# Patient Record
Sex: MALE | Race: WHITE | Employment: FULL TIME | URBAN - METROPOLITAN AREA
[De-identification: names, ages, dates, MRNs, and addresses within clinical notes are randomized per-mention and may not be internally consistent; named-entity substitution may affect disease eponyms.]

---

## 2018-03-20 ENCOUNTER — HOSPITAL ENCOUNTER (OUTPATIENT)
Age: 43
Discharge: HOME | End: 2018-03-22
Payer: COMMERCIAL

## 2018-03-20 ENCOUNTER — OFFICE VISIT (OUTPATIENT)
Dept: FAMILY MEDICINE CLINIC | Age: 43
End: 2018-03-20
Payer: COMMERCIAL

## 2018-03-20 VITALS
TEMPERATURE: 98.3 F | DIASTOLIC BLOOD PRESSURE: 99 MMHG | HEIGHT: 70 IN | RESPIRATION RATE: 18 BRPM | HEART RATE: 96 BPM | SYSTOLIC BLOOD PRESSURE: 140 MMHG | OXYGEN SATURATION: 94 % | BODY MASS INDEX: 42.86 KG/M2 | WEIGHT: 299.4 LBS

## 2018-03-20 DIAGNOSIS — E79.0 ELEVATED URIC ACID IN BLOOD: ICD-10-CM

## 2018-03-20 DIAGNOSIS — I10 ESSENTIAL HYPERTENSION: ICD-10-CM

## 2018-03-20 DIAGNOSIS — N20.0 KIDNEY STONE: ICD-10-CM

## 2018-03-20 DIAGNOSIS — E78.5 HYPERLIPIDEMIA, UNSPECIFIED HYPERLIPIDEMIA TYPE: ICD-10-CM

## 2018-03-20 DIAGNOSIS — Z00.00 ANNUAL PHYSICAL EXAM: Primary | ICD-10-CM

## 2018-03-20 DIAGNOSIS — E66.01 MORBID OBESITY DUE TO EXCESS CALORIES (HCC): ICD-10-CM

## 2018-03-20 LAB
BASOPHILS ABSOLUTE: 0.07 E9/L (ref 0–0.2)
BASOPHILS RELATIVE PERCENT: 0.7 % (ref 0–2)
EOSINOPHILS ABSOLUTE: 0.27 E9/L (ref 0.05–0.5)
EOSINOPHILS RELATIVE PERCENT: 2.7 % (ref 0–6)
HCT VFR BLD CALC: 51.9 % (ref 37–54)
HEMOGLOBIN: 16 G/DL (ref 12.5–16.5)
IMMATURE GRANULOCYTES #: 0.05 E9/L
IMMATURE GRANULOCYTES %: 0.5 % (ref 0–5)
LYMPHOCYTES ABSOLUTE: 2.96 E9/L (ref 1.5–4)
LYMPHOCYTES RELATIVE PERCENT: 29.1 % (ref 20–42)
MCH RBC QN AUTO: 26.4 PG (ref 26–35)
MCHC RBC AUTO-ENTMCNC: 30.8 % (ref 32–34.5)
MCV RBC AUTO: 85.6 FL (ref 80–99.9)
MONOCYTES ABSOLUTE: 0.9 E9/L (ref 0.1–0.95)
MONOCYTES RELATIVE PERCENT: 8.8 % (ref 2–12)
NEUTROPHILS ABSOLUTE: 5.92 E9/L (ref 1.8–7.3)
NEUTROPHILS RELATIVE PERCENT: 58.2 % (ref 43–80)
PDW BLD-RTO: 15.7 FL (ref 11.5–15)
PLATELET # BLD: 381 E9/L (ref 130–450)
PMV BLD AUTO: 11.1 FL (ref 7–12)
RBC # BLD: 6.06 E12/L (ref 3.8–5.8)
WBC # BLD: 10.2 E9/L (ref 4.5–11.5)

## 2018-03-20 PROCEDURE — 99396 PREV VISIT EST AGE 40-64: CPT | Performed by: FAMILY MEDICINE

## 2018-03-20 PROCEDURE — 80053 COMPREHEN METABOLIC PANEL: CPT

## 2018-03-20 PROCEDURE — 84550 ASSAY OF BLOOD/URIC ACID: CPT

## 2018-03-20 PROCEDURE — 80061 LIPID PANEL: CPT

## 2018-03-20 PROCEDURE — 93000 ELECTROCARDIOGRAM COMPLETE: CPT | Performed by: FAMILY MEDICINE

## 2018-03-20 PROCEDURE — 85025 COMPLETE CBC W/AUTO DIFF WBC: CPT

## 2018-03-20 PROCEDURE — 36415 COLL VENOUS BLD VENIPUNCTURE: CPT | Performed by: FAMILY MEDICINE

## 2018-03-20 PROCEDURE — 83036 HEMOGLOBIN GLYCOSYLATED A1C: CPT

## 2018-03-20 RX ORDER — ALLOPURINOL 300 MG/1
TABLET ORAL
Qty: 90 TABLET | Refills: 3 | Status: SHIPPED | OUTPATIENT
Start: 2018-03-20 | End: 2019-03-13 | Stop reason: SDUPTHER

## 2018-03-20 RX ORDER — OXYCODONE AND ACETAMINOPHEN 10; 325 MG/1; MG/1
1 TABLET ORAL EVERY 6 HOURS PRN
Qty: 50 TABLET | Refills: 0 | Status: SHIPPED | OUTPATIENT
Start: 2018-03-20 | End: 2018-05-16 | Stop reason: SDUPTHER

## 2018-03-20 ASSESSMENT — PATIENT HEALTH QUESTIONNAIRE - PHQ9
1. LITTLE INTEREST OR PLEASURE IN DOING THINGS: 0
SUM OF ALL RESPONSES TO PHQ9 QUESTIONS 1 & 2: 0
2. FEELING DOWN, DEPRESSED OR HOPELESS: 0
SUM OF ALL RESPONSES TO PHQ QUESTIONS 1-9: 0

## 2018-03-20 NOTE — PROGRESS NOTES
Annual exam:  Patient is here for routine yearly physical/preventative visit. Patient has no complaints or concerns today. Patient is not generally healthy. Chronic medical conditions are generally controlled. Most recent labs reviewed with patient and  are remarkable. Health maintenance reviewed with patient and is not up to date. Overall doing well. DM2:   Patient is here to fu regarding DM2. Patient is not controlled. Taking all medications and tolerating well. Fasting sugars are running 200. Patient is taking ASA and Ace Inhibitor/ARB. Patient is  on appropriately-dosed statin. LDL is  at goal.  BP is not controlled. No hypoglycemic episodes. Patient does not see Podiatry regularly. Saw an Eye Dr Nate Conte. Patient is aware that it is necessary to see an Eye Dr yearly. Patient does not smoke. Most recent labs reviewed with patient. Patient does not have complaints or concerns today. Past Medical History:   Diagnosis Date    Hyperlipidemia     Hypertension     Kidney stones     Multiple times    Type II or unspecified type diabetes mellitus without mention of complication, not stated as uncontrolled       Past Surgical History:   Procedure Laterality Date    FRACTURE SURGERY  1992    Lt leg & ankle fx    TONSILLECTOMY  2000      Family History   Problem Relation Age of Onset    Diabetes Mother     High Blood Pressure Mother     Cancer Father      prostate    Heart Disease Father      MI @ 52yrs old/ also 1 stent     Social History     Social History    Marital status:      Spouse name: N/A    Number of children: N/A    Years of education: N/A     Occupational History    Not on file.      Social History Main Topics    Smoking status: Never Smoker    Smokeless tobacco: Never Used    Alcohol use No    Drug use: No    Sexual activity: Not on file     Other Topics Concern    Not on file     Social History Narrative    No narrative on file      Allergies   Allergen reflexes. Skin: unremarkable    Assessment/Plan:  Abbe Norton was seen today for annual exam.    Diagnoses and all orders for this visit:    Annual physical exam    Uncontrolled type 2 diabetes mellitus without complication, with long-term current use of insulin ([facility]  -     Comprehensive Metabolic Panel; Future  -     CBC Auto Differential; Future  -     Hemoglobin A1C; Future  -     Lipid Panel; Future    Essential hypertension  -     EKG 12 Lead    Hyperlipidemia, unspecified hyperlipidemia type    Morbid obesity due to excess calories (HCC)    Elevated uric acid in blood  -     Uric Acid; Future    Kidney stone  -     oxyCODONE-acetaminophen (PERCOCET)  MG per tablet; Take 1 tablet by mouth every 6 hours as needed for Pain for up to 7 days. Other orders  -     allopurinol (ZYLOPRIM) 300 MG tablet; TAKE 1 TABLET BY MOUTH ONCE DAILY  -     sitaGLIPtan-metformin (JANUMET)  MG per tablet; TAKE 1 TAB(S) 2 TIMES A DAY ORALLY      As above. Call or go to ED immediately if symptoms worsen or persist.  No Follow-up on file. or sooner if necessary. Educational materials and/or home exercises printed for patient's review and were included in patient instructions on his/her After Visit Summary and given to patient at the end of visit. Counseled regarding above diagnosis, including possible risks and complications,  especially if left uncontrolled. Counseled regarding the possible side effects, risks, benefits and alternatives to treatment; patient and/or guardian verbalizes understanding, agrees, feels comfortable with and wishes to proceed with above treatment plan. Advised patient to call with any new medication issues, and read all Rx info from pharmacy to assure aware of all possible risks and side effects of medication before taking. Reviewed age and gender appropriate health screening exams and vaccinations.   Advised patient regarding importance of keeping up with recommended health maintenance and to schedule as soon as possible if overdue, as this is important in assessing for undiagnosed pathology, especially cancer, as well as protecting against potentially harmful/life threatening disease. Patient and/or guardian verbalizes understanding and agrees with above counseling, assessment and plan. All questions answered. Patient and/or guardian verbalizes understanding and agrees with above counseling, assessment and plan. All questions answered.

## 2018-03-21 LAB
ALBUMIN SERPL-MCNC: 4.4 G/DL (ref 3.5–5.2)
ALP BLD-CCNC: 65 U/L (ref 40–129)
ALT SERPL-CCNC: 29 U/L (ref 0–40)
ANION GAP SERPL CALCULATED.3IONS-SCNC: 17 MMOL/L (ref 7–16)
AST SERPL-CCNC: 24 U/L (ref 0–39)
BILIRUB SERPL-MCNC: 0.4 MG/DL (ref 0–1.2)
BUN BLDV-MCNC: 9 MG/DL (ref 6–20)
CALCIUM SERPL-MCNC: 9.4 MG/DL (ref 8.6–10.2)
CHLORIDE BLD-SCNC: 99 MMOL/L (ref 98–107)
CHOLESTEROL, TOTAL: 151 MG/DL (ref 0–199)
CO2: 22 MMOL/L (ref 22–29)
CREAT SERPL-MCNC: 0.5 MG/DL (ref 0.7–1.2)
GFR AFRICAN AMERICAN: >60
GFR NON-AFRICAN AMERICAN: >60 ML/MIN/1.73
GLUCOSE BLD-MCNC: 162 MG/DL (ref 74–109)
HBA1C MFR BLD: 9.1 % (ref 4.8–5.9)
HDLC SERPL-MCNC: 39 MG/DL
LDL CHOLESTEROL CALCULATED: 76 MG/DL (ref 0–99)
POTASSIUM SERPL-SCNC: 4.5 MMOL/L (ref 3.5–5)
SODIUM BLD-SCNC: 138 MMOL/L (ref 132–146)
TOTAL PROTEIN: 7.7 G/DL (ref 6.4–8.3)
TRIGL SERPL-MCNC: 179 MG/DL (ref 0–149)
URIC ACID, SERUM: 5.6 MG/DL (ref 3.4–7)
VLDLC SERPL CALC-MCNC: 36 MG/DL

## 2018-04-23 RX ORDER — LISINOPRIL 20 MG/1
TABLET ORAL
Qty: 90 TABLET | Refills: 3 | Status: SHIPPED | OUTPATIENT
Start: 2018-04-23 | End: 2019-04-09 | Stop reason: SDUPTHER

## 2018-05-16 ENCOUNTER — TELEPHONE (OUTPATIENT)
Dept: FAMILY MEDICINE CLINIC | Age: 43
End: 2018-05-16

## 2018-05-16 DIAGNOSIS — N20.0 KIDNEY STONE: ICD-10-CM

## 2018-05-16 RX ORDER — BACLOFEN 10 MG/1
TABLET ORAL
Qty: 90 TABLET | Refills: 1 | Status: SHIPPED | OUTPATIENT
Start: 2018-05-16 | End: 2018-07-24 | Stop reason: SDUPTHER

## 2018-05-16 RX ORDER — OXYCODONE AND ACETAMINOPHEN 10; 325 MG/1; MG/1
1 TABLET ORAL EVERY 6 HOURS PRN
Qty: 50 TABLET | Refills: 0 | Status: SHIPPED | OUTPATIENT
Start: 2018-05-16 | End: 2018-10-10 | Stop reason: SDUPTHER

## 2018-07-09 ENCOUNTER — HOSPITAL ENCOUNTER (OUTPATIENT)
Age: 43
Discharge: HOME | End: 2018-07-11
Payer: COMMERCIAL

## 2018-07-09 ENCOUNTER — OFFICE VISIT (OUTPATIENT)
Dept: FAMILY MEDICINE CLINIC | Age: 43
End: 2018-07-09
Payer: COMMERCIAL

## 2018-07-09 VITALS
WEIGHT: 302.4 LBS | OXYGEN SATURATION: 95 % | SYSTOLIC BLOOD PRESSURE: 122 MMHG | TEMPERATURE: 98.5 F | HEART RATE: 80 BPM | HEIGHT: 70 IN | DIASTOLIC BLOOD PRESSURE: 83 MMHG | BODY MASS INDEX: 43.29 KG/M2 | RESPIRATION RATE: 18 BRPM

## 2018-07-09 DIAGNOSIS — E78.5 HYPERLIPIDEMIA, UNSPECIFIED HYPERLIPIDEMIA TYPE: ICD-10-CM

## 2018-07-09 DIAGNOSIS — I10 ESSENTIAL HYPERTENSION: ICD-10-CM

## 2018-07-09 DIAGNOSIS — E66.01 MORBID OBESITY DUE TO EXCESS CALORIES (HCC): ICD-10-CM

## 2018-07-09 LAB
ALBUMIN SERPL-MCNC: 4.3 G/DL (ref 3.5–5.2)
ALP BLD-CCNC: 65 U/L (ref 40–129)
ALT SERPL-CCNC: 35 U/L (ref 0–40)
ANION GAP SERPL CALCULATED.3IONS-SCNC: 13 MMOL/L (ref 7–16)
AST SERPL-CCNC: 22 U/L (ref 0–39)
BASOPHILS ABSOLUTE: 0.06 E9/L (ref 0–0.2)
BASOPHILS RELATIVE PERCENT: 0.6 % (ref 0–2)
BILIRUB SERPL-MCNC: 0.4 MG/DL (ref 0–1.2)
BUN BLDV-MCNC: 10 MG/DL (ref 6–20)
CALCIUM SERPL-MCNC: 9.6 MG/DL (ref 8.6–10.2)
CHLORIDE BLD-SCNC: 99 MMOL/L (ref 98–107)
CHOLESTEROL, TOTAL: 158 MG/DL (ref 0–199)
CO2: 26 MMOL/L (ref 22–29)
CREAT SERPL-MCNC: 0.7 MG/DL (ref 0.7–1.2)
EOSINOPHILS ABSOLUTE: 0.27 E9/L (ref 0.05–0.5)
EOSINOPHILS RELATIVE PERCENT: 2.8 % (ref 0–6)
GFR AFRICAN AMERICAN: >60
GFR NON-AFRICAN AMERICAN: >60 ML/MIN/1.73
GLUCOSE BLD-MCNC: 75 MG/DL (ref 74–109)
HBA1C MFR BLD: 8.7 % (ref 4–5.6)
HCT VFR BLD CALC: 51.7 % (ref 37–54)
HDLC SERPL-MCNC: 40 MG/DL
HEMOGLOBIN: 15.3 G/DL (ref 12.5–16.5)
IMMATURE GRANULOCYTES #: 0.02 E9/L
IMMATURE GRANULOCYTES %: 0.2 % (ref 0–5)
LDL CHOLESTEROL CALCULATED: 71 MG/DL (ref 0–99)
LYMPHOCYTES ABSOLUTE: 2.99 E9/L (ref 1.5–4)
LYMPHOCYTES RELATIVE PERCENT: 31 % (ref 20–42)
MCH RBC QN AUTO: 26.1 PG (ref 26–35)
MCHC RBC AUTO-ENTMCNC: 29.6 % (ref 32–34.5)
MCV RBC AUTO: 88.1 FL (ref 80–99.9)
MONOCYTES ABSOLUTE: 0.89 E9/L (ref 0.1–0.95)
MONOCYTES RELATIVE PERCENT: 9.2 % (ref 2–12)
NEUTROPHILS ABSOLUTE: 5.4 E9/L (ref 1.8–7.3)
NEUTROPHILS RELATIVE PERCENT: 56.2 % (ref 43–80)
PDW BLD-RTO: 15.1 FL (ref 11.5–15)
PLATELET # BLD: 396 E9/L (ref 130–450)
PMV BLD AUTO: 10.8 FL (ref 7–12)
POTASSIUM SERPL-SCNC: 4.6 MMOL/L (ref 3.5–5)
RBC # BLD: 5.87 E12/L (ref 3.8–5.8)
SODIUM BLD-SCNC: 138 MMOL/L (ref 132–146)
TOTAL PROTEIN: 7.7 G/DL (ref 6.4–8.3)
TRIGL SERPL-MCNC: 237 MG/DL (ref 0–149)
VLDLC SERPL CALC-MCNC: 47 MG/DL
WBC # BLD: 9.6 E9/L (ref 4.5–11.5)

## 2018-07-09 PROCEDURE — G8427 DOCREV CUR MEDS BY ELIG CLIN: HCPCS | Performed by: FAMILY MEDICINE

## 2018-07-09 PROCEDURE — 80061 LIPID PANEL: CPT

## 2018-07-09 PROCEDURE — 1036F TOBACCO NON-USER: CPT | Performed by: FAMILY MEDICINE

## 2018-07-09 PROCEDURE — 83036 HEMOGLOBIN GLYCOSYLATED A1C: CPT

## 2018-07-09 PROCEDURE — 80053 COMPREHEN METABOLIC PANEL: CPT

## 2018-07-09 PROCEDURE — 2022F DILAT RTA XM EVC RTNOPTHY: CPT | Performed by: FAMILY MEDICINE

## 2018-07-09 PROCEDURE — 3046F HEMOGLOBIN A1C LEVEL >9.0%: CPT | Performed by: FAMILY MEDICINE

## 2018-07-09 PROCEDURE — 85025 COMPLETE CBC W/AUTO DIFF WBC: CPT

## 2018-07-09 PROCEDURE — 36415 COLL VENOUS BLD VENIPUNCTURE: CPT | Performed by: FAMILY MEDICINE

## 2018-07-09 PROCEDURE — G8417 CALC BMI ABV UP PARAM F/U: HCPCS | Performed by: FAMILY MEDICINE

## 2018-07-09 PROCEDURE — 99214 OFFICE O/P EST MOD 30 MIN: CPT | Performed by: FAMILY MEDICINE

## 2018-07-09 NOTE — PROGRESS NOTES
DM2:   Patient is here to fu regarding DM2. Patient is not controlled. Taking all medications and tolerating well. Fasting sugars are running 200. Patient is taking ASA and Ace Inhibitor/ARB. Patient is  on appropriately-dosed statin. LDL is  at goal.  BP is  controlled. No hypoglycemic episodes. Patient does not see Podiatry regularly. Saw an Eye Dr 2014. Patient is aware that it is necessary to see an Eye Dr yearly. Patient does not smoke. Most recent labs reviewed with patient. Patient does not have complaints or concerns today. Lab Results   Component Value Date    LABA1C 9.1 (H) 03/20/2018       Lab Results   Component Value Date    LDLCALC 76 03/20/2018        Patient's past medical, surgical, social and/or family history reviewed, updated in chart, and are non-contributory (unless otherwise stated). Medications and allergies also reviewed and updated in chart.       Review of Systems:  Constitutional:  No fever, no fatigue, no chills, no headaches, no weight change  Dermatology:  No rash, no mole, no dry or sensitive skin  ENT:  No cough, no sore throat, no sinus pain, no runny nose, no ear pain  Cardiology:  No chest pain, no palpitations, no leg edema, no shortness of breath, no PND  Gastroenterology:  No dysphagia, no abdominal pain, no nausea, no vomiting, no constipation, no diarrhea, no heartburn  Musculoskeletal:  No joint pain, no leg cramps, no back pain, no muscle aches  Respiratory:  No shortness of breath, no orthopnea, no wheezing, no GARCIA, no hemoptysis  Urology:  No blood in the urine, no urinary frequency, no urinary incontinence, no urinary urgency, no nocturia, no dysuria    Vitals:    07/09/18 0829   BP: 122/83   Pulse: 80   Resp: 18   Temp: 98.5 °F (36.9 °C)   TempSrc: Oral   SpO2: 95%   Weight: (!) 302 lb 6.4 oz (137.2 kg)   Height: 5' 10\" (1.778 m)       General:  Patient alert and oriented x 3, NAD, pleasant  HEENT:  Atraumatic, normocephalic, PERRLA, EOMI, clear conjunctiva, TMs clear, nose-clear, throat - no erythema  Neck:  Supple, no goiter, no carotid bruits, no LAD  Lungs:  CTA B  Heart:  RRR, no murmurs, gallops or rubs  Abdomen:  Soft/nt/nd, + bowel sounds  Extremities:  No clubbing, cyanosis or edema  Skin: unremarkable    Assessment/Plan:      Jesenia Varghese was seen today for diabetes. Diagnoses and all orders for this visit:    Uncontrolled type 2 diabetes mellitus without complication, with long-term current use of insulin ([address]  -     Comprehensive Metabolic Panel; Future  -     Hemoglobin A1C; Future  -     CBC Auto Differential; Future  -     Lipid Panel; Future    Essential hypertension    Hyperlipidemia, unspecified hyperlipidemia type    Morbid obesity due to excess calories ([address]    Other orders  -     insulin detemir (LEVEMIR FLEXTOUCH) 100 UNIT/ML injection pen; Inject 80 Units into the skin 2 times daily      As above. Call or go to ED immediately if symptoms worsen or persist.  Return in about 3 months (around 10/9/2018). , or sooner if necessary. Educational materials and/or home exercises printed for patient's review and were included in patient instructions on his/her After Visit Summary and given to patient at the end of visit. Counseled regarding above diagnosis, including possible risks and complications,  especially if left uncontrolled. Counseled regarding the possible side effects, risks, benefits and alternatives to treatment; patient and/or guardian verbalizes understanding, agrees, feels comfortable with and wishes to proceed with above treatment plan. Advised patient to call with any new medication issues, and read all Rx info from pharmacy to assure aware of all possible risks and side effects of medication before taking. Reviewed age and gender appropriate health screening exams and vaccinations.   Advised patient regarding importance of keeping up with recommended health maintenance and to schedule as soon as possible if

## 2018-07-24 RX ORDER — BACLOFEN 10 MG/1
TABLET ORAL
Qty: 90 TABLET | Refills: 1 | Status: SHIPPED | OUTPATIENT
Start: 2018-07-24 | End: 2018-10-03 | Stop reason: SDUPTHER

## 2018-10-03 RX ORDER — BACLOFEN 10 MG/1
TABLET ORAL
Qty: 90 TABLET | Refills: 1 | Status: SHIPPED
Start: 2018-10-03 | End: 2020-02-19

## 2018-10-10 ENCOUNTER — OFFICE VISIT (OUTPATIENT)
Dept: FAMILY MEDICINE CLINIC | Age: 43
End: 2018-10-10
Payer: COMMERCIAL

## 2018-10-10 ENCOUNTER — HOSPITAL ENCOUNTER (OUTPATIENT)
Age: 43
Discharge: HOME | End: 2018-10-12
Payer: COMMERCIAL

## 2018-10-10 VITALS
BODY MASS INDEX: 43.46 KG/M2 | HEIGHT: 70 IN | WEIGHT: 303.6 LBS | HEART RATE: 88 BPM | TEMPERATURE: 98.4 F | SYSTOLIC BLOOD PRESSURE: 120 MMHG | DIASTOLIC BLOOD PRESSURE: 81 MMHG | OXYGEN SATURATION: 95 % | RESPIRATION RATE: 18 BRPM

## 2018-10-10 DIAGNOSIS — E66.01 MORBID OBESITY DUE TO EXCESS CALORIES (HCC): ICD-10-CM

## 2018-10-10 DIAGNOSIS — Z80.42 FAMILY HISTORY OF PROSTATE CANCER IN FATHER: ICD-10-CM

## 2018-10-10 DIAGNOSIS — E11.65 UNCONTROLLED TYPE 2 DIABETES MELLITUS WITH HYPERGLYCEMIA (HCC): ICD-10-CM

## 2018-10-10 DIAGNOSIS — E78.5 HYPERLIPIDEMIA, UNSPECIFIED HYPERLIPIDEMIA TYPE: ICD-10-CM

## 2018-10-10 DIAGNOSIS — E11.65 UNCONTROLLED TYPE 2 DIABETES MELLITUS WITH HYPERGLYCEMIA (HCC): Primary | ICD-10-CM

## 2018-10-10 DIAGNOSIS — N20.0 KIDNEY STONE: ICD-10-CM

## 2018-10-10 DIAGNOSIS — I10 ESSENTIAL HYPERTENSION: ICD-10-CM

## 2018-10-10 LAB
ALBUMIN SERPL-MCNC: 4.4 G/DL (ref 3.5–5.2)
ALP BLD-CCNC: 64 U/L (ref 40–129)
ALT SERPL-CCNC: 33 U/L (ref 0–40)
ANION GAP SERPL CALCULATED.3IONS-SCNC: 21 MMOL/L (ref 7–16)
AST SERPL-CCNC: 28 U/L (ref 0–39)
BILIRUB SERPL-MCNC: 0.4 MG/DL (ref 0–1.2)
BUN BLDV-MCNC: 13 MG/DL (ref 6–20)
CALCIUM SERPL-MCNC: 9.6 MG/DL (ref 8.6–10.2)
CHLORIDE BLD-SCNC: 101 MMOL/L (ref 98–107)
CHOLESTEROL, TOTAL: 140 MG/DL (ref 0–199)
CO2: 18 MMOL/L (ref 22–29)
CREAT SERPL-MCNC: 0.6 MG/DL (ref 0.7–1.2)
CREATININE URINE POCT: 100
GFR AFRICAN AMERICAN: >60
GFR NON-AFRICAN AMERICAN: >60 ML/MIN/1.73
GLUCOSE BLD-MCNC: 86 MG/DL (ref 74–109)
HBA1C MFR BLD: 9 % (ref 4–5.6)
HDLC SERPL-MCNC: 34 MG/DL
LDL CHOLESTEROL CALCULATED: 62 MG/DL (ref 0–99)
MICROALBUMIN/CREAT 24H UR: 10 MG/G{CREAT}
MICROALBUMIN/CREAT UR-RTO: <30
POTASSIUM SERPL-SCNC: 4.6 MMOL/L (ref 3.5–5)
PROSTATE SPECIFIC ANTIGEN: 1.72 NG/ML (ref 0–4)
SODIUM BLD-SCNC: 140 MMOL/L (ref 132–146)
TOTAL PROTEIN: 7.6 G/DL (ref 6.4–8.3)
TRIGL SERPL-MCNC: 219 MG/DL (ref 0–149)
VLDLC SERPL CALC-MCNC: 44 MG/DL

## 2018-10-10 PROCEDURE — 2022F DILAT RTA XM EVC RTNOPTHY: CPT | Performed by: FAMILY MEDICINE

## 2018-10-10 PROCEDURE — 80053 COMPREHEN METABOLIC PANEL: CPT

## 2018-10-10 PROCEDURE — 1036F TOBACCO NON-USER: CPT | Performed by: FAMILY MEDICINE

## 2018-10-10 PROCEDURE — 3045F PR MOST RECENT HEMOGLOBIN A1C LEVEL 7.0-9.0%: CPT | Performed by: FAMILY MEDICINE

## 2018-10-10 PROCEDURE — 82044 UR ALBUMIN SEMIQUANTITATIVE: CPT | Performed by: FAMILY MEDICINE

## 2018-10-10 PROCEDURE — G8417 CALC BMI ABV UP PARAM F/U: HCPCS | Performed by: FAMILY MEDICINE

## 2018-10-10 PROCEDURE — G0103 PSA SCREENING: HCPCS

## 2018-10-10 PROCEDURE — G8484 FLU IMMUNIZE NO ADMIN: HCPCS | Performed by: FAMILY MEDICINE

## 2018-10-10 PROCEDURE — 99214 OFFICE O/P EST MOD 30 MIN: CPT | Performed by: FAMILY MEDICINE

## 2018-10-10 PROCEDURE — 83036 HEMOGLOBIN GLYCOSYLATED A1C: CPT

## 2018-10-10 PROCEDURE — 80061 LIPID PANEL: CPT

## 2018-10-10 PROCEDURE — G8427 DOCREV CUR MEDS BY ELIG CLIN: HCPCS | Performed by: FAMILY MEDICINE

## 2018-10-10 RX ORDER — OXYCODONE AND ACETAMINOPHEN 10; 325 MG/1; MG/1
1 TABLET ORAL EVERY 6 HOURS PRN
Qty: 42 TABLET | Refills: 0 | Status: SHIPPED | OUTPATIENT
Start: 2018-10-10 | End: 2018-10-17

## 2018-11-02 DIAGNOSIS — N20.0 KIDNEY STONE: ICD-10-CM

## 2018-11-02 RX ORDER — OXYCODONE AND ACETAMINOPHEN 10; 325 MG/1; MG/1
1 TABLET ORAL EVERY 6 HOURS PRN
Qty: 50 TABLET | Refills: 0 | Status: SHIPPED | OUTPATIENT
Start: 2018-11-02 | End: 2019-01-23 | Stop reason: SDUPTHER

## 2019-01-23 ENCOUNTER — HOSPITAL ENCOUNTER (OUTPATIENT)
Age: 44
Discharge: HOME | End: 2019-01-25
Payer: COMMERCIAL

## 2019-01-23 ENCOUNTER — OFFICE VISIT (OUTPATIENT)
Dept: FAMILY MEDICINE CLINIC | Age: 44
End: 2019-01-23
Payer: COMMERCIAL

## 2019-01-23 VITALS
BODY MASS INDEX: 42.09 KG/M2 | HEART RATE: 94 BPM | HEIGHT: 70 IN | TEMPERATURE: 98.5 F | RESPIRATION RATE: 16 BRPM | DIASTOLIC BLOOD PRESSURE: 83 MMHG | SYSTOLIC BLOOD PRESSURE: 116 MMHG | OXYGEN SATURATION: 95 % | WEIGHT: 294 LBS

## 2019-01-23 DIAGNOSIS — E11.65 UNCONTROLLED TYPE 2 DIABETES MELLITUS WITH HYPERGLYCEMIA (HCC): ICD-10-CM

## 2019-01-23 DIAGNOSIS — I10 ESSENTIAL HYPERTENSION: ICD-10-CM

## 2019-01-23 DIAGNOSIS — E11.65 UNCONTROLLED TYPE 2 DIABETES MELLITUS WITH HYPERGLYCEMIA (HCC): Primary | ICD-10-CM

## 2019-01-23 DIAGNOSIS — E78.5 HYPERLIPIDEMIA, UNSPECIFIED HYPERLIPIDEMIA TYPE: ICD-10-CM

## 2019-01-23 DIAGNOSIS — E66.01 MORBID OBESITY DUE TO EXCESS CALORIES (HCC): ICD-10-CM

## 2019-01-23 DIAGNOSIS — N20.0 KIDNEY STONE: ICD-10-CM

## 2019-01-23 LAB
ALBUMIN SERPL-MCNC: 4.7 G/DL (ref 3.5–5.2)
ALP BLD-CCNC: 69 U/L (ref 40–129)
ALT SERPL-CCNC: 24 U/L (ref 0–40)
ANION GAP SERPL CALCULATED.3IONS-SCNC: 19 MMOL/L (ref 7–16)
AST SERPL-CCNC: 23 U/L (ref 0–39)
BASOPHILS ABSOLUTE: 0.06 E9/L (ref 0–0.2)
BASOPHILS RELATIVE PERCENT: 0.5 % (ref 0–2)
BILIRUB SERPL-MCNC: 0.4 MG/DL (ref 0–1.2)
BUN BLDV-MCNC: 13 MG/DL (ref 6–20)
CALCIUM SERPL-MCNC: 9.5 MG/DL (ref 8.6–10.2)
CHLORIDE BLD-SCNC: 100 MMOL/L (ref 98–107)
CHOLESTEROL, TOTAL: 139 MG/DL (ref 0–199)
CO2: 22 MMOL/L (ref 22–29)
CREAT SERPL-MCNC: 0.6 MG/DL (ref 0.7–1.2)
EOSINOPHILS ABSOLUTE: 0.39 E9/L (ref 0.05–0.5)
EOSINOPHILS RELATIVE PERCENT: 3.6 % (ref 0–6)
GFR AFRICAN AMERICAN: >60
GFR NON-AFRICAN AMERICAN: >60 ML/MIN/1.73
GLUCOSE BLD-MCNC: 95 MG/DL (ref 74–99)
HBA1C MFR BLD: 8.6 % (ref 4–5.6)
HCT VFR BLD CALC: 53.4 % (ref 37–54)
HDLC SERPL-MCNC: 34 MG/DL
HEMOGLOBIN: 15.7 G/DL (ref 12.5–16.5)
IMMATURE GRANULOCYTES #: 0.03 E9/L
IMMATURE GRANULOCYTES %: 0.3 % (ref 0–5)
LDL CHOLESTEROL CALCULATED: 56 MG/DL (ref 0–99)
LYMPHOCYTES ABSOLUTE: 3.33 E9/L (ref 1.5–4)
LYMPHOCYTES RELATIVE PERCENT: 30.4 % (ref 20–42)
MCH RBC QN AUTO: 25.8 PG (ref 26–35)
MCHC RBC AUTO-ENTMCNC: 29.4 % (ref 32–34.5)
MCV RBC AUTO: 87.8 FL (ref 80–99.9)
MONOCYTES ABSOLUTE: 0.97 E9/L (ref 0.1–0.95)
MONOCYTES RELATIVE PERCENT: 8.9 % (ref 2–12)
NEUTROPHILS ABSOLUTE: 6.16 E9/L (ref 1.8–7.3)
NEUTROPHILS RELATIVE PERCENT: 56.3 % (ref 43–80)
PDW BLD-RTO: 16.9 FL (ref 11.5–15)
PLATELET # BLD: 411 E9/L (ref 130–450)
PMV BLD AUTO: 10.1 FL (ref 7–12)
POTASSIUM SERPL-SCNC: 4.7 MMOL/L (ref 3.5–5)
RBC # BLD: 6.08 E12/L (ref 3.8–5.8)
SODIUM BLD-SCNC: 141 MMOL/L (ref 132–146)
TOTAL PROTEIN: 8 G/DL (ref 6.4–8.3)
TRIGL SERPL-MCNC: 244 MG/DL (ref 0–149)
VLDLC SERPL CALC-MCNC: 49 MG/DL
WBC # BLD: 10.9 E9/L (ref 4.5–11.5)

## 2019-01-23 PROCEDURE — 80061 LIPID PANEL: CPT

## 2019-01-23 PROCEDURE — 3046F HEMOGLOBIN A1C LEVEL >9.0%: CPT | Performed by: FAMILY MEDICINE

## 2019-01-23 PROCEDURE — G8427 DOCREV CUR MEDS BY ELIG CLIN: HCPCS | Performed by: FAMILY MEDICINE

## 2019-01-23 PROCEDURE — 83036 HEMOGLOBIN GLYCOSYLATED A1C: CPT

## 2019-01-23 PROCEDURE — G8417 CALC BMI ABV UP PARAM F/U: HCPCS | Performed by: FAMILY MEDICINE

## 2019-01-23 PROCEDURE — 2022F DILAT RTA XM EVC RTNOPTHY: CPT | Performed by: FAMILY MEDICINE

## 2019-01-23 PROCEDURE — 85025 COMPLETE CBC W/AUTO DIFF WBC: CPT

## 2019-01-23 PROCEDURE — 99214 OFFICE O/P EST MOD 30 MIN: CPT | Performed by: FAMILY MEDICINE

## 2019-01-23 PROCEDURE — G8484 FLU IMMUNIZE NO ADMIN: HCPCS | Performed by: FAMILY MEDICINE

## 2019-01-23 PROCEDURE — 80053 COMPREHEN METABOLIC PANEL: CPT

## 2019-01-23 PROCEDURE — 1036F TOBACCO NON-USER: CPT | Performed by: FAMILY MEDICINE

## 2019-01-23 RX ORDER — OXYCODONE AND ACETAMINOPHEN 10; 325 MG/1; MG/1
1 TABLET ORAL EVERY 6 HOURS PRN
Qty: 42 TABLET | Refills: 0 | Status: SHIPPED | OUTPATIENT
Start: 2019-01-23 | End: 2019-04-24 | Stop reason: SDUPTHER

## 2019-03-13 RX ORDER — ALLOPURINOL 300 MG/1
TABLET ORAL
Qty: 90 TABLET | Refills: 3 | Status: SHIPPED
Start: 2019-03-13 | End: 2020-03-10

## 2019-03-13 RX ORDER — ROSUVASTATIN CALCIUM 10 MG/1
TABLET, COATED ORAL
Qty: 90 TABLET | Refills: 0 | Status: SHIPPED | OUTPATIENT
Start: 2019-03-13 | End: 2019-06-11 | Stop reason: SDUPTHER

## 2019-04-10 RX ORDER — LISINOPRIL 20 MG/1
TABLET ORAL
Qty: 90 TABLET | Refills: 3 | Status: SHIPPED
Start: 2019-04-10 | End: 2020-04-15

## 2019-04-15 ENCOUNTER — CARE COORDINATION (OUTPATIENT)
Dept: FAMILY MEDICINE CLINIC | Age: 44
End: 2019-04-15

## 2019-04-15 NOTE — CARE COORDINATION
Called and left message for Geneva Horn to call me, contact information given [phone]. Dona called me right back and is willing to meet briefly with me after his appointment on 4/24/19 He is aware that I am a diabetic coordinator here at the office.

## 2019-04-24 ENCOUNTER — OFFICE VISIT (OUTPATIENT)
Dept: FAMILY MEDICINE CLINIC | Age: 44
End: 2019-04-24
Payer: COMMERCIAL

## 2019-04-24 ENCOUNTER — HOSPITAL ENCOUNTER (OUTPATIENT)
Age: 44
Discharge: HOME | End: 2019-04-26
Payer: COMMERCIAL

## 2019-04-24 ENCOUNTER — CARE COORDINATION (OUTPATIENT)
Dept: FAMILY MEDICINE CLINIC | Age: 44
End: 2019-04-24

## 2019-04-24 VITALS
SYSTOLIC BLOOD PRESSURE: 120 MMHG | HEIGHT: 70 IN | BODY MASS INDEX: 42.66 KG/M2 | HEART RATE: 90 BPM | RESPIRATION RATE: 18 BRPM | OXYGEN SATURATION: 96 % | DIASTOLIC BLOOD PRESSURE: 81 MMHG | TEMPERATURE: 98.9 F | WEIGHT: 298 LBS

## 2019-04-24 DIAGNOSIS — I10 ESSENTIAL HYPERTENSION: ICD-10-CM

## 2019-04-24 DIAGNOSIS — E11.65 UNCONTROLLED TYPE 2 DIABETES MELLITUS WITH HYPERGLYCEMIA (HCC): Primary | ICD-10-CM

## 2019-04-24 DIAGNOSIS — N20.0 KIDNEY STONE: ICD-10-CM

## 2019-04-24 DIAGNOSIS — E78.5 HYPERLIPIDEMIA, UNSPECIFIED HYPERLIPIDEMIA TYPE: ICD-10-CM

## 2019-04-24 DIAGNOSIS — E11.65 UNCONTROLLED TYPE 2 DIABETES MELLITUS WITH HYPERGLYCEMIA (HCC): ICD-10-CM

## 2019-04-24 DIAGNOSIS — E66.01 MORBID OBESITY DUE TO EXCESS CALORIES (HCC): ICD-10-CM

## 2019-04-24 DIAGNOSIS — F41.8 DEPRESSION WITH ANXIETY: ICD-10-CM

## 2019-04-24 LAB
ALBUMIN SERPL-MCNC: 4.2 G/DL (ref 3.5–5.2)
ALP BLD-CCNC: 66 U/L (ref 40–129)
ALT SERPL-CCNC: 22 U/L (ref 0–40)
ANION GAP SERPL CALCULATED.3IONS-SCNC: 15 MMOL/L (ref 7–16)
AST SERPL-CCNC: 19 U/L (ref 0–39)
BASOPHILS ABSOLUTE: 0.05 E9/L (ref 0–0.2)
BASOPHILS RELATIVE PERCENT: 0.5 % (ref 0–2)
BILIRUB SERPL-MCNC: 0.4 MG/DL (ref 0–1.2)
BUN BLDV-MCNC: 9 MG/DL (ref 6–20)
CALCIUM SERPL-MCNC: 9 MG/DL (ref 8.6–10.2)
CHLORIDE BLD-SCNC: 101 MMOL/L (ref 98–107)
CHOLESTEROL, TOTAL: 149 MG/DL (ref 0–199)
CO2: 21 MMOL/L (ref 22–29)
CREAT SERPL-MCNC: 0.6 MG/DL (ref 0.7–1.2)
EOSINOPHILS ABSOLUTE: 0.28 E9/L (ref 0.05–0.5)
EOSINOPHILS RELATIVE PERCENT: 3 % (ref 0–6)
GFR AFRICAN AMERICAN: >60
GFR NON-AFRICAN AMERICAN: >60 ML/MIN/1.73
GLUCOSE BLD-MCNC: 186 MG/DL (ref 74–99)
HBA1C MFR BLD: 8.5 % (ref 4–5.6)
HCT VFR BLD CALC: 49.9 % (ref 37–54)
HDLC SERPL-MCNC: 34 MG/DL
HEMOGLOBIN: 14.9 G/DL (ref 12.5–16.5)
IMMATURE GRANULOCYTES #: 0.03 E9/L
IMMATURE GRANULOCYTES %: 0.3 % (ref 0–5)
LDL CHOLESTEROL CALCULATED: 80 MG/DL (ref 0–99)
LYMPHOCYTES ABSOLUTE: 2.73 E9/L (ref 1.5–4)
LYMPHOCYTES RELATIVE PERCENT: 29.5 % (ref 20–42)
MCH RBC QN AUTO: 26.1 PG (ref 26–35)
MCHC RBC AUTO-ENTMCNC: 29.9 % (ref 32–34.5)
MCV RBC AUTO: 87.4 FL (ref 80–99.9)
MONOCYTES ABSOLUTE: 1.15 E9/L (ref 0.1–0.95)
MONOCYTES RELATIVE PERCENT: 12.4 % (ref 2–12)
NEUTROPHILS ABSOLUTE: 5.01 E9/L (ref 1.8–7.3)
NEUTROPHILS RELATIVE PERCENT: 54.3 % (ref 43–80)
PDW BLD-RTO: 15.9 FL (ref 11.5–15)
PLATELET # BLD: 351 E9/L (ref 130–450)
PMV BLD AUTO: 10.3 FL (ref 7–12)
POTASSIUM SERPL-SCNC: 4.8 MMOL/L (ref 3.5–5)
RBC # BLD: 5.71 E12/L (ref 3.8–5.8)
SODIUM BLD-SCNC: 137 MMOL/L (ref 132–146)
TOTAL PROTEIN: 7.4 G/DL (ref 6.4–8.3)
TRIGL SERPL-MCNC: 177 MG/DL (ref 0–149)
VLDLC SERPL CALC-MCNC: 35 MG/DL
WBC # BLD: 9.3 E9/L (ref 4.5–11.5)

## 2019-04-24 PROCEDURE — G8427 DOCREV CUR MEDS BY ELIG CLIN: HCPCS | Performed by: FAMILY MEDICINE

## 2019-04-24 PROCEDURE — 99214 OFFICE O/P EST MOD 30 MIN: CPT | Performed by: FAMILY MEDICINE

## 2019-04-24 PROCEDURE — G8417 CALC BMI ABV UP PARAM F/U: HCPCS | Performed by: FAMILY MEDICINE

## 2019-04-24 PROCEDURE — 80053 COMPREHEN METABOLIC PANEL: CPT

## 2019-04-24 PROCEDURE — 83036 HEMOGLOBIN GLYCOSYLATED A1C: CPT

## 2019-04-24 PROCEDURE — 1036F TOBACCO NON-USER: CPT | Performed by: FAMILY MEDICINE

## 2019-04-24 PROCEDURE — 85025 COMPLETE CBC W/AUTO DIFF WBC: CPT

## 2019-04-24 PROCEDURE — 80061 LIPID PANEL: CPT

## 2019-04-24 PROCEDURE — 2022F DILAT RTA XM EVC RTNOPTHY: CPT | Performed by: FAMILY MEDICINE

## 2019-04-24 PROCEDURE — 3045F PR MOST RECENT HEMOGLOBIN A1C LEVEL 7.0-9.0%: CPT | Performed by: FAMILY MEDICINE

## 2019-04-24 PROCEDURE — 36415 COLL VENOUS BLD VENIPUNCTURE: CPT | Performed by: FAMILY MEDICINE

## 2019-04-24 RX ORDER — ESCITALOPRAM OXALATE 10 MG/1
10 TABLET ORAL DAILY
Qty: 30 TABLET | Refills: 3 | Status: SHIPPED | OUTPATIENT
Start: 2019-04-24 | End: 2019-08-25 | Stop reason: SDUPTHER

## 2019-04-24 RX ORDER — OXYCODONE AND ACETAMINOPHEN 10; 325 MG/1; MG/1
1 TABLET ORAL EVERY 6 HOURS PRN
Qty: 42 TABLET | Refills: 0 | Status: SHIPPED | OUTPATIENT
Start: 2019-04-24 | End: 2019-07-31 | Stop reason: SDUPTHER

## 2019-04-24 NOTE — PROGRESS NOTES
DM2:   Patient is here to fu regarding DM2. Patient is not controlled. Taking all medications and tolerating well. Fasting sugars are running 150-250. Patient is taking ASA and Ace Inhibitor/ARB. Patient is  on appropriately-dosed statin. LDL is  at goal.  BP is  controlled. No hypoglycemic episodes. Patient does not see Podiatry regularly. Saw an Eye Dr 2018. Patient is aware that it is necessary to see an Eye Dr yearly. Patient does not smoke. Most recent labs reviewed with patient. Patient does not have complaints or concerns today. He is overwhelmed with his father's death and his mother's failing health. Patient is fasting. Lab Results   Component Value Date    LABA1C 8.6 (H) 01/23/2019       Lab Results   Component Value Date    LDLCALC 56 01/23/2019        Patient's past medical, surgical, social and/or family history reviewed, updated in chart, and are non-contributory (unless otherwise stated). Medications and allergies also reviewed and updated in chart.       Review of Systems:  Constitutional:  No fever, no fatigue, no chills, no headaches, no weight change  Dermatology:  No rash, no mole, no dry or sensitive skin  ENT:  No cough, no sore throat, no sinus pain, no runny nose, no ear pain  Cardiology:  No chest pain, no palpitations, no leg edema, no shortness of breath, no PND  Gastroenterology:  No dysphagia, no abdominal pain, no nausea, no vomiting, no constipation, no diarrhea, no heartburn  Musculoskeletal:  No joint pain, no leg cramps, no back pain, no muscle aches  Respiratory:  No shortness of breath, no orthopnea, no wheezing, no GARCIA, no hemoptysis  Urology:  No blood in the urine, no urinary frequency, no urinary incontinence, no urinary urgency, no nocturia, no dysuria    Vitals:    04/24/19 0709   BP: 120/81   Pulse: 90   Resp: 18   Temp: 98.9 °F (37.2 °C)   TempSrc: Oral   SpO2: 96%   Weight: 298 lb (135.2 kg)   Height: 5' 10\" (1.778 m)       General:  Patient alert and oriented x 3, NAD, pleasant  HEENT:  Atraumatic, normocephalic, PERRLA, EOMI, clear conjunctiva, TMs clear, nose-clear, throat - no erythema  Neck:  Supple, no goiter, no carotid bruits, no LAD  Lungs:  CTA B  Heart:  RRR, no murmurs, gallops or rubs  Abdomen:  Soft/nt/nd, + bowel sounds  Extremities:  No clubbing, cyanosis or edema  Skin: unremarkable    Assessment/Plan:      Apurva Dickens was seen today for diabetes. Diagnoses and all orders for this visit:    Uncontrolled type 2 diabetes mellitus with hyperglycemia ([facility]  -     Comprehensive Metabolic Panel; Future  -     CBC Auto Differential; Future  -     Hemoglobin A1C; Future  -     Lipid Panel; Future    Kidney stone  -     oxyCODONE-acetaminophen (PERCOCET)  MG per tablet; Take 1 tablet by mouth every 6 hours as needed for Pain for up to 7 days. Essential hypertension    Hyperlipidemia, unspecified hyperlipidemia type    Morbid obesity due to excess calories ([facility]    Depression with anxiety    Other orders  -     escitalopram (LEXAPRO) 10 MG tablet; Take 1 tablet by mouth daily      As above. Call or go to ED immediately if symptoms worsen or persist.  No follow-ups on file. , or sooner if necessary. Educational materials and/or home exercises printed for patient's review and were included in patient instructions on his/her After Visit Summary and given to patient at the end of visit. Counseled regarding above diagnosis, including possible risks and complications,  especially if left uncontrolled. Counseled regarding the possible side effects, risks, benefits and alternatives to treatment; patient and/or guardian verbalizes understanding, agrees, feels comfortable with and wishes to proceed with above treatment plan. Advised patient to call with any new medication issues, and read all Rx info from pharmacy to assure aware of all possible risks and side effects of medication before taking.     Reviewed age and gender appropriate health screening exams and vaccinations. Advised patient regarding importance of keeping up with recommended health maintenance and to schedule as soon as possible if overdue, as this is important in assessing for undiagnosed pathology, especially cancer, as well as protecting against potentially harmful/life threatening disease. Patient and/or guardian verbalizes understanding and agrees with above counseling, assessment and plan. All questions answered.

## 2019-05-08 ENCOUNTER — TELEPHONE (OUTPATIENT)
Dept: FAMILY MEDICINE CLINIC | Age: 44
End: 2019-05-08

## 2019-05-08 RX ORDER — BACLOFEN 10 MG/1
TABLET ORAL
Qty: 90 TABLET | Refills: 1 | Status: SHIPPED
Start: 2019-05-08 | End: 2020-02-19

## 2019-05-08 NOTE — TELEPHONE ENCOUNTER
Patient left message stating that he pulled a muscle in his back on Monday night and he needs something for the pain. He said you had given him Baclofen and percocet in the past and asked if you can call in something for him. He stayed home from work the last couple of days. He said he hurt his back bending over at his mother's house. If you send anything in for him he asked that it be sent to [facility] in [place] since he is not at work.

## 2019-06-11 RX ORDER — ROSUVASTATIN CALCIUM 10 MG/1
TABLET, COATED ORAL
Qty: 90 TABLET | Refills: 0 | Status: SHIPPED | OUTPATIENT
Start: 2019-06-11 | End: 2019-08-27 | Stop reason: SDUPTHER

## 2019-07-22 RX ORDER — INSULIN DETEMIR 100 [IU]/ML
INJECTION, SOLUTION SUBCUTANEOUS
Qty: 3 PEN | Refills: 3 | Status: SHIPPED | OUTPATIENT
Start: 2019-07-22 | End: 2019-11-06

## 2019-07-31 ENCOUNTER — HOSPITAL ENCOUNTER (OUTPATIENT)
Age: 44
Discharge: HOME | End: 2019-08-02
Payer: COMMERCIAL

## 2019-07-31 ENCOUNTER — OFFICE VISIT (OUTPATIENT)
Dept: FAMILY MEDICINE CLINIC | Age: 44
End: 2019-07-31
Payer: COMMERCIAL

## 2019-07-31 VITALS
DIASTOLIC BLOOD PRESSURE: 75 MMHG | WEIGHT: 299.6 LBS | HEIGHT: 69 IN | RESPIRATION RATE: 16 BRPM | OXYGEN SATURATION: 95 % | SYSTOLIC BLOOD PRESSURE: 116 MMHG | BODY MASS INDEX: 44.38 KG/M2 | TEMPERATURE: 98.8 F | HEART RATE: 91 BPM

## 2019-07-31 DIAGNOSIS — Z80.42 FAMILY HISTORY OF PROSTATE CANCER IN FATHER: ICD-10-CM

## 2019-07-31 DIAGNOSIS — E11.65 UNCONTROLLED TYPE 2 DIABETES MELLITUS WITH HYPERGLYCEMIA (HCC): Primary | ICD-10-CM

## 2019-07-31 DIAGNOSIS — E11.65 UNCONTROLLED TYPE 2 DIABETES MELLITUS WITH HYPERGLYCEMIA (HCC): ICD-10-CM

## 2019-07-31 DIAGNOSIS — I10 ESSENTIAL HYPERTENSION: ICD-10-CM

## 2019-07-31 DIAGNOSIS — N20.0 KIDNEY STONE: ICD-10-CM

## 2019-07-31 DIAGNOSIS — E66.01 MORBID OBESITY DUE TO EXCESS CALORIES (HCC): ICD-10-CM

## 2019-07-31 LAB
ALBUMIN SERPL-MCNC: 4.4 G/DL (ref 3.5–5.2)
ALP BLD-CCNC: 70 U/L (ref 40–129)
ALT SERPL-CCNC: 30 U/L (ref 0–40)
ANION GAP SERPL CALCULATED.3IONS-SCNC: 18 MMOL/L (ref 7–16)
AST SERPL-CCNC: 28 U/L (ref 0–39)
BASOPHILS ABSOLUTE: 0.05 E9/L (ref 0–0.2)
BASOPHILS RELATIVE PERCENT: 0.5 % (ref 0–2)
BILIRUB SERPL-MCNC: 0.4 MG/DL (ref 0–1.2)
BUN BLDV-MCNC: 12 MG/DL (ref 6–20)
CALCIUM SERPL-MCNC: 9.5 MG/DL (ref 8.6–10.2)
CHLORIDE BLD-SCNC: 102 MMOL/L (ref 98–107)
CHOLESTEROL, TOTAL: 166 MG/DL (ref 0–199)
CO2: 24 MMOL/L (ref 22–29)
CREAT SERPL-MCNC: 0.8 MG/DL (ref 0.7–1.2)
EOSINOPHILS ABSOLUTE: 0.27 E9/L (ref 0.05–0.5)
EOSINOPHILS RELATIVE PERCENT: 2.8 % (ref 0–6)
GFR AFRICAN AMERICAN: >60
GFR NON-AFRICAN AMERICAN: >60 ML/MIN/1.73
GLUCOSE BLD-MCNC: 29 MG/DL (ref 74–99)
HBA1C MFR BLD: 8.4 % (ref 4–5.6)
HCT VFR BLD CALC: 52.9 % (ref 37–54)
HDLC SERPL-MCNC: 37 MG/DL
HEMOGLOBIN: 16 G/DL (ref 12.5–16.5)
IMMATURE GRANULOCYTES #: 0.04 E9/L
IMMATURE GRANULOCYTES %: 0.4 % (ref 0–5)
LDL CHOLESTEROL CALCULATED: 76 MG/DL (ref 0–99)
LYMPHOCYTES ABSOLUTE: 3.55 E9/L (ref 1.5–4)
LYMPHOCYTES RELATIVE PERCENT: 36.7 % (ref 20–42)
MCH RBC QN AUTO: 26.6 PG (ref 26–35)
MCHC RBC AUTO-ENTMCNC: 30.2 % (ref 32–34.5)
MCV RBC AUTO: 88 FL (ref 80–99.9)
MONOCYTES ABSOLUTE: 0.91 E9/L (ref 0.1–0.95)
MONOCYTES RELATIVE PERCENT: 9.4 % (ref 2–12)
NEUTROPHILS ABSOLUTE: 4.86 E9/L (ref 1.8–7.3)
NEUTROPHILS RELATIVE PERCENT: 50.2 % (ref 43–80)
PDW BLD-RTO: 16.2 FL (ref 11.5–15)
PLATELET # BLD: 343 E9/L (ref 130–450)
PMV BLD AUTO: 10.2 FL (ref 7–12)
POTASSIUM SERPL-SCNC: 4.4 MMOL/L (ref 3.5–5)
PROSTATE SPECIFIC ANTIGEN: 1.25 NG/ML (ref 0–4)
RBC # BLD: 6.01 E12/L (ref 3.8–5.8)
SODIUM BLD-SCNC: 144 MMOL/L (ref 132–146)
TOTAL PROTEIN: 7.8 G/DL (ref 6.4–8.3)
TRIGL SERPL-MCNC: 266 MG/DL (ref 0–149)
VLDLC SERPL CALC-MCNC: 53 MG/DL
WBC # BLD: 9.7 E9/L (ref 4.5–11.5)

## 2019-07-31 PROCEDURE — 1036F TOBACCO NON-USER: CPT | Performed by: FAMILY MEDICINE

## 2019-07-31 PROCEDURE — 80061 LIPID PANEL: CPT

## 2019-07-31 PROCEDURE — 3045F PR MOST RECENT HEMOGLOBIN A1C LEVEL 7.0-9.0%: CPT | Performed by: FAMILY MEDICINE

## 2019-07-31 PROCEDURE — 2022F DILAT RTA XM EVC RTNOPTHY: CPT | Performed by: FAMILY MEDICINE

## 2019-07-31 PROCEDURE — G0103 PSA SCREENING: HCPCS

## 2019-07-31 PROCEDURE — G8427 DOCREV CUR MEDS BY ELIG CLIN: HCPCS | Performed by: FAMILY MEDICINE

## 2019-07-31 PROCEDURE — 80053 COMPREHEN METABOLIC PANEL: CPT

## 2019-07-31 PROCEDURE — 99214 OFFICE O/P EST MOD 30 MIN: CPT | Performed by: FAMILY MEDICINE

## 2019-07-31 PROCEDURE — 83036 HEMOGLOBIN GLYCOSYLATED A1C: CPT

## 2019-07-31 PROCEDURE — G8417 CALC BMI ABV UP PARAM F/U: HCPCS | Performed by: FAMILY MEDICINE

## 2019-07-31 PROCEDURE — 85025 COMPLETE CBC W/AUTO DIFF WBC: CPT

## 2019-07-31 RX ORDER — OXYCODONE AND ACETAMINOPHEN 10; 325 MG/1; MG/1
1 TABLET ORAL EVERY 6 HOURS PRN
Qty: 42 TABLET | Refills: 0 | Status: SHIPPED | OUTPATIENT
Start: 2019-07-31 | End: 2019-08-01 | Stop reason: SDUPTHER

## 2019-07-31 RX ORDER — MUPIROCIN CALCIUM 20 MG/G
CREAM TOPICAL
Qty: 30 G | Refills: 0 | Status: SHIPPED | OUTPATIENT
Start: 2019-07-31 | End: 2019-08-30

## 2019-07-31 NOTE — PROGRESS NOTES
DM2:   Patient is here to fu regarding DM2. Patient is not controlled. Taking all medications and tolerating well. Fasting sugars are running not checking. Patient is taking ASA and Ace Inhibitor/ARB. Patient is  on appropriately-dosed statin. LDL is  at goal.  BP is  controlled. No hypoglycemic episodes. Patient does not see Podiatry regularly. Saw an Eye Dr 2018. Patient is aware that it is necessary to see an Eye Dr yearly. Patient does not smoke. Most recent labs reviewed with patient. Patient does not have complaints or concerns today. He is still grieving and taking antidepressant. He thinks it is helping as he is not crying as much. Lab Results   Component Value Date    LABA1C 8.5 (H) 04/24/2019       Lab Results   Component Value Date    LDLCALC 80 04/24/2019        Patient's past medical, surgical, social and/or family history reviewed, updated in chart, and are non-contributory (unless otherwise stated). Medications and allergies also reviewed and updated in chart.       Review of Systems:  Constitutional:  No fever, no fatigue, no chills, no headaches, no weight change  Dermatology:  No rash, no mole, no dry or sensitive skin  ENT:  No cough, no sore throat, no sinus pain, no runny nose, no ear pain  Cardiology:  No chest pain, no palpitations, no leg edema, no shortness of breath, no PND  Gastroenterology:  No dysphagia, no abdominal pain, no nausea, no vomiting, no constipation, no diarrhea, no heartburn  Musculoskeletal:  No joint pain, no leg cramps, no back pain, no muscle aches  Respiratory:  No shortness of breath, no orthopnea, no wheezing, no GARCIA, no hemoptysis  Urology:  No blood in the urine, no urinary frequency, no urinary incontinence, no urinary urgency, no nocturia, no dysuria    Vitals:    07/31/19 0723   BP: 116/75   Pulse: 91   Resp: 16   Temp: 98.8 °F (37.1 °C)   TempSrc: Oral   SpO2: 95%   Weight: 299 lb 9.6 oz (135.9 kg)   Height: 5' 9\" (1.753 m)       General: medication issues, and read all Rx info from pharmacy to assure aware of all possible risks and side effects of medication before taking. Reviewed age and gender appropriate health screening exams and vaccinations. Advised patient regarding importance of keeping up with recommended health maintenance and to schedule as soon as possible if overdue, as this is important in assessing for undiagnosed pathology, especially cancer, as well as protecting against potentially harmful/life threatening disease. Patient and/or guardian verbalizes understanding and agrees with above counseling, assessment and plan. All questions answered. Cherise Hatchet, MD  7/31/2019    I have personally reviewed and updated the chief complaint, HPI, Past Medical, Family and Social History, as well as the above Review of Systems.

## 2019-08-01 DIAGNOSIS — N20.0 KIDNEY STONE: ICD-10-CM

## 2019-08-01 RX ORDER — OXYCODONE AND ACETAMINOPHEN 10; 325 MG/1; MG/1
1 TABLET ORAL EVERY 6 HOURS PRN
Qty: 42 TABLET | Refills: 0 | Status: SHIPPED | OUTPATIENT
Start: 2019-08-01 | End: 2019-12-17 | Stop reason: SDUPTHER

## 2019-08-26 RX ORDER — ESCITALOPRAM OXALATE 10 MG/1
TABLET ORAL
Qty: 90 TABLET | Refills: 1 | Status: SHIPPED
Start: 2019-08-26 | End: 2020-02-17

## 2019-08-27 RX ORDER — ROSUVASTATIN CALCIUM 10 MG/1
TABLET, COATED ORAL
Qty: 90 TABLET | Refills: 0 | Status: SHIPPED | OUTPATIENT
Start: 2019-08-27 | End: 2019-11-21 | Stop reason: SDUPTHER

## 2019-11-06 ENCOUNTER — OFFICE VISIT (OUTPATIENT)
Dept: FAMILY MEDICINE CLINIC | Age: 44
End: 2019-11-06
Payer: COMMERCIAL

## 2019-11-06 ENCOUNTER — HOSPITAL ENCOUNTER (OUTPATIENT)
Age: 44
Discharge: HOME | End: 2019-11-08
Payer: COMMERCIAL

## 2019-11-06 VITALS
DIASTOLIC BLOOD PRESSURE: 89 MMHG | RESPIRATION RATE: 16 BRPM | HEART RATE: 69 BPM | OXYGEN SATURATION: 72 % | HEIGHT: 70 IN | TEMPERATURE: 98.9 F | WEIGHT: 287.8 LBS | SYSTOLIC BLOOD PRESSURE: 137 MMHG | BODY MASS INDEX: 41.2 KG/M2

## 2019-11-06 DIAGNOSIS — I10 ESSENTIAL HYPERTENSION: ICD-10-CM

## 2019-11-06 DIAGNOSIS — E66.01 MORBID OBESITY DUE TO EXCESS CALORIES (HCC): ICD-10-CM

## 2019-11-06 DIAGNOSIS — F41.8 DEPRESSION WITH ANXIETY: ICD-10-CM

## 2019-11-06 DIAGNOSIS — E11.65 UNCONTROLLED TYPE 2 DIABETES MELLITUS WITH HYPERGLYCEMIA (HCC): Primary | ICD-10-CM

## 2019-11-06 DIAGNOSIS — E11.65 UNCONTROLLED TYPE 2 DIABETES MELLITUS WITH HYPERGLYCEMIA (HCC): ICD-10-CM

## 2019-11-06 DIAGNOSIS — E78.5 HYPERLIPIDEMIA, UNSPECIFIED HYPERLIPIDEMIA TYPE: ICD-10-CM

## 2019-11-06 LAB
BASOPHILS ABSOLUTE: 0.04 E9/L (ref 0–0.2)
BASOPHILS RELATIVE PERCENT: 0.5 % (ref 0–2)
EOSINOPHILS ABSOLUTE: 0.25 E9/L (ref 0.05–0.5)
EOSINOPHILS RELATIVE PERCENT: 3.2 % (ref 0–6)
HCT VFR BLD CALC: 51.9 % (ref 37–54)
HEMOGLOBIN: 15.4 G/DL (ref 12.5–16.5)
IMMATURE GRANULOCYTES #: 0.02 E9/L
IMMATURE GRANULOCYTES %: 0.3 % (ref 0–5)
LYMPHOCYTES ABSOLUTE: 2.22 E9/L (ref 1.5–4)
LYMPHOCYTES RELATIVE PERCENT: 28.4 % (ref 20–42)
MCH RBC QN AUTO: 27.2 PG (ref 26–35)
MCHC RBC AUTO-ENTMCNC: 29.7 % (ref 32–34.5)
MCV RBC AUTO: 91.7 FL (ref 80–99.9)
MONOCYTES ABSOLUTE: 0.68 E9/L (ref 0.1–0.95)
MONOCYTES RELATIVE PERCENT: 8.7 % (ref 2–12)
NEUTROPHILS ABSOLUTE: 4.61 E9/L (ref 1.8–7.3)
NEUTROPHILS RELATIVE PERCENT: 58.9 % (ref 43–80)
PDW BLD-RTO: 15.5 FL (ref 11.5–15)
PLATELET # BLD: 338 E9/L (ref 130–450)
PMV BLD AUTO: 10.8 FL (ref 7–12)
RBC # BLD: 5.66 E12/L (ref 3.8–5.8)
WBC # BLD: 7.8 E9/L (ref 4.5–11.5)

## 2019-11-06 PROCEDURE — 80061 LIPID PANEL: CPT

## 2019-11-06 PROCEDURE — 83036 HEMOGLOBIN GLYCOSYLATED A1C: CPT

## 2019-11-06 PROCEDURE — G8427 DOCREV CUR MEDS BY ELIG CLIN: HCPCS | Performed by: FAMILY MEDICINE

## 2019-11-06 PROCEDURE — 2022F DILAT RTA XM EVC RTNOPTHY: CPT | Performed by: FAMILY MEDICINE

## 2019-11-06 PROCEDURE — 1036F TOBACCO NON-USER: CPT | Performed by: FAMILY MEDICINE

## 2019-11-06 PROCEDURE — G8484 FLU IMMUNIZE NO ADMIN: HCPCS | Performed by: FAMILY MEDICINE

## 2019-11-06 PROCEDURE — 85025 COMPLETE CBC W/AUTO DIFF WBC: CPT

## 2019-11-06 PROCEDURE — 99214 OFFICE O/P EST MOD 30 MIN: CPT | Performed by: FAMILY MEDICINE

## 2019-11-06 PROCEDURE — 80053 COMPREHEN METABOLIC PANEL: CPT

## 2019-11-06 PROCEDURE — G8417 CALC BMI ABV UP PARAM F/U: HCPCS | Performed by: FAMILY MEDICINE

## 2019-11-07 LAB
ALBUMIN SERPL-MCNC: 4.5 G/DL (ref 3.5–5.2)
ALP BLD-CCNC: 62 U/L (ref 40–129)
ALT SERPL-CCNC: 30 U/L (ref 0–40)
ANION GAP SERPL CALCULATED.3IONS-SCNC: 16 MMOL/L (ref 7–16)
AST SERPL-CCNC: 29 U/L (ref 0–39)
BILIRUB SERPL-MCNC: 0.4 MG/DL (ref 0–1.2)
BUN BLDV-MCNC: 8 MG/DL (ref 6–20)
CALCIUM SERPL-MCNC: 9.5 MG/DL (ref 8.6–10.2)
CHLORIDE BLD-SCNC: 99 MMOL/L (ref 98–107)
CHOLESTEROL, TOTAL: 168 MG/DL (ref 0–199)
CO2: 23 MMOL/L (ref 22–29)
CREAT SERPL-MCNC: 0.7 MG/DL (ref 0.7–1.2)
GFR AFRICAN AMERICAN: >60
GFR NON-AFRICAN AMERICAN: >60 ML/MIN/1.73
GLUCOSE BLD-MCNC: 222 MG/DL (ref 74–99)
HBA1C MFR BLD: 11.1 % (ref 4–5.6)
HDLC SERPL-MCNC: 41 MG/DL
LDL CHOLESTEROL CALCULATED: 82 MG/DL (ref 0–99)
POTASSIUM SERPL-SCNC: 4.7 MMOL/L (ref 3.5–5)
SODIUM BLD-SCNC: 138 MMOL/L (ref 132–146)
TOTAL PROTEIN: 7.3 G/DL (ref 6.4–8.3)
TRIGL SERPL-MCNC: 226 MG/DL (ref 0–149)
VLDLC SERPL CALC-MCNC: 45 MG/DL

## 2019-11-21 RX ORDER — ROSUVASTATIN CALCIUM 10 MG/1
TABLET, COATED ORAL
Qty: 90 TABLET | Refills: 0 | Status: SHIPPED
Start: 2019-11-21 | End: 2020-02-17

## 2019-12-17 ENCOUNTER — OFFICE VISIT (OUTPATIENT)
Dept: FAMILY MEDICINE CLINIC | Age: 44
End: 2019-12-17
Payer: COMMERCIAL

## 2019-12-17 ENCOUNTER — HOSPITAL ENCOUNTER (OUTPATIENT)
Age: 44
Discharge: HOME | End: 2019-12-19
Payer: COMMERCIAL

## 2019-12-17 VITALS
HEIGHT: 70 IN | OXYGEN SATURATION: 95 % | TEMPERATURE: 99.3 F | BODY MASS INDEX: 39.14 KG/M2 | SYSTOLIC BLOOD PRESSURE: 137 MMHG | WEIGHT: 273.4 LBS | DIASTOLIC BLOOD PRESSURE: 88 MMHG | HEART RATE: 116 BPM | RESPIRATION RATE: 16 BRPM

## 2019-12-17 DIAGNOSIS — N20.0 KIDNEY STONE: ICD-10-CM

## 2019-12-17 DIAGNOSIS — L02.212 BACK ABSCESS: ICD-10-CM

## 2019-12-17 DIAGNOSIS — L02.212 BACK ABSCESS: Primary | ICD-10-CM

## 2019-12-17 PROCEDURE — 99213 OFFICE O/P EST LOW 20 MIN: CPT | Performed by: FAMILY MEDICINE

## 2019-12-17 PROCEDURE — G8427 DOCREV CUR MEDS BY ELIG CLIN: HCPCS | Performed by: FAMILY MEDICINE

## 2019-12-17 PROCEDURE — 87186 SC STD MICRODIL/AGAR DIL: CPT

## 2019-12-17 PROCEDURE — 10060 I&D ABSCESS SIMPLE/SINGLE: CPT | Performed by: FAMILY MEDICINE

## 2019-12-17 PROCEDURE — 87070 CULTURE OTHR SPECIMN AEROBIC: CPT

## 2019-12-17 PROCEDURE — G8417 CALC BMI ABV UP PARAM F/U: HCPCS | Performed by: FAMILY MEDICINE

## 2019-12-17 PROCEDURE — 87205 SMEAR GRAM STAIN: CPT

## 2019-12-17 PROCEDURE — 1036F TOBACCO NON-USER: CPT | Performed by: FAMILY MEDICINE

## 2019-12-17 PROCEDURE — G8484 FLU IMMUNIZE NO ADMIN: HCPCS | Performed by: FAMILY MEDICINE

## 2019-12-17 RX ORDER — DOXYCYCLINE HYCLATE 100 MG/1
100 CAPSULE ORAL 2 TIMES DAILY
Qty: 20 CAPSULE | Refills: 0 | Status: SHIPPED
Start: 2019-12-17 | End: 2020-06-03 | Stop reason: SDUPTHER

## 2019-12-17 RX ORDER — OXYCODONE AND ACETAMINOPHEN 10; 325 MG/1; MG/1
1 TABLET ORAL EVERY 6 HOURS PRN
Qty: 42 TABLET | Refills: 0 | Status: SHIPPED | OUTPATIENT
Start: 2019-12-17 | End: 2020-06-03 | Stop reason: SDUPTHER

## 2019-12-17 RX ORDER — CEPHALEXIN 500 MG/1
500 CAPSULE ORAL 3 TIMES DAILY
Qty: 30 CAPSULE | Refills: 0 | Status: SHIPPED
Start: 2019-12-17 | End: 2020-02-19

## 2019-12-20 ENCOUNTER — TELEPHONE (OUTPATIENT)
Dept: FAMILY MEDICINE CLINIC | Age: 44
End: 2019-12-20

## 2019-12-21 LAB
GRAM STAIN RESULT: ABNORMAL
ORGANISM: ABNORMAL
WOUND/ABSCESS: ABNORMAL

## 2020-02-17 RX ORDER — ROSUVASTATIN CALCIUM 10 MG/1
TABLET, COATED ORAL
Qty: 90 TABLET | Refills: 0 | Status: SHIPPED
Start: 2020-02-17 | End: 2020-02-19

## 2020-02-17 RX ORDER — ESCITALOPRAM OXALATE 10 MG/1
TABLET ORAL
Qty: 90 TABLET | Refills: 1 | Status: SHIPPED
Start: 2020-02-17 | End: 2020-02-19 | Stop reason: SDUPTHER

## 2020-02-19 ENCOUNTER — OFFICE VISIT (OUTPATIENT)
Dept: FAMILY MEDICINE CLINIC | Age: 45
End: 2020-02-19
Payer: COMMERCIAL

## 2020-02-19 ENCOUNTER — HOSPITAL ENCOUNTER (OUTPATIENT)
Age: 45
Discharge: HOME | End: 2020-02-21
Payer: COMMERCIAL

## 2020-02-19 ENCOUNTER — CARE COORDINATION (OUTPATIENT)
Dept: FAMILY MEDICINE CLINIC | Age: 45
End: 2020-02-19

## 2020-02-19 VITALS
WEIGHT: 275.8 LBS | SYSTOLIC BLOOD PRESSURE: 117 MMHG | BODY MASS INDEX: 40.85 KG/M2 | OXYGEN SATURATION: 96 % | HEIGHT: 69 IN | TEMPERATURE: 98.4 F | DIASTOLIC BLOOD PRESSURE: 81 MMHG | HEART RATE: 77 BPM | RESPIRATION RATE: 16 BRPM

## 2020-02-19 LAB
CREATININE URINE POCT: NORMAL
MICROALBUMIN/CREAT 24H UR: NORMAL MG/G{CREAT}
MICROALBUMIN/CREAT UR-RTO: NORMAL

## 2020-02-19 PROCEDURE — 82044 UR ALBUMIN SEMIQUANTITATIVE: CPT | Performed by: FAMILY MEDICINE

## 2020-02-19 PROCEDURE — 83036 HEMOGLOBIN GLYCOSYLATED A1C: CPT

## 2020-02-19 PROCEDURE — G8484 FLU IMMUNIZE NO ADMIN: HCPCS | Performed by: FAMILY MEDICINE

## 2020-02-19 PROCEDURE — 80061 LIPID PANEL: CPT

## 2020-02-19 PROCEDURE — 80053 COMPREHEN METABOLIC PANEL: CPT

## 2020-02-19 PROCEDURE — 99396 PREV VISIT EST AGE 40-64: CPT | Performed by: FAMILY MEDICINE

## 2020-02-19 PROCEDURE — 85025 COMPLETE CBC W/AUTO DIFF WBC: CPT

## 2020-02-19 RX ORDER — ESCITALOPRAM OXALATE 10 MG/1
TABLET ORAL
Qty: 90 TABLET | Refills: 1 | Status: SHIPPED
Start: 2020-02-19 | End: 2020-11-24 | Stop reason: SDUPTHER

## 2020-02-19 RX ORDER — CIPROFLOXACIN AND DEXAMETHASONE 3; 1 MG/ML; MG/ML
4 SUSPENSION/ DROPS AURICULAR (OTIC) 2 TIMES DAILY
Qty: 1 BOTTLE | Refills: 0 | Status: SHIPPED | OUTPATIENT
Start: 2020-02-19 | End: 2020-02-26

## 2020-02-19 SDOH — ECONOMIC STABILITY: FOOD INSECURITY: WITHIN THE PAST 12 MONTHS, YOU WORRIED THAT YOUR FOOD WOULD RUN OUT BEFORE YOU GOT MONEY TO BUY MORE.: NEVER TRUE

## 2020-02-19 SDOH — ECONOMIC STABILITY: INCOME INSECURITY: HOW HARD IS IT FOR YOU TO PAY FOR THE VERY BASICS LIKE FOOD, HOUSING, MEDICAL CARE, AND HEATING?: NOT HARD AT ALL

## 2020-02-19 SDOH — ECONOMIC STABILITY: FOOD INSECURITY: WITHIN THE PAST 12 MONTHS, THE FOOD YOU BOUGHT JUST DIDN'T LAST AND YOU DIDN'T HAVE MONEY TO GET MORE.: NEVER TRUE

## 2020-02-19 ASSESSMENT — PATIENT HEALTH QUESTIONNAIRE - PHQ9
1. LITTLE INTEREST OR PLEASURE IN DOING THINGS: 0
SUM OF ALL RESPONSES TO PHQ QUESTIONS 1-9: 0
2. FEELING DOWN, DEPRESSED OR HOPELESS: 0
SUM OF ALL RESPONSES TO PHQ9 QUESTIONS 1 & 2: 0
SUM OF ALL RESPONSES TO PHQ QUESTIONS 1-9: 0

## 2020-02-19 NOTE — PROGRESS NOTES
Annual exam:  Patient is here for routine yearly physical/preventative visit. Patient has no complaints or concerns today. Patient is  generally healthy. Chronic medical conditions are generally controlled. Most recent labs reviewed with patient and  are remarkable. Health maintenance reviewed with patient and is not up to date. Overall doing well. He admits to not taking insulin regularly.      Past Medical History:   Diagnosis Date    Hyperlipidemia     Hypertension     Kidney stones     Multiple times    Type II or unspecified type diabetes mellitus without mention of complication, not stated as uncontrolled       Past Surgical History:   Procedure Laterality Date    FRACTURE SURGERY  1992    Lt leg & ankle fx    TONSILLECTOMY  2000      Family History   Problem Relation Age of Onset    Diabetes Mother     High Blood Pressure Mother     Cancer Father         prostate    Heart Disease Father         MI @ 52yrs old/ also 1 stent     Social History     Socioeconomic History    Marital status:      Spouse name: Not on file    Number of children: Not on file    Years of education: Not on file    Highest education level: Not on file   Occupational History    Not on file   Social Needs    Financial resource strain: Not hard at all   [employer] insecurity:     Worry: Never true     Inability: Never true   Grokker needs:     Medical: Not on file     Non-medical: Not on file   Tobacco Use    Smoking status: Never Smoker    Smokeless tobacco: Never Used   Substance and Sexual Activity    Alcohol use: No    Drug use: No    Sexual activity: Not on file   Lifestyle    Physical activity:     Days per week: Not on file     Minutes per session: Not on file    Stress: Not on file   Relationships    Social connections:     Talks on phone: Not on file     Gets together: Not on file     Attends [employer] service: Not on file     Active member of club or organization: Not on file

## 2020-02-20 ENCOUNTER — TELEPHONE (OUTPATIENT)
Dept: FAMILY MEDICINE CLINIC | Age: 45
End: 2020-02-20

## 2020-02-20 LAB
ALBUMIN SERPL-MCNC: 4.2 G/DL (ref 3.5–5.2)
ALP BLD-CCNC: 63 U/L (ref 40–129)
ALT SERPL-CCNC: 21 U/L (ref 0–40)
ANION GAP SERPL CALCULATED.3IONS-SCNC: 19 MMOL/L (ref 7–16)
AST SERPL-CCNC: 23 U/L (ref 0–39)
BASOPHILS ABSOLUTE: 0.05 E9/L (ref 0–0.2)
BASOPHILS RELATIVE PERCENT: 0.7 % (ref 0–2)
BILIRUB SERPL-MCNC: 0.3 MG/DL (ref 0–1.2)
BUN BLDV-MCNC: 13 MG/DL (ref 6–20)
CALCIUM SERPL-MCNC: 9.3 MG/DL (ref 8.6–10.2)
CHLORIDE BLD-SCNC: 99 MMOL/L (ref 98–107)
CHOLESTEROL, TOTAL: 205 MG/DL (ref 0–199)
CO2: 20 MMOL/L (ref 22–29)
CREAT SERPL-MCNC: 0.6 MG/DL (ref 0.7–1.2)
EOSINOPHILS ABSOLUTE: 0.24 E9/L (ref 0.05–0.5)
EOSINOPHILS RELATIVE PERCENT: 3.1 % (ref 0–6)
GFR AFRICAN AMERICAN: >60
GFR NON-AFRICAN AMERICAN: >60 ML/MIN/1.73
GLUCOSE BLD-MCNC: 161 MG/DL (ref 74–99)
HBA1C MFR BLD: 11.8 % (ref 4–5.6)
HCT VFR BLD CALC: 53.8 % (ref 37–54)
HDLC SERPL-MCNC: 38 MG/DL
HEMOGLOBIN: 15.8 G/DL (ref 12.5–16.5)
IMMATURE GRANULOCYTES #: 0.02 E9/L
IMMATURE GRANULOCYTES %: 0.3 % (ref 0–5)
LDL CHOLESTEROL CALCULATED: 90 MG/DL (ref 0–99)
LYMPHOCYTES ABSOLUTE: 2.17 E9/L (ref 1.5–4)
LYMPHOCYTES RELATIVE PERCENT: 28.5 % (ref 20–42)
MCH RBC QN AUTO: 27.5 PG (ref 26–35)
MCHC RBC AUTO-ENTMCNC: 29.4 % (ref 32–34.5)
MCV RBC AUTO: 93.7 FL (ref 80–99.9)
MONOCYTES ABSOLUTE: 0.86 E9/L (ref 0.1–0.95)
MONOCYTES RELATIVE PERCENT: 11.3 % (ref 2–12)
NEUTROPHILS ABSOLUTE: 4.28 E9/L (ref 1.8–7.3)
NEUTROPHILS RELATIVE PERCENT: 56.1 % (ref 43–80)
PDW BLD-RTO: 14.6 FL (ref 11.5–15)
PLATELET # BLD: 302 E9/L (ref 130–450)
PMV BLD AUTO: 11.5 FL (ref 7–12)
POTASSIUM SERPL-SCNC: 4.6 MMOL/L (ref 3.5–5)
RBC # BLD: 5.74 E12/L (ref 3.8–5.8)
SODIUM BLD-SCNC: 138 MMOL/L (ref 132–146)
TOTAL PROTEIN: 7.2 G/DL (ref 6.4–8.3)
TRIGL SERPL-MCNC: 386 MG/DL (ref 0–149)
VLDLC SERPL CALC-MCNC: 77 MG/DL
WBC # BLD: 7.6 E9/L (ref 4.5–11.5)

## 2020-02-20 RX ORDER — CLOTRIMAZOLE AND BETAMETHASONE DIPROPIONATE 10; .64 MG/G; MG/G
CREAM TOPICAL
Qty: 45 G | Refills: 0 | Status: SHIPPED
Start: 2020-02-20 | End: 2020-04-15

## 2020-03-10 RX ORDER — ALLOPURINOL 300 MG/1
TABLET ORAL
Qty: 90 TABLET | Refills: 3 | Status: SHIPPED
Start: 2020-03-10 | End: 2022-06-13

## 2020-03-10 RX ORDER — SITAGLIPTIN AND METFORMIN HYDROCHLORIDE 1000; 50 MG/1; MG/1
TABLET, FILM COATED ORAL
Qty: 180 TABLET | Refills: 3 | Status: SHIPPED
Start: 2020-03-10 | End: 2021-06-29

## 2020-04-15 RX ORDER — LISINOPRIL 20 MG/1
TABLET ORAL
Qty: 90 TABLET | Refills: 3 | Status: SHIPPED
Start: 2020-04-15 | End: 2021-05-17

## 2020-04-15 RX ORDER — CLOTRIMAZOLE AND BETAMETHASONE DIPROPIONATE 10; .64 MG/G; MG/G
CREAM TOPICAL
Qty: 45 G | Refills: 0 | Status: SHIPPED
Start: 2020-04-15 | End: 2020-06-04

## 2020-06-03 ENCOUNTER — OFFICE VISIT (OUTPATIENT)
Dept: FAMILY MEDICINE CLINIC | Age: 45
End: 2020-06-03
Payer: COMMERCIAL

## 2020-06-03 ENCOUNTER — HOSPITAL ENCOUNTER (OUTPATIENT)
Age: 45
Discharge: HOME | End: 2020-06-05
Payer: COMMERCIAL

## 2020-06-03 VITALS
SYSTOLIC BLOOD PRESSURE: 130 MMHG | HEART RATE: 80 BPM | TEMPERATURE: 98.7 F | HEIGHT: 69 IN | DIASTOLIC BLOOD PRESSURE: 84 MMHG | BODY MASS INDEX: 40.88 KG/M2 | WEIGHT: 276 LBS | RESPIRATION RATE: 16 BRPM | OXYGEN SATURATION: 97 %

## 2020-06-03 LAB
ALBUMIN SERPL-MCNC: 4.5 G/DL (ref 3.5–5.2)
ALP BLD-CCNC: 65 U/L (ref 40–129)
ALT SERPL-CCNC: 18 U/L (ref 0–40)
ANION GAP SERPL CALCULATED.3IONS-SCNC: 16 MMOL/L (ref 7–16)
AST SERPL-CCNC: 17 U/L (ref 0–39)
BASOPHILS ABSOLUTE: 0.05 E9/L (ref 0–0.2)
BASOPHILS RELATIVE PERCENT: 0.7 % (ref 0–2)
BILIRUB SERPL-MCNC: 0.4 MG/DL (ref 0–1.2)
BUN BLDV-MCNC: 12 MG/DL (ref 6–20)
CALCIUM SERPL-MCNC: 9.5 MG/DL (ref 8.6–10.2)
CHLORIDE BLD-SCNC: 99 MMOL/L (ref 98–107)
CHOLESTEROL, TOTAL: 213 MG/DL (ref 0–199)
CO2: 24 MMOL/L (ref 22–29)
CREAT SERPL-MCNC: 0.6 MG/DL (ref 0.7–1.2)
EOSINOPHILS ABSOLUTE: 0.2 E9/L (ref 0.05–0.5)
EOSINOPHILS RELATIVE PERCENT: 2.9 % (ref 0–6)
GFR AFRICAN AMERICAN: >60
GFR NON-AFRICAN AMERICAN: >60 ML/MIN/1.73
GLUCOSE BLD-MCNC: 263 MG/DL (ref 74–99)
HCT VFR BLD CALC: 53.2 % (ref 37–54)
HDLC SERPL-MCNC: 42 MG/DL
HEMOGLOBIN: 16.3 G/DL (ref 12.5–16.5)
IMMATURE GRANULOCYTES #: 0.02 E9/L
IMMATURE GRANULOCYTES %: 0.3 % (ref 0–5)
LDL CHOLESTEROL CALCULATED: 108 MG/DL (ref 0–99)
LYMPHOCYTES ABSOLUTE: 2.44 E9/L (ref 1.5–4)
LYMPHOCYTES RELATIVE PERCENT: 34.9 % (ref 20–42)
MCH RBC QN AUTO: 27.9 PG (ref 26–35)
MCHC RBC AUTO-ENTMCNC: 30.6 % (ref 32–34.5)
MCV RBC AUTO: 90.9 FL (ref 80–99.9)
MONOCYTES ABSOLUTE: 0.7 E9/L (ref 0.1–0.95)
MONOCYTES RELATIVE PERCENT: 10 % (ref 2–12)
NEUTROPHILS ABSOLUTE: 3.58 E9/L (ref 1.8–7.3)
NEUTROPHILS RELATIVE PERCENT: 51.2 % (ref 43–80)
PDW BLD-RTO: 14.3 FL (ref 11.5–15)
PLATELET # BLD: 318 E9/L (ref 130–450)
PMV BLD AUTO: 10.8 FL (ref 7–12)
POTASSIUM SERPL-SCNC: 5 MMOL/L (ref 3.5–5)
RBC # BLD: 5.85 E12/L (ref 3.8–5.8)
SODIUM BLD-SCNC: 139 MMOL/L (ref 132–146)
TOTAL PROTEIN: 7.6 G/DL (ref 6.4–8.3)
TRIGL SERPL-MCNC: 314 MG/DL (ref 0–149)
VLDLC SERPL CALC-MCNC: 63 MG/DL
WBC # BLD: 7 E9/L (ref 4.5–11.5)

## 2020-06-03 PROCEDURE — 85025 COMPLETE CBC W/AUTO DIFF WBC: CPT

## 2020-06-03 PROCEDURE — 80061 LIPID PANEL: CPT

## 2020-06-03 PROCEDURE — 80053 COMPREHEN METABOLIC PANEL: CPT

## 2020-06-03 PROCEDURE — 99396 PREV VISIT EST AGE 40-64: CPT | Performed by: FAMILY MEDICINE

## 2020-06-03 PROCEDURE — 83036 HEMOGLOBIN GLYCOSYLATED A1C: CPT

## 2020-06-03 RX ORDER — DOXYCYCLINE HYCLATE 100 MG/1
100 CAPSULE ORAL 2 TIMES DAILY
Qty: 20 CAPSULE | Refills: 0 | Status: SHIPPED | OUTPATIENT
Start: 2020-06-03 | End: 2020-06-13

## 2020-06-03 RX ORDER — OXYCODONE AND ACETAMINOPHEN 10; 325 MG/1; MG/1
1 TABLET ORAL EVERY 6 HOURS PRN
Qty: 42 TABLET | Refills: 0 | Status: SHIPPED | OUTPATIENT
Start: 2020-06-03 | End: 2020-06-10

## 2020-06-03 RX ORDER — ALLOPURINOL 300 MG/1
TABLET ORAL
Qty: 90 TABLET | Refills: 3 | Status: SHIPPED
Start: 2020-06-03 | End: 2021-05-17

## 2020-06-04 LAB — HBA1C MFR BLD: 11.9 % (ref 4–5.6)

## 2020-09-02 ENCOUNTER — OFFICE VISIT (OUTPATIENT)
Dept: FAMILY MEDICINE CLINIC | Age: 45
End: 2020-09-02
Payer: COMMERCIAL

## 2020-09-02 ENCOUNTER — HOSPITAL ENCOUNTER (OUTPATIENT)
Age: 45
Discharge: HOME | End: 2020-09-04
Payer: COMMERCIAL

## 2020-09-02 VITALS
DIASTOLIC BLOOD PRESSURE: 79 MMHG | HEART RATE: 70 BPM | TEMPERATURE: 97.9 F | RESPIRATION RATE: 16 BRPM | OXYGEN SATURATION: 96 % | WEIGHT: 270.8 LBS | SYSTOLIC BLOOD PRESSURE: 125 MMHG | BODY MASS INDEX: 40.11 KG/M2 | HEIGHT: 69 IN

## 2020-09-02 LAB
ALBUMIN SERPL-MCNC: 4.4 G/DL (ref 3.5–5.2)
ALP BLD-CCNC: 62 U/L (ref 40–129)
ALT SERPL-CCNC: 17 U/L (ref 0–40)
ANION GAP SERPL CALCULATED.3IONS-SCNC: 20 MMOL/L (ref 7–16)
AST SERPL-CCNC: 18 U/L (ref 0–39)
BASOPHILS ABSOLUTE: 0.05 E9/L (ref 0–0.2)
BASOPHILS RELATIVE PERCENT: 0.7 % (ref 0–2)
BILIRUB SERPL-MCNC: 0.5 MG/DL (ref 0–1.2)
BUN BLDV-MCNC: 13 MG/DL (ref 6–20)
CALCIUM SERPL-MCNC: 9.7 MG/DL (ref 8.6–10.2)
CHLORIDE BLD-SCNC: 100 MMOL/L (ref 98–107)
CHOLESTEROL, TOTAL: 172 MG/DL (ref 0–199)
CO2: 19 MMOL/L (ref 22–29)
CREAT SERPL-MCNC: 0.6 MG/DL (ref 0.7–1.2)
EOSINOPHILS ABSOLUTE: 0.2 E9/L (ref 0.05–0.5)
EOSINOPHILS RELATIVE PERCENT: 2.9 % (ref 0–6)
GFR AFRICAN AMERICAN: >60
GFR NON-AFRICAN AMERICAN: >60 ML/MIN/1.73
GLUCOSE BLD-MCNC: 265 MG/DL (ref 74–99)
HBA1C MFR BLD: 10.8 % (ref 4–5.6)
HCT VFR BLD CALC: 51.8 % (ref 37–54)
HDLC SERPL-MCNC: 44 MG/DL
HEMOGLOBIN: 16 G/DL (ref 12.5–16.5)
IMMATURE GRANULOCYTES #: 0.02 E9/L
IMMATURE GRANULOCYTES %: 0.3 % (ref 0–5)
LDL CHOLESTEROL CALCULATED: 67 MG/DL (ref 0–99)
LYMPHOCYTES ABSOLUTE: 2.46 E9/L (ref 1.5–4)
LYMPHOCYTES RELATIVE PERCENT: 36.2 % (ref 20–42)
MCH RBC QN AUTO: 27.5 PG (ref 26–35)
MCHC RBC AUTO-ENTMCNC: 30.9 % (ref 32–34.5)
MCV RBC AUTO: 89 FL (ref 80–99.9)
MONOCYTES ABSOLUTE: 0.75 E9/L (ref 0.1–0.95)
MONOCYTES RELATIVE PERCENT: 11 % (ref 2–12)
NEUTROPHILS ABSOLUTE: 3.32 E9/L (ref 1.8–7.3)
NEUTROPHILS RELATIVE PERCENT: 48.9 % (ref 43–80)
PDW BLD-RTO: 14.3 FL (ref 11.5–15)
PLATELET # BLD: 300 E9/L (ref 130–450)
PMV BLD AUTO: 10.9 FL (ref 7–12)
POTASSIUM SERPL-SCNC: 4.6 MMOL/L (ref 3.5–5)
RBC # BLD: 5.82 E12/L (ref 3.8–5.8)
SODIUM BLD-SCNC: 139 MMOL/L (ref 132–146)
TOTAL PROTEIN: 7.5 G/DL (ref 6.4–8.3)
TRIGL SERPL-MCNC: 305 MG/DL (ref 0–149)
VLDLC SERPL CALC-MCNC: 61 MG/DL
WBC # BLD: 6.8 E9/L (ref 4.5–11.5)

## 2020-09-02 PROCEDURE — 80061 LIPID PANEL: CPT

## 2020-09-02 PROCEDURE — 83036 HEMOGLOBIN GLYCOSYLATED A1C: CPT

## 2020-09-02 PROCEDURE — 85025 COMPLETE CBC W/AUTO DIFF WBC: CPT

## 2020-09-02 PROCEDURE — 80053 COMPREHEN METABOLIC PANEL: CPT

## 2020-09-02 PROCEDURE — 99396 PREV VISIT EST AGE 40-64: CPT | Performed by: FAMILY MEDICINE

## 2020-09-02 SDOH — ECONOMIC STABILITY: TRANSPORTATION INSECURITY
IN THE PAST 12 MONTHS, HAS LACK OF TRANSPORTATION KEPT YOU FROM MEETINGS, WORK, OR FROM GETTING THINGS NEEDED FOR DAILY LIVING?: NO

## 2020-09-02 SDOH — ECONOMIC STABILITY: TRANSPORTATION INSECURITY
IN THE PAST 12 MONTHS, HAS THE LACK OF TRANSPORTATION KEPT YOU FROM MEDICAL APPOINTMENTS OR FROM GETTING MEDICATIONS?: NO

## 2020-09-02 NOTE — PROGRESS NOTES
DM2:   Patient is here to fu regarding DM2. Patient is not controlled. Taking all medications and tolerating well. Fasting sugars are running 200. Patient is taking ASA and Ace Inhibitor/ARB. Patient is  on appropriately-dosed statin. LDL is not at goal.  BP is  controlled. No hypoglycemic episodes. Patient does not see Podiatry regularly. Saw an Eye Dr 2019. Patient is aware that it is necessary to see an Eye Dr yearly. Patient does not smoke. Most recent labs reviewed with patient. Patient does not have complaints or concerns today. Lab Results   Component Value Date    LABA1C 11.9 (H) 06/03/2020       Lab Results   Component Value Date    LDLCALC 108 (H) 06/03/2020        Patient's past medical, surgical, social and/or family history reviewed, updated in chart, and are non-contributory (unless otherwise stated). Medications and allergies also reviewed and updated in chart.       Review of Systems:  Constitutional:  No fever, no fatigue, no chills, no headaches, no weight change  Dermatology:  No rash, no mole, no dry or sensitive skin  ENT:  No cough, no sore throat, no sinus pain, no runny nose, no ear pain  Cardiology:  No chest pain, no palpitations, no leg edema, no shortness of breath, no PND  Gastroenterology:  No dysphagia, no abdominal pain, no nausea, no vomiting, no constipation, no diarrhea, no heartburn  Musculoskeletal:  No joint pain, no leg cramps, no back pain, no muscle aches  Respiratory:  No shortness of breath, no orthopnea, no wheezing, no GARCIA, no hemoptysis  Urology:  No blood in the urine, no urinary frequency, no urinary incontinence, no urinary urgency, no nocturia, no dysuria  Vitals:    09/02/20 0716 09/02/20 0722   BP: (!) 131/90 125/79   Pulse: 70    Resp: 16    Temp: 97.9 °F (36.6 °C)    TempSrc: Temporal    SpO2: 96%    Weight: 270 lb 12.8 oz (122.8 kg)    Height: 5' 9\" (1.753 m)        General:  Patient alert and oriented x 3, NAD, pleasant  HEENT:  Atraumatic, normocephalic, PERRLA, EOMI, clear conjunctiva, TMs clear, nose-clear, throat - no erythema  Neck:  Supple, no goiter, no carotid bruits, no lymphadenopathy  Lungs:  CTA B  Heart:  RRR, no murmurs, gallops or rubs  Abdomen:  Soft/nt/nd, + bowel sounds  Extremities:  No clubbing, cyanosis or edema  Skin: unremarkable    Assessment/Plan:      Leon Barrera was seen today for diabetes. Diagnoses and all orders for this visit:    Annual physical exam  -     Comprehensive Metabolic Panel; Future  -     CBC Auto Differential; Future  -     Hemoglobin A1C; Future  -     Lipid Panel; Future    Uncontrolled type 2 diabetes mellitus with hyperglycemia (HCC)  -     Comprehensive Metabolic Panel; Future  -     CBC Auto Differential; Future  -     Hemoglobin A1C; Future  -     Lipid Panel; Future    Essential hypertension    Hyperlipidemia, unspecified hyperlipidemia type    Morbid obesity due to excess calories ([facility]      As above. Call or go to ED immediately if symptoms worsen or persist.  No follow-ups on file. , or sooner if necessary. Educational materials and/or home exercises printed for patient's review and were included in patient instructions on his/her After Visit Summary and given to patient at the end of visit. Counseled regarding above diagnosis, including possible risks and complications,  especially if left uncontrolled. Counseled regarding the possible side effects, risks, benefits and alternatives to treatment; patient and/or guardian verbalizes understanding, agrees, feels comfortable with and wishes to proceed with above treatment plan. Advised patient to call with any new medication issues, and read all Rx info from pharmacy to assure aware of all possible risks and side effects of medication before taking. Reviewed age and gender appropriate health screening exams and vaccinations.   Advised patient regarding importance of keeping up with recommended health maintenance and to schedule as soon as possible if overdue, as this is important in assessing for undiagnosed pathology, especially cancer, as well as protecting against potentially harmful/life threatening disease. Patient and/or guardian verbalizes understanding and agrees with above counseling, assessment and plan. All questions answered. Linn King MD  9/2/2020    I have personally reviewed and updated the chief complaint, HPI, Past Medical, Family and Social History, as well as the above Review of Systems.

## 2020-09-03 RX ORDER — DAPAGLIFLOZIN 10 MG/1
TABLET, FILM COATED ORAL
Qty: 90 TABLET | Refills: 3 | Status: SHIPPED
Start: 2020-09-03 | End: 2021-08-19

## 2020-11-24 RX ORDER — ESCITALOPRAM OXALATE 10 MG/1
TABLET ORAL
Qty: 90 TABLET | Refills: 1 | Status: SHIPPED
Start: 2020-11-24 | End: 2021-05-17

## 2020-12-10 RX ORDER — OMEGA-3-ACID ETHYL ESTERS 1 G/1
CAPSULE, LIQUID FILLED ORAL
Qty: 360 CAPSULE | Refills: 3 | Status: SHIPPED | OUTPATIENT
Start: 2020-12-10

## 2020-12-19 ENCOUNTER — HOSPITAL ENCOUNTER (EMERGENCY)
Age: 45
Discharge: HOME | End: 2020-12-19
Payer: COMMERCIAL

## 2020-12-19 VITALS
RESPIRATION RATE: 18 BRPM | BODY MASS INDEX: 42.09 KG/M2 | SYSTOLIC BLOOD PRESSURE: 148 MMHG | WEIGHT: 285 LBS | HEART RATE: 120 BPM | OXYGEN SATURATION: 97 % | TEMPERATURE: 99.5 F | DIASTOLIC BLOOD PRESSURE: 98 MMHG

## 2020-12-19 PROCEDURE — 99212 OFFICE O/P EST SF 10 MIN: CPT

## 2020-12-19 RX ORDER — CLINDAMYCIN HYDROCHLORIDE 300 MG/1
300 CAPSULE ORAL 3 TIMES DAILY
Qty: 30 CAPSULE | Refills: 0 | Status: SHIPPED | OUTPATIENT
Start: 2020-12-19 | End: 2020-12-29

## 2020-12-19 RX ORDER — IBUPROFEN 600 MG/1
600 TABLET ORAL EVERY 6 HOURS PRN
Qty: 20 TABLET | Refills: 0 | Status: ON HOLD | OUTPATIENT
Start: 2020-12-19 | End: 2022-06-01 | Stop reason: HOSPADM

## 2020-12-19 RX ORDER — HYDROCODONE BITARTRATE AND ACETAMINOPHEN 5; 325 MG/1; MG/1
1 TABLET ORAL EVERY 6 HOURS PRN
Qty: 12 TABLET | Refills: 0 | Status: SHIPPED | OUTPATIENT
Start: 2020-12-19 | End: 2020-12-22

## 2020-12-19 ASSESSMENT — PAIN DESCRIPTION - DESCRIPTORS: DESCRIPTORS: THROBBING

## 2020-12-19 ASSESSMENT — PAIN DESCRIPTION - PAIN TYPE: TYPE: ACUTE PAIN

## 2020-12-19 ASSESSMENT — PAIN DESCRIPTION - LOCATION: LOCATION: BACK

## 2020-12-19 ASSESSMENT — PAIN SCALES - GENERAL: PAINLEVEL_OUTOF10: 9

## 2020-12-20 NOTE — ED PROVIDER NOTES
1913 12/19/20 1913 -- 12/19/20 1913   (!) 148/98 99.5 °F (37.5 °C) Infrared 120 18 97 %  285 lb (129.3 kg)       General:  NAD. Alert and Oriented. Well-appearing. Skin:  Warm, dry. Noted skin lesions to upper extremities and diffusely to his back. Some depressed with crusting. Head:  Normocephalic. Atraumatic. Eyes:  EOMI. Conjunctiva normal.  ENT:  Oral mucosa moist.  Airway patent. Neck:  Supple. Normal ROM. Respiratory:  No respiratory distress. No labored breathing. Lungs clear without rales, rhonchi or wheezing. Cardiovascular:  Regular rate. No peripheral edema. Extremities warm and good color. Extremities:  Normal ROM. Nontender to palpation. Atraumatic. Back: To patient's upper right back he has an 8 cm x 6 cm area of swelling and induration. Abscess is firm. There is no area of fluctuance. There is no pointing. There is no cluster of pustules to this abscess. While it is decently big, I do not see an area for drainage. It is hard. Neuro:  Alert and Oriented to person, place, time and situation. Normal LOC. Moves all extremities. Speech fluent. Psych:  Calm and Cooperative. Normal thought process. Normal judgement. Lab / Imaging Results   (All laboratory and radiology results have been personally reviewed by myself)  Labs:  No results found for this visit on 12/19/20. Imaging: All Radiology results interpreted by Radiologist unless otherwise noted. No orders to display       ED Course / Medical Decision Making   Medications - No data to display     Re-examination:  12/19/20       Time:   Patients condition . Consult(s):   None    Procedure(s):   none    MDM:      Plan is for treatment with analgesics, ATB's and appropriate outpatient follow-up. Patient is urged to take all ATB to completion unless and adverse reaction develops. I did discuss with patient that this is a large abscess, however it is hard at this time.   There is no soft squishy area that

## 2021-05-17 RX ORDER — ROSUVASTATIN CALCIUM 10 MG/1
TABLET, COATED ORAL
Qty: 90 TABLET | Refills: 3 | Status: SHIPPED
Start: 2021-05-17 | End: 2022-04-21 | Stop reason: SDUPTHER

## 2021-05-17 RX ORDER — ESCITALOPRAM OXALATE 10 MG/1
TABLET ORAL
Qty: 90 TABLET | Refills: 1 | Status: SHIPPED
Start: 2021-05-17 | End: 2021-11-12

## 2021-05-17 RX ORDER — ALLOPURINOL 300 MG/1
TABLET ORAL
Qty: 90 TABLET | Refills: 3 | Status: ON HOLD
Start: 2021-05-17 | End: 2022-06-01 | Stop reason: HOSPADM

## 2021-05-17 RX ORDER — LISINOPRIL 20 MG/1
TABLET ORAL
Qty: 90 TABLET | Refills: 3 | Status: SHIPPED
Start: 2021-05-17 | End: 2022-05-23

## 2021-06-29 RX ORDER — SITAGLIPTIN AND METFORMIN HYDROCHLORIDE 1000; 50 MG/1; MG/1
TABLET, FILM COATED ORAL
Qty: 180 TABLET | Refills: 3 | Status: ON HOLD
Start: 2021-06-29 | End: 2022-06-01 | Stop reason: HOSPADM

## 2021-08-19 RX ORDER — DAPAGLIFLOZIN 10 MG/1
TABLET, FILM COATED ORAL
Qty: 90 TABLET | Refills: 3 | Status: SHIPPED
Start: 2021-08-19 | End: 2022-09-12

## 2021-11-12 RX ORDER — ESCITALOPRAM OXALATE 10 MG/1
TABLET ORAL
Qty: 90 TABLET | Refills: 1 | Status: SHIPPED
Start: 2021-11-12 | End: 2022-05-23

## 2022-04-19 ENCOUNTER — OFFICE VISIT (OUTPATIENT)
Dept: FAMILY MEDICINE CLINIC | Age: 47
End: 2022-04-19
Payer: COMMERCIAL

## 2022-04-19 VITALS
TEMPERATURE: 98.1 F | SYSTOLIC BLOOD PRESSURE: 131 MMHG | RESPIRATION RATE: 17 BRPM | OXYGEN SATURATION: 94 % | WEIGHT: 261.7 LBS | HEART RATE: 85 BPM | HEIGHT: 69 IN | BODY MASS INDEX: 38.76 KG/M2 | DIASTOLIC BLOOD PRESSURE: 83 MMHG

## 2022-04-19 DIAGNOSIS — E11.65 UNCONTROLLED TYPE 2 DIABETES MELLITUS WITH HYPERGLYCEMIA (HCC): Primary | ICD-10-CM

## 2022-04-19 DIAGNOSIS — I10 ESSENTIAL HYPERTENSION: ICD-10-CM

## 2022-04-19 DIAGNOSIS — Z80.42 FAMILY HISTORY OF PROSTATE CANCER IN FATHER: ICD-10-CM

## 2022-04-19 DIAGNOSIS — E11.65 UNCONTROLLED TYPE 2 DIABETES MELLITUS WITH HYPERGLYCEMIA (HCC): ICD-10-CM

## 2022-04-19 DIAGNOSIS — E78.5 HYPERLIPIDEMIA, UNSPECIFIED HYPERLIPIDEMIA TYPE: ICD-10-CM

## 2022-04-19 DIAGNOSIS — Z00.00 ANNUAL PHYSICAL EXAM: ICD-10-CM

## 2022-04-19 LAB
ALBUMIN SERPL-MCNC: 4.7 G/DL (ref 3.5–5.2)
ALP BLD-CCNC: 66 U/L (ref 40–129)
ALT SERPL-CCNC: 19 U/L (ref 0–40)
ANION GAP SERPL CALCULATED.3IONS-SCNC: 22 MMOL/L (ref 7–16)
AST SERPL-CCNC: 19 U/L (ref 0–39)
BILIRUB SERPL-MCNC: 0.3 MG/DL (ref 0–1.2)
BUN BLDV-MCNC: 12 MG/DL (ref 6–20)
CALCIUM SERPL-MCNC: 10 MG/DL (ref 8.6–10.2)
CHLORIDE BLD-SCNC: 101 MMOL/L (ref 98–107)
CHOLESTEROL, TOTAL: 287 MG/DL (ref 0–199)
CO2: 19 MMOL/L (ref 22–29)
CREAT SERPL-MCNC: 0.6 MG/DL (ref 0.7–1.2)
CREATININE URINE POCT: NORMAL
GFR AFRICAN AMERICAN: >60
GFR NON-AFRICAN AMERICAN: >60 ML/MIN/1.73
GLUCOSE BLD-MCNC: 288 MG/DL (ref 74–99)
HBA1C MFR BLD: 11.4 % (ref 4–5.6)
HCT VFR BLD CALC: 53.8 % (ref 37–54)
HDLC SERPL-MCNC: 47 MG/DL
HEMOGLOBIN: 17.1 G/DL (ref 12.5–16.5)
LDL CHOLESTEROL CALCULATED: 162 MG/DL (ref 0–99)
MCH RBC QN AUTO: 28.8 PG (ref 26–35)
MCHC RBC AUTO-ENTMCNC: 31.8 % (ref 32–34.5)
MCV RBC AUTO: 90.7 FL (ref 80–99.9)
MICROALBUMIN/CREAT 24H UR: NORMAL MG/G{CREAT}
MICROALBUMIN/CREAT UR-RTO: NORMAL
PDW BLD-RTO: 13.4 FL (ref 11.5–15)
PLATELET # BLD: 326 E9/L (ref 130–450)
PMV BLD AUTO: 11 FL (ref 7–12)
POTASSIUM SERPL-SCNC: 4.7 MMOL/L (ref 3.5–5)
PROSTATE SPECIFIC ANTIGEN: 1.28 NG/ML (ref 0–4)
RBC # BLD: 5.93 E12/L (ref 3.8–5.8)
SODIUM BLD-SCNC: 142 MMOL/L (ref 132–146)
TOTAL PROTEIN: 7.6 G/DL (ref 6.4–8.3)
TRIGL SERPL-MCNC: 392 MG/DL (ref 0–149)
TSH SERPL DL<=0.05 MIU/L-ACNC: 3.86 UIU/ML (ref 0.27–4.2)
VLDLC SERPL CALC-MCNC: 78 MG/DL
WBC # BLD: 7 E9/L (ref 4.5–11.5)

## 2022-04-19 PROCEDURE — 82044 UR ALBUMIN SEMIQUANTITATIVE: CPT | Performed by: FAMILY MEDICINE

## 2022-04-19 PROCEDURE — 99396 PREV VISIT EST AGE 40-64: CPT | Performed by: FAMILY MEDICINE

## 2022-04-19 SDOH — ECONOMIC STABILITY: FOOD INSECURITY: WITHIN THE PAST 12 MONTHS, YOU WORRIED THAT YOUR FOOD WOULD RUN OUT BEFORE YOU GOT MONEY TO BUY MORE.: NEVER TRUE

## 2022-04-19 SDOH — ECONOMIC STABILITY: FOOD INSECURITY: WITHIN THE PAST 12 MONTHS, THE FOOD YOU BOUGHT JUST DIDN'T LAST AND YOU DIDN'T HAVE MONEY TO GET MORE.: NEVER TRUE

## 2022-04-19 ASSESSMENT — PATIENT HEALTH QUESTIONNAIRE - PHQ9
SUM OF ALL RESPONSES TO PHQ QUESTIONS 1-9: 0
1. LITTLE INTEREST OR PLEASURE IN DOING THINGS: 0
SUM OF ALL RESPONSES TO PHQ QUESTIONS 1-9: 0
SUM OF ALL RESPONSES TO PHQ QUESTIONS 1-9: 0
2. FEELING DOWN, DEPRESSED OR HOPELESS: 0
SUM OF ALL RESPONSES TO PHQ QUESTIONS 1-9: 0
SUM OF ALL RESPONSES TO PHQ9 QUESTIONS 1 & 2: 0

## 2022-04-19 ASSESSMENT — SOCIAL DETERMINANTS OF HEALTH (SDOH): HOW HARD IS IT FOR YOU TO PAY FOR THE VERY BASICS LIKE FOOD, HOUSING, MEDICAL CARE, AND HEATING?: NOT HARD AT ALL

## 2022-04-19 NOTE — PROGRESS NOTES
Annual exam:  Patient is here for routine yearly physical/preventative visit. Patient has no complaints or concerns today. Patient is  generally healthy. Chronic medical conditions are generally controlled. Most recent labs reviewed with patient and  are remarkable. Health maintenance reviewed with patient and is not up to date. Overall doing well. Past Medical History:   Diagnosis Date    Hyperlipidemia     Hypertension     Kidney stones     Multiple times    Type II or unspecified type diabetes mellitus without mention of complication, not stated as uncontrolled       Past Surgical History:   Procedure Laterality Date    FRACTURE SURGERY  1992    Lt leg & ankle fx    TONSILLECTOMY  2000      Family History   Problem Relation Age of Onset    Diabetes Mother     High Blood Pressure Mother     Cancer Father         prostate    Heart Disease Father         MI @ 52yrs old/ also 1 stent     Social History     Socioeconomic History    Marital status:      Spouse name: Not on file    Number of children: Not on file    Years of education: Not on file    Highest education level: Not on file   Occupational History    Not on file   Tobacco Use    Smoking status: Never Smoker    Smokeless tobacco: Never Used   Vaping Use    Vaping Use: Never used   Substance and Sexual Activity    Alcohol use: No    Drug use: No    Sexual activity: Not on file   Other Topics Concern    Not on file   Social History Narrative    Not on file     Social Determinants of Health     Financial Resource Strain: Low Risk     Difficulty of Paying Living Expenses: Not hard at all   Food Insecurity: No Food Insecurity    Worried About Running Out of Food in the Last Year: Never true    Hortencia of Food in the Last Year: Never true   Transportation Needs:     Lack of Transportation (Medical): Not on file    Lack of Transportation (Non-Medical):  Not on file   Physical Activity:     Days of Exercise per Week: depression, no sleep disturbances, no suicidal ideation, no anxiety  Physical Exam:  Vitals:    04/19/22 0823   BP: 131/83   Pulse: 85   Resp: 17   Temp: 98.1 °F (36.7 °C)   TempSrc: Temporal   SpO2: 94%   Weight: 261 lb 11.2 oz (118.7 kg)   Height: 5' 9\" (1.753 m)     General:  Patient alert and oriented x 3, NAD, pleasant  HEENT:  Atraumatic, normocephalic, PERRLA, EOMI, clear conjunctiva, TMs clear, nose-clear, throat - no erythema, tonsils- wnl  Neck:  Supple, no goiter, no carotid bruits, no lymphadenopathy  Lungs:  CTA B  Heart:  RRR, no murmurs, gallops or rubs  Abdomen:  Soft, NTND, + bowel sounds  Back: full ROM, no CVA tenderness  Extremities:  No clubbing, cyanosis or edema  Neuro:  CN II-XII grossly intact, 5/5 strength in bilateral upper and lower extremities, 2 + reflexes. Skin: unremarkable    Assessment/Plan:  Renée Sunshine was seen today for annual exam.    Diagnoses and all orders for this visit:    Uncontrolled type 2 diabetes mellitus with hyperglycemia ([facility] [address]  -     Comprehensive Metabolic Panel; Future  -     CBC; Future  -     Hemoglobin A1C; Future  -     Lipid Panel; Future  -     TSH; Future    Family history of prostate cancer in father  -     PSA Screening; Future    Annual physical exam    Essential hypertension    Hyperlipidemia, unspecified hyperlipidemia type      As above. Call or go to ED immediately if symptoms worsen or persist.  No follow-ups on file. or sooner if necessary. Educational materials and/or home exercises printed for patient's review and were included in patient instructions on his/her After Visit Summary and given to patient at the end of visit. Counseled regarding above diagnosis, including possible risks and complications,  especially if left uncontrolled.     Counseled regarding the possible side effects, risks, benefits and alternatives to treatment; patient and/or guardian verbalizes understanding, agrees, feels comfortable with and wishes to proceed with above treatment plan. Advised patient to call with any new medication issues, and read all Rx info from pharmacy to assure aware of all possible risks and side effects of medication before taking. Reviewed age and gender appropriate health screening exams and vaccinations. Advised patient regarding importance of keeping up with recommended health maintenance and to schedule as soon as possible if overdue, as this is important in assessing for undiagnosed pathology, especially cancer, as well as protecting against potentially harmful/life threatening disease. Patient and/or guardian verbalizes understanding and agrees with above counseling, assessment and plan. All questions answered. Venkata Albright MD  4/19/2022    I have personally reviewed and updated the chief complaint, HPI, Past Medical, Family and Social History, as well as the above Review of Systems.

## 2022-04-21 RX ORDER — ROSUVASTATIN CALCIUM 20 MG/1
TABLET, COATED ORAL
Qty: 30 TABLET | Refills: 5 | Status: SHIPPED
Start: 2022-04-21 | End: 2022-05-16

## 2022-05-16 RX ORDER — ROSUVASTATIN CALCIUM 20 MG/1
TABLET, COATED ORAL
Qty: 30 TABLET | Refills: 5 | Status: SHIPPED | OUTPATIENT
Start: 2022-05-16

## 2022-05-23 RX ORDER — ROSUVASTATIN CALCIUM 10 MG/1
TABLET, COATED ORAL
Qty: 90 TABLET | Refills: 3 | Status: ON HOLD
Start: 2022-05-23 | End: 2022-06-01 | Stop reason: HOSPADM

## 2022-05-23 RX ORDER — ESCITALOPRAM OXALATE 10 MG/1
TABLET ORAL
Qty: 90 TABLET | Refills: 1 | Status: SHIPPED | OUTPATIENT
Start: 2022-05-23

## 2022-05-23 RX ORDER — LISINOPRIL 20 MG/1
TABLET ORAL
Qty: 90 TABLET | Refills: 3 | Status: SHIPPED | OUTPATIENT
Start: 2022-05-23

## 2022-05-27 ENCOUNTER — APPOINTMENT (OUTPATIENT)
Dept: CT IMAGING | Age: 47
DRG: 853 | End: 2022-05-27
Payer: COMMERCIAL

## 2022-05-27 ENCOUNTER — OFFICE VISIT (OUTPATIENT)
Dept: FAMILY MEDICINE CLINIC | Age: 47
End: 2022-05-27
Payer: COMMERCIAL

## 2022-05-27 ENCOUNTER — HOSPITAL ENCOUNTER (INPATIENT)
Age: 47
LOS: 5 days | Discharge: HOME HEALTH | DRG: 853 | End: 2022-06-01
Attending: EMERGENCY MEDICINE | Admitting: INTERNAL MEDICINE
Payer: COMMERCIAL

## 2022-05-27 ENCOUNTER — OFFICE VISIT (OUTPATIENT)
Dept: SURGERY | Age: 47
End: 2022-05-27
Payer: COMMERCIAL

## 2022-05-27 VITALS
WEIGHT: 249.6 LBS | HEIGHT: 69 IN | SYSTOLIC BLOOD PRESSURE: 147 MMHG | TEMPERATURE: 100.9 F | HEART RATE: 121 BPM | BODY MASS INDEX: 36.97 KG/M2 | OXYGEN SATURATION: 96 % | RESPIRATION RATE: 18 BRPM | DIASTOLIC BLOOD PRESSURE: 91 MMHG

## 2022-05-27 VITALS
WEIGHT: 248 LBS | OXYGEN SATURATION: 96 % | TEMPERATURE: 98.1 F | RESPIRATION RATE: 20 BRPM | HEART RATE: 124 BPM | HEIGHT: 69 IN | SYSTOLIC BLOOD PRESSURE: 112 MMHG | BODY MASS INDEX: 36.73 KG/M2 | DIASTOLIC BLOOD PRESSURE: 71 MMHG

## 2022-05-27 DIAGNOSIS — L02.212 ABSCESS OF BACK: Primary | ICD-10-CM

## 2022-05-27 DIAGNOSIS — L02.212 ABSCESS OF BACK: ICD-10-CM

## 2022-05-27 DIAGNOSIS — L03.319 CELLULITIS AND ABSCESS OF TRUNK: Primary | ICD-10-CM

## 2022-05-27 DIAGNOSIS — E87.8 HYPOCHLOREMIA: ICD-10-CM

## 2022-05-27 DIAGNOSIS — L02.212 BACK ABSCESS: Primary | ICD-10-CM

## 2022-05-27 DIAGNOSIS — L02.91 ABSCESS: ICD-10-CM

## 2022-05-27 DIAGNOSIS — E11.10 DIABETIC KETOACIDOSIS WITHOUT COMA ASSOCIATED WITH TYPE 2 DIABETES MELLITUS (HCC): ICD-10-CM

## 2022-05-27 DIAGNOSIS — L02.219 CELLULITIS AND ABSCESS OF TRUNK: Primary | ICD-10-CM

## 2022-05-27 DIAGNOSIS — E87.1 HYPONATREMIA: ICD-10-CM

## 2022-05-27 PROBLEM — L02.414 ABSCESS OF LEFT SHOULDER: Status: ACTIVE | Noted: 2022-05-27

## 2022-05-27 LAB
ALBUMIN SERPL-MCNC: 3.8 G/DL (ref 3.5–5.2)
ALP BLD-CCNC: 154 U/L (ref 40–129)
ALT SERPL-CCNC: 14 U/L (ref 0–40)
ANION GAP SERPL CALCULATED.3IONS-SCNC: 24 MMOL/L (ref 7–16)
AST SERPL-CCNC: 10 U/L (ref 0–39)
BASOPHILS ABSOLUTE: 0.06 E9/L (ref 0–0.2)
BASOPHILS RELATIVE PERCENT: 0.3 % (ref 0–2)
BETA-HYDROXYBUTYRATE: 4.19 MMOL/L (ref 0.02–0.27)
BILIRUB SERPL-MCNC: 0.7 MG/DL (ref 0–1.2)
BUN BLDV-MCNC: 14 MG/DL (ref 6–20)
C-REACTIVE PROTEIN: 43.9 MG/DL (ref 0–0.4)
CALCIUM SERPL-MCNC: 9.2 MG/DL (ref 8.6–10.2)
CHLORIDE BLD-SCNC: 86 MMOL/L (ref 98–107)
CO2: 17 MMOL/L (ref 22–29)
CREAT SERPL-MCNC: 0.7 MG/DL (ref 0.7–1.2)
EKG ATRIAL RATE: 116 BPM
EKG P AXIS: 49 DEGREES
EKG P-R INTERVAL: 140 MS
EKG Q-T INTERVAL: 324 MS
EKG QRS DURATION: 92 MS
EKG QTC CALCULATION (BAZETT): 450 MS
EKG R AXIS: 44 DEGREES
EKG T AXIS: 59 DEGREES
EKG VENTRICULAR RATE: 116 BPM
EOSINOPHILS ABSOLUTE: 0.02 E9/L (ref 0.05–0.5)
EOSINOPHILS RELATIVE PERCENT: 0.1 % (ref 0–6)
GFR AFRICAN AMERICAN: >60
GFR NON-AFRICAN AMERICAN: >60 ML/MIN/1.73
GLUCOSE BLD-MCNC: 488 MG/DL (ref 74–99)
HBA1C MFR BLD: 12.7 % (ref 4–5.6)
HCT VFR BLD CALC: 46.7 % (ref 37–54)
HEMOGLOBIN: 15.4 G/DL (ref 12.5–16.5)
IMMATURE GRANULOCYTES #: 0.14 E9/L
IMMATURE GRANULOCYTES %: 0.7 % (ref 0–5)
LACTIC ACID, SEPSIS: 2.1 MMOL/L (ref 0.5–1.9)
LACTIC ACID: 1.5 MMOL/L (ref 0.5–2.2)
LYMPHOCYTES ABSOLUTE: 1.24 E9/L (ref 1.5–4)
LYMPHOCYTES RELATIVE PERCENT: 6.5 % (ref 20–42)
MCH RBC QN AUTO: 28.7 PG (ref 26–35)
MCHC RBC AUTO-ENTMCNC: 33 % (ref 32–34.5)
MCV RBC AUTO: 87.1 FL (ref 80–99.9)
METER GLUCOSE: 357 MG/DL (ref 74–99)
METER GLUCOSE: 389 MG/DL (ref 74–99)
MONOCYTES ABSOLUTE: 2.13 E9/L (ref 0.1–0.95)
MONOCYTES RELATIVE PERCENT: 11.2 % (ref 2–12)
NEUTROPHILS ABSOLUTE: 15.49 E9/L (ref 1.8–7.3)
NEUTROPHILS RELATIVE PERCENT: 81.2 % (ref 43–80)
PDW BLD-RTO: 12.6 FL (ref 11.5–15)
PH VENOUS: 7.41 (ref 7.35–7.45)
PLATELET # BLD: 303 E9/L (ref 130–450)
PMV BLD AUTO: 10.9 FL (ref 7–12)
POTASSIUM REFLEX MAGNESIUM: 4.4 MMOL/L (ref 3.5–5)
RBC # BLD: 5.36 E12/L (ref 3.8–5.8)
SEDIMENTATION RATE, ERYTHROCYTE: 51 MM/HR (ref 0–15)
SODIUM BLD-SCNC: 127 MMOL/L (ref 132–146)
TOTAL PROTEIN: 7.8 G/DL (ref 6.4–8.3)
WBC # BLD: 19.1 E9/L (ref 4.5–11.5)

## 2022-05-27 PROCEDURE — G8427 DOCREV CUR MEDS BY ELIG CLIN: HCPCS | Performed by: SURGERY

## 2022-05-27 PROCEDURE — 99244 OFF/OP CNSLTJ NEW/EST MOD 40: CPT | Performed by: SURGERY

## 2022-05-27 PROCEDURE — 86140 C-REACTIVE PROTEIN: CPT

## 2022-05-27 PROCEDURE — 2580000003 HC RX 258: Performed by: STUDENT IN AN ORGANIZED HEALTH CARE EDUCATION/TRAINING PROGRAM

## 2022-05-27 PROCEDURE — 85651 RBC SED RATE NONAUTOMATED: CPT

## 2022-05-27 PROCEDURE — 96374 THER/PROPH/DIAG INJ IV PUSH: CPT

## 2022-05-27 PROCEDURE — 93005 ELECTROCARDIOGRAM TRACING: CPT | Performed by: STUDENT IN AN ORGANIZED HEALTH CARE EDUCATION/TRAINING PROGRAM

## 2022-05-27 PROCEDURE — 6360000002 HC RX W HCPCS: Performed by: STUDENT IN AN ORGANIZED HEALTH CARE EDUCATION/TRAINING PROGRAM

## 2022-05-27 PROCEDURE — 82962 GLUCOSE BLOOD TEST: CPT

## 2022-05-27 PROCEDURE — 2500000003 HC RX 250 WO HCPCS: Performed by: SPECIALIST

## 2022-05-27 PROCEDURE — 6370000000 HC RX 637 (ALT 250 FOR IP): Performed by: SPECIALIST

## 2022-05-27 PROCEDURE — 6360000002 HC RX W HCPCS: Performed by: SPECIALIST

## 2022-05-27 PROCEDURE — 99213 OFFICE O/P EST LOW 20 MIN: CPT | Performed by: FAMILY MEDICINE

## 2022-05-27 PROCEDURE — G8417 CALC BMI ABV UP PARAM F/U: HCPCS | Performed by: FAMILY MEDICINE

## 2022-05-27 PROCEDURE — 83605 ASSAY OF LACTIC ACID: CPT

## 2022-05-27 PROCEDURE — 87040 BLOOD CULTURE FOR BACTERIA: CPT

## 2022-05-27 PROCEDURE — 36415 COLL VENOUS BLD VENIPUNCTURE: CPT

## 2022-05-27 PROCEDURE — 6370000000 HC RX 637 (ALT 250 FOR IP): Performed by: STUDENT IN AN ORGANIZED HEALTH CARE EDUCATION/TRAINING PROGRAM

## 2022-05-27 PROCEDURE — 82010 KETONE BODYS QUAN: CPT

## 2022-05-27 PROCEDURE — 85025 COMPLETE CBC W/AUTO DIFF WBC: CPT

## 2022-05-27 PROCEDURE — 6360000002 HC RX W HCPCS: Performed by: NURSE PRACTITIONER

## 2022-05-27 PROCEDURE — 6370000000 HC RX 637 (ALT 250 FOR IP): Performed by: NURSE PRACTITIONER

## 2022-05-27 PROCEDURE — 71260 CT THORAX DX C+: CPT

## 2022-05-27 PROCEDURE — 83036 HEMOGLOBIN GLYCOSYLATED A1C: CPT

## 2022-05-27 PROCEDURE — APPSS45 APP SPLIT SHARED TIME 31-45 MINUTES: Performed by: NURSE PRACTITIONER

## 2022-05-27 PROCEDURE — 99285 EMERGENCY DEPT VISIT HI MDM: CPT

## 2022-05-27 PROCEDURE — 82800 BLOOD PH: CPT

## 2022-05-27 PROCEDURE — G8417 CALC BMI ABV UP PARAM F/U: HCPCS | Performed by: SURGERY

## 2022-05-27 PROCEDURE — 2060000000 HC ICU INTERMEDIATE R&B

## 2022-05-27 PROCEDURE — 6360000004 HC RX CONTRAST MEDICATION: Performed by: RADIOLOGY

## 2022-05-27 PROCEDURE — G8427 DOCREV CUR MEDS BY ELIG CLIN: HCPCS | Performed by: FAMILY MEDICINE

## 2022-05-27 PROCEDURE — 99223 1ST HOSP IP/OBS HIGH 75: CPT | Performed by: STUDENT IN AN ORGANIZED HEALTH CARE EDUCATION/TRAINING PROGRAM

## 2022-05-27 PROCEDURE — 96375 TX/PRO/DX INJ NEW DRUG ADDON: CPT

## 2022-05-27 PROCEDURE — 1036F TOBACCO NON-USER: CPT | Performed by: FAMILY MEDICINE

## 2022-05-27 PROCEDURE — 80053 COMPREHEN METABOLIC PANEL: CPT

## 2022-05-27 PROCEDURE — 2580000003 HC RX 258: Performed by: NURSE PRACTITIONER

## 2022-05-27 RX ORDER — ROSUVASTATIN CALCIUM 20 MG/1
20 TABLET, COATED ORAL NIGHTLY
Status: DISCONTINUED | OUTPATIENT
Start: 2022-05-27 | End: 2022-06-01 | Stop reason: HOSPADM

## 2022-05-27 RX ORDER — ONDANSETRON 2 MG/ML
4 INJECTION INTRAMUSCULAR; INTRAVENOUS EVERY 6 HOURS PRN
Status: DISCONTINUED | OUTPATIENT
Start: 2022-05-27 | End: 2022-05-31 | Stop reason: SDUPTHER

## 2022-05-27 RX ORDER — ACETAMINOPHEN 325 MG/1
650 TABLET ORAL EVERY 6 HOURS PRN
Status: DISCONTINUED | OUTPATIENT
Start: 2022-05-27 | End: 2022-06-01 | Stop reason: HOSPADM

## 2022-05-27 RX ORDER — INSULIN LISPRO 100 [IU]/ML
0-9 INJECTION, SOLUTION INTRAVENOUS; SUBCUTANEOUS NIGHTLY
Status: DISCONTINUED | OUTPATIENT
Start: 2022-05-27 | End: 2022-06-01 | Stop reason: HOSPADM

## 2022-05-27 RX ORDER — MAGNESIUM SULFATE IN WATER 40 MG/ML
2000 INJECTION, SOLUTION INTRAVENOUS PRN
Status: DISCONTINUED | OUTPATIENT
Start: 2022-05-27 | End: 2022-06-01 | Stop reason: HOSPADM

## 2022-05-27 RX ORDER — ONDANSETRON 4 MG/1
4 TABLET, ORALLY DISINTEGRATING ORAL EVERY 8 HOURS PRN
Status: DISCONTINUED | OUTPATIENT
Start: 2022-05-27 | End: 2022-05-31 | Stop reason: SDUPTHER

## 2022-05-27 RX ORDER — SODIUM CHLORIDE 0.9 % (FLUSH) 0.9 %
5-40 SYRINGE (ML) INJECTION PRN
Status: DISCONTINUED | OUTPATIENT
Start: 2022-05-27 | End: 2022-05-31 | Stop reason: SDUPTHER

## 2022-05-27 RX ORDER — POTASSIUM CHLORIDE 20 MEQ/1
40 TABLET, EXTENDED RELEASE ORAL PRN
Status: DISCONTINUED | OUTPATIENT
Start: 2022-05-27 | End: 2022-06-01 | Stop reason: HOSPADM

## 2022-05-27 RX ORDER — ENOXAPARIN SODIUM 100 MG/ML
30 INJECTION SUBCUTANEOUS 2 TIMES DAILY
Status: DISCONTINUED | OUTPATIENT
Start: 2022-05-27 | End: 2022-05-30

## 2022-05-27 RX ORDER — LISINOPRIL 20 MG/1
20 TABLET ORAL DAILY
Status: DISCONTINUED | OUTPATIENT
Start: 2022-05-27 | End: 2022-06-01 | Stop reason: HOSPADM

## 2022-05-27 RX ORDER — LIDOCAINE HYDROCHLORIDE AND EPINEPHRINE 10; 10 MG/ML; UG/ML
20 INJECTION, SOLUTION INFILTRATION; PERINEURAL ONCE
Status: DISCONTINUED | OUTPATIENT
Start: 2022-05-27 | End: 2022-05-27 | Stop reason: HOSPADM

## 2022-05-27 RX ORDER — DEXTROSE MONOHYDRATE 50 MG/ML
100 INJECTION, SOLUTION INTRAVENOUS PRN
Status: DISCONTINUED | OUTPATIENT
Start: 2022-05-27 | End: 2022-06-01 | Stop reason: HOSPADM

## 2022-05-27 RX ORDER — ALLOPURINOL 300 MG/1
300 TABLET ORAL DAILY
Status: DISCONTINUED | OUTPATIENT
Start: 2022-05-27 | End: 2022-06-01 | Stop reason: HOSPADM

## 2022-05-27 RX ORDER — ESCITALOPRAM OXALATE 10 MG/1
5 TABLET ORAL DAILY
Status: DISCONTINUED | OUTPATIENT
Start: 2022-05-28 | End: 2022-05-29

## 2022-05-27 RX ORDER — LINEZOLID 600 MG/1
600 TABLET, FILM COATED ORAL EVERY 12 HOURS SCHEDULED
Status: DISCONTINUED | OUTPATIENT
Start: 2022-05-27 | End: 2022-05-29

## 2022-05-27 RX ORDER — OXYCODONE HYDROCHLORIDE AND ACETAMINOPHEN 5; 325 MG/1; MG/1
1 TABLET ORAL ONCE
Status: COMPLETED | OUTPATIENT
Start: 2022-05-27 | End: 2022-05-27

## 2022-05-27 RX ORDER — ACETAMINOPHEN 650 MG/1
650 SUPPOSITORY RECTAL EVERY 6 HOURS PRN
Status: DISCONTINUED | OUTPATIENT
Start: 2022-05-27 | End: 2022-06-01 | Stop reason: HOSPADM

## 2022-05-27 RX ORDER — OXYCODONE HYDROCHLORIDE 5 MG/1
5 TABLET ORAL EVERY 4 HOURS PRN
Status: DISCONTINUED | OUTPATIENT
Start: 2022-05-27 | End: 2022-05-29

## 2022-05-27 RX ORDER — SODIUM CHLORIDE 9 MG/ML
INJECTION, SOLUTION INTRAVENOUS PRN
Status: DISCONTINUED | OUTPATIENT
Start: 2022-05-27 | End: 2022-05-31 | Stop reason: SDUPTHER

## 2022-05-27 RX ORDER — SODIUM CHLORIDE 9 MG/ML
INJECTION, SOLUTION INTRAVENOUS CONTINUOUS
Status: DISCONTINUED | OUTPATIENT
Start: 2022-05-27 | End: 2022-06-01

## 2022-05-27 RX ORDER — ESCITALOPRAM OXALATE 10 MG/1
10 TABLET ORAL DAILY
Status: DISCONTINUED | OUTPATIENT
Start: 2022-05-27 | End: 2022-05-27

## 2022-05-27 RX ORDER — 0.9 % SODIUM CHLORIDE 0.9 %
30 INTRAVENOUS SOLUTION INTRAVENOUS ONCE
Status: COMPLETED | OUTPATIENT
Start: 2022-05-27 | End: 2022-05-27

## 2022-05-27 RX ORDER — ASPIRIN 81 MG/1
81 TABLET ORAL DAILY
Status: DISCONTINUED | OUTPATIENT
Start: 2022-05-27 | End: 2022-06-01 | Stop reason: HOSPADM

## 2022-05-27 RX ORDER — INSULIN GLARGINE 100 [IU]/ML
100 INJECTION, SOLUTION SUBCUTANEOUS 2 TIMES DAILY
Status: DISCONTINUED | OUTPATIENT
Start: 2022-05-27 | End: 2022-05-28

## 2022-05-27 RX ORDER — INSULIN LISPRO 100 [IU]/ML
10 INJECTION, SOLUTION INTRAVENOUS; SUBCUTANEOUS ONCE
Status: DISCONTINUED | OUTPATIENT
Start: 2022-05-27 | End: 2022-05-27

## 2022-05-27 RX ORDER — INSULIN LISPRO 100 [IU]/ML
0-18 INJECTION, SOLUTION INTRAVENOUS; SUBCUTANEOUS
Status: DISCONTINUED | OUTPATIENT
Start: 2022-05-27 | End: 2022-06-01 | Stop reason: HOSPADM

## 2022-05-27 RX ORDER — SODIUM CHLORIDE 0.9 % (FLUSH) 0.9 %
5-40 SYRINGE (ML) INJECTION EVERY 12 HOURS SCHEDULED
Status: DISCONTINUED | OUTPATIENT
Start: 2022-05-27 | End: 2022-05-31 | Stop reason: SDUPTHER

## 2022-05-27 RX ORDER — POTASSIUM CHLORIDE 7.45 MG/ML
10 INJECTION INTRAVENOUS PRN
Status: DISCONTINUED | OUTPATIENT
Start: 2022-05-27 | End: 2022-06-01 | Stop reason: HOSPADM

## 2022-05-27 RX ORDER — POLYETHYLENE GLYCOL 3350 17 G/17G
17 POWDER, FOR SOLUTION ORAL DAILY PRN
Status: DISCONTINUED | OUTPATIENT
Start: 2022-05-27 | End: 2022-06-01 | Stop reason: HOSPADM

## 2022-05-27 RX ADMIN — ENOXAPARIN SODIUM 30 MG: 100 INJECTION SUBCUTANEOUS at 20:01

## 2022-05-27 RX ADMIN — OXYCODONE AND ACETAMINOPHEN 1 TABLET: 5; 325 TABLET ORAL at 14:36

## 2022-05-27 RX ADMIN — Medication 2000 MG: at 23:37

## 2022-05-27 RX ADMIN — Medication 2000 MG: at 14:43

## 2022-05-27 RX ADMIN — LIDOCAINE HYDROCHLORIDE AND EPINEPHRINE 20 ML: 10; 10 INJECTION, SOLUTION INFILTRATION; PERINEURAL at 13:24

## 2022-05-27 RX ADMIN — SODIUM CHLORIDE, PRESERVATIVE FREE 10 ML: 5 INJECTION INTRAVENOUS at 20:07

## 2022-05-27 RX ADMIN — SODIUM CHLORIDE: 9 INJECTION, SOLUTION INTRAVENOUS at 16:51

## 2022-05-27 RX ADMIN — ACETAMINOPHEN 650 MG: 325 TABLET ORAL at 19:54

## 2022-05-27 RX ADMIN — SODIUM CHLORIDE 3387 ML: 9 INJECTION, SOLUTION INTRAVENOUS at 14:18

## 2022-05-27 RX ADMIN — INSULIN GLARGINE 100 UNITS: 100 INJECTION, SOLUTION SUBCUTANEOUS at 20:02

## 2022-05-27 RX ADMIN — ROSUVASTATIN CALCIUM 20 MG: 20 TABLET, FILM COATED ORAL at 20:01

## 2022-05-27 RX ADMIN — LISINOPRIL 20 MG: 20 TABLET ORAL at 19:56

## 2022-05-27 RX ADMIN — INSULIN LISPRO 7 UNITS: 100 INJECTION, SOLUTION INTRAVENOUS; SUBCUTANEOUS at 20:02

## 2022-05-27 RX ADMIN — IOPAMIDOL 75 ML: 755 INJECTION, SOLUTION INTRAVENOUS at 15:46

## 2022-05-27 RX ADMIN — ALLOPURINOL 300 MG: 300 TABLET ORAL at 19:55

## 2022-05-27 RX ADMIN — INSULIN LISPRO 15 UNITS: 100 INJECTION, SOLUTION INTRAVENOUS; SUBCUTANEOUS at 18:53

## 2022-05-27 RX ADMIN — VANCOMYCIN HYDROCHLORIDE 2500 MG: 5 INJECTION, POWDER, LYOPHILIZED, FOR SOLUTION INTRAVENOUS at 14:48

## 2022-05-27 RX ADMIN — LINEZOLID 600 MG: 600 TABLET, FILM COATED ORAL at 21:41

## 2022-05-27 RX ADMIN — INSULIN HUMAN 10 UNITS: 100 INJECTION, SOLUTION PARENTERAL at 16:49

## 2022-05-27 ASSESSMENT — PAIN DESCRIPTION - LOCATION
LOCATION: BACK
LOCATION: BACK

## 2022-05-27 ASSESSMENT — PAIN DESCRIPTION - ORIENTATION
ORIENTATION: LEFT
ORIENTATION: UPPER

## 2022-05-27 ASSESSMENT — PAIN DESCRIPTION - DESCRIPTORS
DESCRIPTORS: BURNING;ACHING;PRESSURE
DESCRIPTORS: ACHING

## 2022-05-27 ASSESSMENT — ENCOUNTER SYMPTOMS
SHORTNESS OF BREATH: 0
COLOR CHANGE: 0
SORE THROAT: 0
COUGH: 0
TROUBLE SWALLOWING: 0
RHINORRHEA: 0
ABDOMINAL DISTENTION: 0
EYE PAIN: 0
VOMITING: 0
NAUSEA: 0
CONSTIPATION: 0
DIARRHEA: 0
BACK PAIN: 1
WHEEZING: 0
FACIAL SWELLING: 0
COLOR CHANGE: 1
ABDOMINAL PAIN: 0
SINUS PAIN: 0

## 2022-05-27 ASSESSMENT — PAIN SCALES - GENERAL
PAINLEVEL_OUTOF10: 9
PAINLEVEL_OUTOF10: 4

## 2022-05-27 ASSESSMENT — PAIN - FUNCTIONAL ASSESSMENT: PAIN_FUNCTIONAL_ASSESSMENT: 0-10

## 2022-05-27 ASSESSMENT — PAIN DESCRIPTION - PAIN TYPE: TYPE: ACUTE PAIN

## 2022-05-27 NOTE — PROGRESS NOTES
Endocrinology consult sent via secure text message to Dr. Magdiel Johnson. ID consult sent via answering service. Dr. Sarah Beth Nesbitt covering.

## 2022-05-27 NOTE — ED PROVIDER NOTES
ATTENDING PROVIDER ATTESTATION:     Nae Lau presented to the emergency department for evaluation of Abscess (back)   and was initially evaluated by the Medical Resident. See Original ED Note for H&P and ED course above. I have reviewed and discussed the case, including pertinent history (medical, surgical, family and social) and exam findings with the Medical Resident assigned to Nae Sid. I have personally performed and/or participated in the history, exam, medical decision making, and procedures and agree with all pertinent clinical information. I, Dr. Giana Chavira MD, am the primary provider of this record. I have reviewed my findings and recommendations with the assigned Medical Resident, Nae Lau and members of family present at the time of disposition. My findings/plan: The primary encounter diagnosis was Cellulitis and abscess of trunk. Diagnoses of Diabetic ketoacidosis without coma associated with type 2 diabetes mellitus ([facility] [address] Hypochloremia, and Hyponatremia were also pertinent to this visit. New Prescriptions    No medications on file     Giana Chavira MD    [address]      Pt Name: Nae Lau  MRN: [id number]  [place] 1975  Date of evaluation: 5/27/2022      CHIEF COMPLAINT       Chief Complaint   Patient presents with    Abscess     back        HPI  Nae Lau is a 42 y.o. male with past medical history of uncontrolled type 2 diabetes (last A1c 11.4) and history of skin abscesses who was sent in by Dr. Марина Adams for rapidly progressing back abscess. Patient states that his abscess began on his back on Tuesday. Symptoms have been moderate in severity, constant, no exacerbating or alleviating factors. No associated symptoms of fevers. He states he saw urgent care on Wednesday and the abscess was drained.   Patient believes he started an antibiotic yesterday but is unsure of which antibiotic he was given. Patient saw Dr. Lizbeth Lagunas in the office today. She attempted to do an I&D in the office but minimal pus and blood were excreted from the abscess at that time. She then sent inpatient for further evaluation and likely need for IV antibiotics. Patient denies any fever, chills, nausea, vomiting, diarrhea. Patient admits to significant pain on his back that worsens with lying on the abscess and improves when he moves his weight off of the abscess. Except as noted above the remainder of the review of systems was reviewed and negative. Review of Systems   Constitutional: Negative for chills, fatigue and fever. HENT: Negative for congestion, ear pain, rhinorrhea, sinus pain and sore throat. Eyes: Negative for pain and visual disturbance. Respiratory: Negative for cough, shortness of breath and wheezing. Cardiovascular: Negative for chest pain, palpitations and leg swelling. Gastrointestinal: Negative for abdominal pain, constipation, diarrhea, nausea and vomiting. Genitourinary: Negative for dysuria, frequency, hematuria and urgency. Musculoskeletal: Positive for back pain. Negative for arthralgias, gait problem, joint swelling, neck pain and neck stiffness. Skin: Positive for color change and wound. Negative for rash. Back Abscess   Neurological: Negative for dizziness, syncope, light-headedness and numbness. Psychiatric/Behavioral: Negative for confusion, decreased concentration, dysphoric mood and hallucinations. The patient is not nervous/anxious. Physical Exam  Vitals and nursing note reviewed. Constitutional:       Appearance: Normal appearance. HENT:      Right Ear: External ear normal.      Left Ear: External ear normal.      Nose: No congestion or rhinorrhea.       Mouth/Throat:      Mouth: Mucous membranes are moist.      Pharynx: No oropharyngeal exudate or posterior oropharyngeal erythema. Eyes:      Extraocular Movements: Extraocular movements intact. Conjunctiva/sclera: Conjunctivae normal.   Cardiovascular:      Rate and Rhythm: Normal rate and regular rhythm. Heart sounds: Normal heart sounds. No murmur heard. No gallop. Pulmonary:      Effort: Pulmonary effort is normal. No respiratory distress. Breath sounds: Normal breath sounds. No wheezing, rhonchi or rales. Abdominal:      General: Bowel sounds are normal. There is no distension. Palpations: Abdomen is soft. Tenderness: There is no abdominal tenderness. Musculoskeletal:      Cervical back: No tenderness. Right lower leg: No edema. Left lower leg: No edema. Lymphadenopathy:      Cervical: No cervical adenopathy. Skin:     General: Skin is warm. Capillary Refill: Capillary refill takes less than 2 seconds. Findings: Abscess, ecchymosis, erythema and wound present. No rash. Neurological:      Mental Status: He is alert. Deep Tendon Reflexes: Reflexes normal.          Patient presents to the ED for back abscess. Differential diagnoses included but not limited to sepsis secondary to abscess, uncomplicated abscess, cellulitis. Workup in the ED revealed extensive back abscess, hyponatremia, hypochloremia, elevated anion gap, normal pH, elevated beta hydroxybutyrate, leukocytosis, elevated ESR and elevated lactic acid. Patient is afebrile but tachycardic. Wound cultures were taken by Dr. Adilene Mack. Blood cultures are pending. CT chest with contrast is pending as well. Patient was given sepsis fluids, percocet x1, 10 Units insulin, and started on ancef 2g and vancomycin 2,500 mg for their symptoms with mild improvement. Patient requires continued workup and management of their symptoms and will be admitted to the hospital for further evaluation and treatment. ED Course as of 05/27/22 1622   Fri May 27, 2022   1416 EKG: This EKG is signed and interpreted by me. Rate: 116  Rhythm: Sinus  Interpretation: Sinus tachycardia, normal ND interval, normal QRS, normal QT interval, no acute ST or T wave changes  Comparison: no previous EKG available     [JA]   2507 Patient is a 68-year-old male who presents from the general surgeons office due to rapidly enlarging back abscess. He states it first appeared a few days ago. He was seen in urgent care on Tuesday and had an I&D performed. States he started Bactrim yesterday. States that it has been increasing in size and has been increasingly painful. He said no fevers or systemic illness. He followed up with Dr. Coral Slater today with general surgery who attempted further I&D at bedside but was not able to yield any purulent drainage. Sent to the ED for IV antibiotics and medical admission. On examination, he has a large area of induration and erythema to his back with no crepitus and no active drainage. Performed bedside ultrasound with no significant fluid collection seen. CT scan is pending. Plan for admission. Will order IV antibiotics and fluids. [JA]      ED Course User Index  [JA] Zamzam West MD       --------------------------------------------- PAST HISTORY ---------------------------------------------  Past Medical History:  has a past medical history of Hyperlipidemia, Hypertension, Kidney stones, and Type II or unspecified type diabetes mellitus without mention of complication, not stated as uncontrolled. Past Surgical History:  has a past surgical history that includes Tonsillectomy (2000) and fracture surgery (1992). Social History:  reports that he has never smoked. He has never used smokeless tobacco. He reports that he does not drink alcohol and does not use drugs. Family History: family history includes Cancer in his father; Diabetes in his mother; Heart Disease in his father; High Blood Pressure in his mother. The patients home medications have been reviewed.     Allergies: Sulfa antibiotics    -------------------------------------------------- RESULTS -------------------------------------------------    LABS:  Results for orders placed or performed during the hospital encounter of 05/27/22   CBC with Auto Differential   Result Value Ref Range    WBC 19.1 (H) 4.5 - 11.5 E9/L    RBC 5.36 3.80 - 5.80 E12/L    Hemoglobin 15.4 12.5 - 16.5 g/dL    Hematocrit 46.7 37.0 - 54.0 %    MCV 87.1 80.0 - 99.9 fL    MCH 28.7 26.0 - 35.0 pg    MCHC 33.0 32.0 - 34.5 %    RDW 12.6 11.5 - 15.0 fL    Platelets 269 742 - 097 E9/L    MPV 10.9 7.0 - 12.0 fL    Neutrophils % 81.2 (H) 43.0 - 80.0 %    Immature Granulocytes % 0.7 0.0 - 5.0 %    Lymphocytes % 6.5 (L) 20.0 - 42.0 %    Monocytes % 11.2 2.0 - 12.0 %    Eosinophils % 0.1 0.0 - 6.0 %    Basophils % 0.3 0.0 - 2.0 %    Neutrophils Absolute 15.49 (H) 1.80 - 7.30 E9/L    Immature Granulocytes # 0.14 E9/L    Lymphocytes Absolute 1.24 (L) 1.50 - 4.00 E9/L    Monocytes Absolute 2.13 (H) 0.10 - 0.95 E9/L    Eosinophils Absolute 0.02 (L) 0.05 - 0.50 E9/L    Basophils Absolute 0.06 0.00 - 0.20 E9/L   Comprehensive Metabolic Panel w/ Reflex to MG   Result Value Ref Range    Sodium 127 (L) 132 - 146 mmol/L    Potassium reflex Magnesium 4.4 3.5 - 5.0 mmol/L    Chloride 86 (L) 98 - 107 mmol/L    CO2 17 (L) 22 - 29 mmol/L    Anion Gap 24 (H) 7 - 16 mmol/L    Glucose 488 (H) 74 - 99 mg/dL    BUN 14 6 - 20 mg/dL    CREATININE 0.7 0.7 - 1.2 mg/dL    GFR Non-African American >60 >=60 mL/min/1.73    GFR African American >60     Calcium 9.2 8.6 - 10.2 mg/dL    Total Protein 7.8 6.4 - 8.3 g/dL    Albumin 3.8 3.5 - 5.2 g/dL    Total Bilirubin 0.7 0.0 - 1.2 mg/dL    Alkaline Phosphatase 154 (H) 40 - 129 U/L    ALT 14 0 - 40 U/L    AST 10 0 - 39 U/L   Lactate, Sepsis   Result Value Ref Range    Lactic Acid, Sepsis 2.1 (H) 0.5 - 1.9 mmol/L   Sedimentation Rate   Result Value Ref Range    Sed Rate 51 (H) 0 - 15 mm/Hr   Beta-Hydroxybutyrate   Result Value Ref Range Beta-Hydroxybutyrate 4.19 (H) 0.02 - 0.27 mmol/L   PH, VENOUS   Result Value Ref Range    pH, Manjinder 7.41 7.35 - 7.45   EKG 12 lead   Result Value Ref Range    Ventricular Rate 116 BPM    Atrial Rate 116 BPM    P-R Interval 140 ms    QRS Duration 92 ms    Q-T Interval 324 ms    QTc Calculation (Bazett) 450 ms    P Axis 49 degrees    R Axis 44 degrees    T Axis 59 degrees       RADIOLOGY:  CT CHEST W CONTRAST   Final Result   1. Findings consistent with extensive cellulitis involving the subcutaneous   tissues in the left upper back. No drainable abscess seen. 2. No evidence of acute intrathoracic process. 3. 2 mm nodule in the superior aspect of the right middle lobe. If the   patient is low risk, no routine follow-up is indicated. Please see below for   Fleischner society follow-up guidelines. RECOMMENDATIONS:   Fleischner Society guidelines for follow-up and management of incidentally   detected pulmonary nodules:      Single Solid Nodule:      Nodule size less than 6 mm   In a low-risk patient, no routine follow-up. In a high-risk patient, optional CT at 12 months. Nodule size equals 6-8 mm   In a low-risk patient, CT at 6-12 months, then consider CT at 18-24 months. In a high-risk patient, CT at 6-12 months, then CT at 18-24 months. Nodule size greater than 8 mm         In a low-risk patient, consider CT at 3 months, PET/CT, or tissue sampling. In a high-risk patient, consider CT at 3 months, PET/CT, or tissue sampling. Multiple Solid Nodules:      Nodule size less than 6 mm   In a low-risk patient, no routine follow-up. In a high-risk patient, optional CT at 12 months. Nodule size equals 6-8 mm   In a low-risk patient, CT at 3-6 months, then consider CT at 18-24 months. In a high-risk patient, CT at 3-6 months, then CT at 18-24 months. Nodule size greater than 8 mm   In a low-risk patient, CT at 3-6 months, then consider CT at 18-24 months.    In a high-risk patient, CT at 3-6 months, then CT at 18-24 months. - Low risk patients include individuals with minimal or absent history of   smoking and other known risk factors. - High risk patients include individuals with a history or smoking or known   risk factors. Radiology 2017 http://pubs. rsna.org/doi/full/10.1148/radiol. 9808145214             EKG:  This EKG is signed and interpreted by me. Rate: 116  Rhythm: Sinus  Interpretation: sinus tachycardia  Comparison: changes compared to previous EKG and tachycardic      ------------------------- NURSING NOTES AND VITALS REVIEWED ---------------------------  Date / Time Roomed:  5/27/2022  1:10 PM  ED Bed Assignment:  15/15    The nursing notes within the ED encounter and vital signs as below have been reviewed. Patient Vitals for the past 24 hrs:   BP Temp Temp src Pulse Resp SpO2 Height Weight   05/27/22 1302 (!) 173/91 97.6 °F (36.4 °C) Temporal (!) 126 14 94 % 5' 9\" (1.753 m) 249 lb (112.9 kg)       Oxygen Saturation Interpretation: Normal    ------------------------------------------ PROGRESS NOTES ------------------------------------------  Re-evaluation(s):  Time: 1500  Patients symptoms show no change  Repeat physical examination is not changed    Counseling:  I have spoken with the patient and discussed todays results, in addition to providing specific details for the plan of care and counseling regarding the diagnosis and prognosis. Their questions are answered at this time and they are agreeable with the plan of admission.    --------------------------------- ADDITIONAL PROVIDER NOTES ---------------------------------  Consultations:  Time: . Spoke with Dr. Ernst. Discussed case. They will admit the patient.   This patient's ED course included: a personal history and physicial examination, re-evaluation prior to disposition, IV medications, cardiac monitoring and continuous pulse oximetry    This patient has remained hemodynamically stable during their ED course. Diagnosis:  1. Cellulitis and abscess of trunk    2. Diabetic ketoacidosis without coma associated with type 2 diabetes mellitus ([address]    3. Hypochloremia    4. Hyponatremia        Disposition:  Patient's disposition: Admit to med/surg floor  Patient's condition is stable.            Ravindra López DO  Resident  05/27/22 0114       Lawyer Amna MD  05/27/22 0313

## 2022-05-27 NOTE — PROGRESS NOTES
General Surgery  History and Physical and Procedure for Incision and Drainage of an Abscess     Patient's Name/Date of Birth: Alessio Hines / 1975    Date: 5/27/2022    PCP: Melisa Cruz MD    Referring Physician:   No ref. provider found  N/A      CHIEF COMPLAINT:    Chief Complaint   Patient presents with    Procedure     abscess I & D on back         HISTORY OF PRESENT ILLNESS:    Mukesh Del Cid is a 42y.o. year old male  who presents for evaluation of a cutaneous abscess. Lesion is located in the back. Onset was 4 days ago. Symptoms have gradually worsened. Abscess has associated symptoms of pain. Patient does have previous history of cutaneous abscesses. Patient does have diabetes. I discussed \"incision and drainage of abscess of the abscess of the back \" with him, including the procedure, benefits, risks, and alternatives, and he agreed.      Past Medical History:   Past Medical History:   Diagnosis Date    Hyperlipidemia     Hypertension     Kidney stones     Multiple times    Type II or unspecified type diabetes mellitus without mention of complication, not stated as uncontrolled         Past Surgical History:   Past Surgical History:   Procedure Laterality Date    FRACTURE SURGERY  1992    Lt leg & ankle fx    TONSILLECTOMY  2000        Allergies: Sulfa antibiotics     Medications:   Current Outpatient Medications   Medication Sig Dispense Refill    lisinopril (PRINIVIL;ZESTRIL) 20 MG tablet TAKE 1 TABLET BY MOUTH EVERY DAY 90 tablet 3    escitalopram (LEXAPRO) 10 MG tablet TAKE 1 TABLET BY MOUTH EVERY DAY 90 tablet 1    rosuvastatin (CRESTOR) 10 MG tablet TAKE 1 TABLET BY MOUTH EVERYDAY AT BEDTIME 90 tablet 3    rosuvastatin (CRESTOR) 20 MG tablet TAKE 1 TABLET BY MOUTH EVERYDAY AT BEDTIME 30 tablet 5    Semaglutide 3 MG TABS Take 3 mg by mouth daily Start 7 mg when this Rx is complete 30 tablet 0    Semaglutide 7 MG TABS Take 7 mg by mouth daily Begin this Rx when 3 mg Rx is complete 30 tablet 1    FARXIGA 10 MG tablet TAKE 1 TABLET BY MOUTH EVERY DAY IN THE MORNING 90 tablet 3    sitaGLIPtan-metFORMIN (JANUMET)  MG per tablet TAKE 1 TABLET BY MOUTH TWICE A  tablet 3    allopurinol (ZYLOPRIM) 300 MG tablet TAKE 1 TABLET BY MOUTH EVERY DAY 90 tablet 3    omega-3 acid ethyl esters (LOVAZA) 1 g capsule TAKE 2 CAPSULES BY MOUTH TWICE A  capsule 3    allopurinol (ZYLOPRIM) 300 MG tablet TAKE 1 TABLET BY MOUTH EVERY DAY 90 tablet 3    insulin aspart (NOVOLOG FLEXPEN) 100 UNIT/ML injection pen Up to 45 units QAC 30 pen 3    insulin detemir (LEVEMIR FLEXTOUCH) 100 UNIT/ML injection pen Inject 100 Units into the skin 2 times daily 60 pen 3    NOVOLOG FLEXPEN 100 UNIT/ML injection pen SCALE < 150--15 UNITS 151-200--20 UNITS 201-250--25 UNITS 251-300--30 UNITS 301-350--35 UNITS 3 pen 3    Glucose Blood (BLOOD GLUCOSE TEST STRIPS) STRP Test four times a day 360 strip 3    BD ULTRA-FINE PEN NEEDLES 29G X 12.7MM MISC USE 5 TIMES DAILY OR AS DIRECTED 500 each 3    aspirin 81 MG tablet Take 81 mg by mouth daily.  Blood Glucose Monitoring Suppl (ONE TOUCH II TEST STRIP RESENDEZ) by Does not apply route.  ibuprofen (IBU) 600 MG tablet Take 1 tablet by mouth every 6 hours as needed for Pain 20 tablet 0     No current facility-administered medications for this visit.          Social History:   Social History     Tobacco Use    Smoking status: Never Smoker    Smokeless tobacco: Never Used   Substance Use Topics    Alcohol use: No        Family History:   Family History   Problem Relation Age of Onset    Diabetes Mother     High Blood Pressure Mother     Cancer Father         prostate    Heart Disease Father         MI @ 52yrs old/ also 1 stent       REVIEW OF SYSTEMS:    Constitutional: negative  Eyes: negative  Ears, nose, mouth, throat, and face: negative  Respiratory: negative  Cardiovascular: negative  Gastrointestinal: negative  Genitourinary:negative  Integument/breast: as in HPI  Hematologic/lymphatic: negative  Musculoskeletal:negative  Neurological: negative  Allergic/Immunologic: negative    PHYSICAL EXAM   /71   Pulse (!) 124   Temp 98.1 °F (36.7 °C) (Infrared)   Resp 20   Ht 5' 9\" (1.753 m)   Wt 248 lb (112.5 kg)   SpO2 96%   BMI 36.62 kg/m²     General appearance: alert, cooperative and in no acute distress. Eyes: Grossly normal   Lungs: Normal work of breathing  Heart: regular rate  Abdomen: soft, non-tender, non distended, no masses or organomegaly   Neurologic: Alert and oriented x 3. Grossly normal  Musculoskeletal: No edema. Integument: area of cellulitis and induration on the upper back     Office Procedure Note    Laurie Common     DATE OF PROCEDURE: 5/27/2022  SURGEON: Dr. Tano Loving MD, M.D. PREOPERATIVE DIAGNOSIS: Abscess of the upper back     POSTOPERATIVE DIAGNOSIS: Same    PROCEDURE: Incision and drainage of Abscess of the  Upper back    ANESTHESIA: 1% Lidocaine for local anesthesia. COMPLICATIONS: None. SPECIMEN: Aerobic and anaerobic C&S of the abscess drainage. PROCEDURE: The upper back over the abscess was prepped with Chloraprep and draped in a sterile fashion. 1% Lidocaine was infiltrated into the skin and subcutaneous tissue over the abscess and then a Crutiate incision was made for the entire diameter of the abscess cavity. A minimal amount of purulent material was expressed from the wound and sent for aerobic and anaerobic cultures. All loculations were broken with a hemostat and the wound was copiously irrigated with saline. The wound was then packed with 1/4 inch Nu-gauze packing up to the skin level. It was then covered with a dry sterile dressing. The patient tolerated the procedure well.      ASSESSMENT and PLAN:   42y.o. year old male with abscess of  Upper back    I am sending the patient to the ER given the degree of pain and cellulitis and the failure of home treatment. At the very least, he needs IV antibiotics and imaging to ensure there is no deeper abscess cavity. He may need operative debridement if he worsens. Anna Tsai MD 5/27/2022 12:45 PM     Send copy of H&P to PCP, Yuri Zendejas MD and referring physician, No ref.  provider found

## 2022-05-27 NOTE — H&P
[facility]   History and Physical      CHIEF COMPLAINT:  Left shoulder wound    History of Present Illness:  42 y.o. male who was sent to the ER per general surgery for evaluation of abscess left upper shoulder that began about 4 days ago. Was seen at urgent care 05/26/22 where it was lanced. Went to see PCP today and was sent to general surgery. She attempted I&D and could find sac and sent patient to ER. Denies fever, chills, N/V/D. [facility] has had abscesses in the past, this one has been the worst. [facility] abscess could have been a pimple. Patient reports history of acne since he was a child.  has an appointment Tuesday with dermatology. Patient states blood sugars are normally controlled between 120-140 but hasn't taken insulin the last 2 days because he hasn't felt good. Reports anorexia past 2 days. Informant(s) for H&P: patient         REVIEW OF SYSTEMS:  Denies CP, SOB subjective fever/chills, cough, congestion, n/v/d, ha, vision/hearing changes, wt changes, hot/cold flashes, other open skin lesions, constipation, dysuria/hematuria unless noted in HPI. Complete ROS performed with the patient and is otherwise negative. PMH:  Past Medical History:   Diagnosis Date    Hyperlipidemia     Hypertension     Kidney stones     Multiple times    Type II or unspecified type diabetes mellitus without mention of complication, not stated as uncontrolled        Surgical History:  Past Surgical History:   Procedure Laterality Date    FRACTURE SURGERY  1992    Lt leg & ankle fx    TONSILLECTOMY  2000       Medications Prior to Admission:    Prior to Admission medications    Medication Sig Start Date End Date Taking?  Authorizing Provider   lisinopril (PRINIVIL;ZESTRIL) 20 MG tablet TAKE 1 TABLET BY MOUTH EVERY DAY 5/23/22   Cleo Washington MD   escitalopram (LEXAPRO) 10 MG tablet TAKE 1 TABLET BY MOUTH EVERY DAY 5/23/22   Cleo Washington MD   rosuvastatin (CRESTOR) 10 MG tablet TAKE 1 TABLET BY MOUTH EVERYDAY AT BEDTIME 5/23/22   Melisa Cruz MD   rosuvastatin (CRESTOR) 20 MG tablet TAKE 1 TABLET BY MOUTH EVERYDAY AT BEDTIME 5/16/22   Melisa Cruz MD   Semaglutide 3 MG TABS Take 3 mg by mouth daily Start 7 mg when this Rx is complete 4/21/22   Melisa Cruz MD   Semaglutide 7 MG TABS Take 7 mg by mouth daily Begin this Rx when 3 mg Rx is complete 4/21/22   Melisa Cruz MD   FARXIGA 10 MG tablet TAKE 1 TABLET BY MOUTH EVERY DAY IN THE MORNING 8/19/21   Melisa Cruz MD   sitaGLIPtan-metFORMIN (JANUMET)  MG per tablet TAKE 1 TABLET BY MOUTH TWICE A DAY 6/29/21   Melisa Cruz MD   allopurinol (ZYLOPRIM) 300 MG tablet TAKE 1 TABLET BY MOUTH EVERY DAY 5/17/21   Melisa Cruz MD   ibuprofen (IBU) 600 MG tablet Take 1 tablet by mouth every 6 hours as needed for Pain 12/19/20 12/24/20  Claudina Osler, PA   omega-3 acid ethyl esters (LOVAZA) 1 g capsule TAKE 2 CAPSULES BY MOUTH TWICE A DAY 12/10/20   Melisa Cruz MD   allopurinol (ZYLOPRIM) 300 MG tablet TAKE 1 TABLET BY MOUTH EVERY DAY 3/10/20   Melisa Cruz MD   insulin aspart (NOVOLOG FLEXPEN) 100 UNIT/ML injection pen Up to 45 units QAC 11/6/19   Melisa Cruz MD   insulin detemir (LEVEMIR FLEXTOUCH) 100 UNIT/ML injection pen Inject 100 Units into the skin 2 times daily 7/29/19   Melisa Cruz MD   NOVOLOG FLEXPEN 100 UNIT/ML injection pen SCALE < 150--15 UNITS 151-200--20 UNITS 201-250--25 UNITS 251-300--30 UNITS 301-350--35 UNITS 1/17/18   Melisa Cruz MD   Glucose Blood (BLOOD GLUCOSE TEST STRIPS) STRP Test four times a day 1/6/16   Melisa Cruz MD   BD ULTRA-FINE PEN NEEDLES 29G X 12.7MM MISC USE 5 TIMES DAILY OR AS DIRECTED 9/2/14   Melisa Cruz MD   aspirin 81 MG tablet Take 81 mg by mouth daily. Historical Provider, MD   Blood Glucose Monitoring Suppl (ONE TOUCH II TEST STRIP RESENDEZ) by Does not apply route. Historical Provider, MD       Allergies:    Sulfa antibiotics    Social History:    reports that he has never smoked. He has never used smokeless tobacco. He reports that he does not drink alcohol and does not use drugs. Family History:   family history includes Cancer in his father; Diabetes in his mother; Heart Disease in his father; High Blood Pressure in his mother. PHYSICAL EXAM:  Vitals:  BP (!) 173/91   Pulse (!) 126   Temp 97.6 °F (36.4 °C) (Temporal)   Resp 14   Ht 5' 9\" (1.753 m)   Wt 249 lb (112.9 kg)   SpO2 94%   BMI 36.77 kg/m²     General Appearance: alert and oriented to person, place and time and in no acute distress  Skin: warm and dry, multiple skin lesions, abscess left shoulder  Head: normocephalic and atraumatic  Eyes: pupils equal, round, and reactive to light, extraocular eye movements intact, conjunctivae normal  Neck: neck supple and non tender without mass   Pulmonary/Chest: clear to auscultation bilaterally- no wheezes, rales or rhonchi, normal air movement, no respiratory distress  Cardiovascular: normal rate, normal S1 and S2 and no carotid bruits  Abdomen: soft, non-tender, non-distended, normal bowel sounds, no masses or organomegaly  Extremities: no cyanosis, no clubbing and no edema  Neurologic: no cranial nerve deficit and speech normal              LABS:  Recent Labs     05/27/22  1353   *   K 4.4   CL 86*   CO2 17*   BUN 14   CREATININE 0.7   GLUCOSE 488*   CALCIUM 9.2       Recent Labs     05/27/22  1353   WBC 19.1*   RBC 5.36   HGB 15.4   HCT 46.7   MCV 87.1   MCH 28.7   MCHC 33.0   RDW 12.6      MPV 10.9       No results for input(s): POCGLU in the last 72 hours. I reviewed all labs and diagnostic images        Radiology: No results found. EKG:         ASSESSMENT:      Principal Problem:    Back abscess  Resolved Problems:    * No resolved hospital problems. *      PLAN:    1.  Sepsis:  -leukocytosis, tachycardia, lactic

## 2022-05-27 NOTE — PROGRESS NOTES
Admission database completed to best of this RN's ability. Pharmacy tech to review medication list. Care plan and education initiated. Pt independent from home with wife. Has glucometer and appropriate diabetic supplies. Denies any [place] [address] services prior to admission.

## 2022-05-27 NOTE — PROGRESS NOTES
Chief Complaint   Patient presents with    Follow-up       HPI:  Annie Maldonado presents to the office for ER follow up. Patient was seen in the ER for a duran on his back which was lanced. Since being discharged from the ER Codey's  symptoms have been pain and swelling. .   Any prescribed medications have not been finished. Discharge instructions and any medication changes were again reviewed. Patient's past medical, surgical, social and/or family history reviewed, updated in chart, and are non-contributory (unless otherwise stated). Medications and allergies also reviewed and updated in chart.       Review of Systems:  Constitutional:  No fever, no fatigue, no chills, no headaches, no weight change  Dermatology:  No rash, no mole, no dry or sensitive skin  ENT:  No cough, no sore throat, no sinus pain, no runny nose, no ear pain  Cardiology:  No chest pain, no palpitations, no leg edema, no shortness of breath, no PND  Gastroenterology:  No dysphagia, no abdominal pain, no nausea, no vomiting, no constipation, no diarrhea, no heartburn  Musculoskeletal:  No joint pain, no leg cramps, no back pain, no muscle aches  Respiratory:  No shortness of breath, no orthopnea, no wheezing, no GARCIA, no hemoptysis  Urology:  No blood in the urine, no urinary frequency, no urinary incontinence, no urinary urgency, no nocturia, no dysuria  Vitals:    05/27/22 1127 05/27/22 1128   BP: (!) 133/96 (!) 147/91   Pulse: (!) 121    Resp: 18    Temp: (!) 100.9 °F (38.3 °C)    TempSrc: Temporal    SpO2: 96%    Weight: 249 lb 9.6 oz (113.2 kg)    Height: 5' 9\" (1.753 m)        General:  Patient alert and oriented x 3, NAD, pleasant  HEENT:  Atraumatic, normocephalic, PERRLA, EOMI, clear conjunctiva, TMs clear, nose-clear, throat - no erythema  Neck:  Supple, no goiter, no carotid bruits, no lymphadenopathy  Lungs:  CTA B  Heart:  RRR, no murmurs, gallops or rubs  Abdomen:  Soft/nt/nd, + bowel sounds  Extremities:  No clubbing, cyanosis or edema  Skin: L upper back with  7 cm abscess     Assessment/Plan:      Philip Yanez was seen today for follow-up. Diagnoses and all orders for this visit:    Back abscess    referred to Dr. Naye Melvin who saw him today, did I&D and sent to ER for IV abx. Pt was admitted. As above. Call or go to ED immediately if symptoms worsen or persist.  No follow-ups on file. , or sooner if necessary. Educational materials and/or home exercises printed for patient's review and were included in patient instructions on his/her After Visit Summary and given to patient at the end of visit. Counseled regarding above diagnosis, including possible risks and complications,  especially if left uncontrolled. Counseled regarding the possible side effects, risks, benefits and alternatives to treatment; patient and/or guardian verbalizes understanding, agrees, feels comfortable with and wishes to proceed with above treatment plan. Advised patient to call with any new medication issues, and read all Rx info from pharmacy to assure aware of all possible risks and side effects of medication before taking. Reviewed age and gender appropriate health screening exams and vaccinations. Advised patient regarding importance of keeping up with recommended health maintenance and to schedule as soon as possible if overdue, as this is important in assessing for undiagnosed pathology, especially cancer, as well as protecting against potentially harmful/life threatening disease. Patient and/or guardian verbalizes understanding and agrees with above counseling, assessment and plan. All questions answered. Nika Flores MD  6/3/2022    I have personally reviewed and updated the chief complaint, HPI, Past Medical, Family and Social History, as well as the above Review of Systems.

## 2022-05-27 NOTE — CONSULTS
[address] [place] [address] [facility]    Consultation Note     Admit Date: 5/27/2022  1:10 PM    Reason for Consult: Carbuncle    Attending Physician:  Kiran Rosenberg MD     Chief Complaint: Abscess on back    HISTORY OF PRESENT ILLNESS:   The patient is a 42 y.o. man not known to the Infectious Diseases service. The patient is admitted through the emergency room after he failed outpatient treatment for a carbuncle on his back. He was I&D done on Wednesday and apparently antimicrobials were started but patient had delay in obtaining and taking them. He was seen today by surgery and admitted because of the fact the patient is a brittle diabetic. Patient has a history of MSSA back abscesses in the past.  Patient denies any fevers chills nausea vomiting diarrhea. Patient had temperature of 100.6. Labs showed that the BUN and creatinine was 14 and 0.7 white count was 19,000 hemoglobin 15.     Past Medical History:        Diagnosis Date    Hyperlipidemia     Hypertension     Kidney stones     Multiple times    Type II or unspecified type diabetes mellitus without mention of complication, not stated as uncontrolled      Past Surgical History:        Procedure Laterality Date    FRACTURE SURGERY  1992    Lt leg & ankle fx    TONSILLECTOMY  2000     Current Medications:   Scheduled Meds:   allopurinol  300 mg Oral Daily    aspirin  81 mg Oral Daily    escitalopram  10 mg Oral Daily    insulin lispro  0-18 Units SubCUTAneous TID WC    insulin lispro  0-9 Units SubCUTAneous Nightly    insulin glargine  100 Units SubCUTAneous BID    lisinopril  20 mg Oral Daily    rosuvastatin  20 mg Oral Nightly    sodium chloride flush  5-40 mL IntraVENous 2 times per day    enoxaparin  30 mg SubCUTAneous BID     Continuous Infusions:   sodium chloride 200 mL/hr at 05/27/22 1651    dextrose      sodium chloride       PRN Meds:glucose, dextrose bolus **OR** dextrose bolus, glucagon (rDNA), dextrose, sodium chloride flush, sodium chloride, ondansetron **OR** ondansetron, polyethylene glycol, acetaminophen **OR** acetaminophen, magnesium sulfate, potassium chloride **OR** potassium alternative oral replacement **OR** potassium chloride    Allergies:  Sulfa antibiotics    Social History:   Social History     Socioeconomic History    Marital status:      Spouse name: None    Number of children: None    Years of education: None    Highest education level: None   Occupational History    None   Tobacco Use    Smoking status: Never Smoker    Smokeless tobacco: Never Used   Vaping Use    Vaping Use: Never used   Substance and Sexual Activity    Alcohol use: No     Comment: soc    Drug use: No    Sexual activity: None   Other Topics Concern    None   Social History Narrative    None     Social Determinants of Health     Financial Resource Strain: Low Risk     Difficulty of Paying Living Expenses: Not hard at all   Food Insecurity: No Food Insecurity    Worried About Running Out of Food in the Last Year: Never true    Hortencia of Food in the Last Year: Never true   Transportation Needs:     Lack of Transportation (Medical): Not on file    Lack of Transportation (Non-Medical):  Not on file   Physical Activity:     Days of Exercise per Week: Not on file    Minutes of Exercise per Session: Not on file   Stress:     Feeling of Stress : Not on file   Social Connections:     Frequency of Communication with Friends and Family: Not on file    Frequency of Social Gatherings with Friends and Family: Not on file    Attends Rastafarian Services: Not on file    Active Member of Clubs or Organizations: Not on file    Attends Club or Organization Meetings: Not on file    Marital Status: Not on file   Intimate Partner Violence:     Fear of Current or Ex-Partner: Not on file    Emotionally Abused: Not on file    Physically Abused: Not on file    Sexually Abused: Not on file   Housing Stability:     rhonchi. Cardiovascular: S1 and S2 are rhythmic and regular. No murmurs appreciated. Abdomen: Positive bowel sounds to auscultation. Benign to palpation. No masses felt. No hepatosplenomegaly. Genitourinary: Male  Extremities: No clubbing, no cyanosis, no edema. Musculoskeletal: Equal and symmetrical  Neurological: No focal  Lines: peripheral      CBC+dif:  Recent Labs     05/27/22  1353   WBC 19.1*   HGB 15.4   HCT 46.7   MCV 87.1      NEUTROABS 15.49*     Lab Results   Component Value Date    CRP 43.9 (H) 05/27/2022     No results found for: [facility]  Lab Results   Component Value Date    SEDRATE 51 (H) 05/27/2022     Lab Results   Component Value Date    ALT 14 05/27/2022    AST 10 05/27/2022    ALKPHOS 154 (H) 05/27/2022    BILITOT 0.7 05/27/2022     Lab Results   Component Value Date     05/27/2022    K 4.4 05/27/2022    CL 86 05/27/2022    CO2 17 05/27/2022    BUN 14 05/27/2022    CREATININE 0.7 05/27/2022    GFRAA >60 05/27/2022    LABGLOM >60 05/27/2022    GLUCOSE 488 05/27/2022    PROT 7.8 05/27/2022    LABALBU 3.8 05/27/2022    CALCIUM 9.2 05/27/2022    BILITOT 0.7 05/27/2022    ALKPHOS 154 05/27/2022    AST 10 05/27/2022    ALT 14 05/27/2022       No results found for: PROTIME, INR    Lab Results   Component Value Date    TSH 3.860 04/19/2022       Lab Results   Component Value Date    COLORU YELLOW 12/01/2013    PHUR 5.0 12/01/2013    LABCAST RARE 08/28/2013    WBCUA NONE 12/01/2013    RBCUA NONE 12/01/2013    BACTERIA NONE 12/01/2013    CLARITYU CLEAR 12/01/2013    SPECGRAV >=1.030 12/01/2013    LEUKOCYTESUR NEGATIVE 12/01/2013    UROBILINOGEN 0.2 12/01/2013    BILIRUBINUR NEGATIVE 12/01/2013    BLOODU NEGATIVE 12/01/2013    GLUCOSEU 500 12/01/2013       No results found for: [place], BEART, A3DZBRUG, PHART, THGBART, KPC6AZV, PO2ART, SUL6DKR  Radiology:  CT CHEST W CONTRAST   Final Result   1.  Findings consistent with extensive cellulitis involving the subcutaneous   tissues in the left upper back. No drainable abscess seen. 2. No evidence of acute intrathoracic process. 3. 2 mm nodule in the superior aspect of the right middle lobe. If the   patient is low risk, no routine follow-up is indicated. Please see below for   Fleischner society follow-up guidelines. RECOMMENDATIONS:   Fleischner Society guidelines for follow-up and management of incidentally   detected pulmonary nodules:      Single Solid Nodule:      Nodule size less than 6 mm   In a low-risk patient, no routine follow-up. In a high-risk patient, optional CT at 12 months. Nodule size equals 6-8 mm   In a low-risk patient, CT at 6-12 months, then consider CT at 18-24 months. In a high-risk patient, CT at 6-12 months, then CT at 18-24 months. Nodule size greater than 8 mm         In a low-risk patient, consider CT at 3 months, PET/CT, or tissue sampling. In a high-risk patient, consider CT at 3 months, PET/CT, or tissue sampling. Multiple Solid Nodules:      Nodule size less than 6 mm   In a low-risk patient, no routine follow-up. In a high-risk patient, optional CT at 12 months. Nodule size equals 6-8 mm   In a low-risk patient, CT at 3-6 months, then consider CT at 18-24 months. In a high-risk patient, CT at 3-6 months, then CT at 18-24 months. Nodule size greater than 8 mm   In a low-risk patient, CT at 3-6 months, then consider CT at 18-24 months. In a high-risk patient, CT at 3-6 months, then CT at 18-24 months. - Low risk patients include individuals with minimal or absent history of   smoking and other known risk factors. - High risk patients include individuals with a history or smoking or known   risk factors. Radiology 2017 http://pubs. rsna.org/doi/full/10.1148/radiol. 5539441962             Microbiology:  Pending  No results for input(s): BC in the last 72 hours. No results for input(s): ORG in the last 72 hours.   No results for input(s): Darius Pandey in the last 72 hours. No results for input(s): STREPNEUMAGU in the last 72 hours. No results for input(s): LP1UAG in the last 72 hours. No results for input(s): ASO in the last 72 hours. No results for input(s): CULTRESP in the last 72 hours. Assessment:  · Recurrent furuncles and carbuncle-back  · Cellulitis of the back  · Historically has been MSSA  · Hyperglycemia associated with the above  · Leukocytosis associated with the above    Plan:    · Cont cefazolin plus linezolid with adjustments for Lexapro  · Historically patient's MSSA blood cultures are pending at this time  · Strong hygiene educational program given to the patient  · Check cultures  · Baseline ESR, CRP  · Monitor labs  · Will follow with you    Thank you for having us see this patient in consultation. I will be discussing this case with the treating physicians.       Electronically signed by Vernell Baxter MD on 5/27/2022 at 7:35 PM

## 2022-05-27 NOTE — CONSULTS
GENERAL SURGERY  CONSULT NOTE  5/27/2022    Physician Consulted: Dr. Elisabeth Noel   Reason for Consult: Back abscess  Referring Physician: Dr. Manju Mcpherson    JEROD Smith is a 42 y.o. male who presents to the general surgery service for evaluation of diabetic soft tissue infection of the back. The patient first noticed this lesion on the left side of his upper back 3 days ago, on Tuesday, 4/24. He states that this lesion has been tender, and causing him increasing amounts of pain. Yesterday, he presented to an urgent care, where an I&D procedure was performed. He saw his PCP today, who referred him to Dr. Elisabeth Noel. Dr. Segundo Sauceda's clinic, further drainage was attempted to be performed, however no further material was able to be expressed. Medical history significant for diabetes mellitus. Last known A1c 11.4. The patient denies tobacco use. He has had previous cutaneous abscesses. He denies fevers, chills, or other systemic symptoms. The patient states that he has taken 1 day of Bactrim prior to today. Past Medical History:   Diagnosis Date    Hyperlipidemia     Hypertension     Kidney stones     Multiple times    Type II or unspecified type diabetes mellitus without mention of complication, not stated as uncontrolled        Past Surgical History:   Procedure Laterality Date    FRACTURE SURGERY  1992    Lt leg & ankle fx    TONSILLECTOMY  2000       Medications Prior to Admission:    Prior to Admission medications    Medication Sig Start Date End Date Taking?  Authorizing Provider   lisinopril (PRINIVIL;ZESTRIL) 20 MG tablet TAKE 1 TABLET BY MOUTH EVERY DAY 5/23/22   Asael Landrum MD   escitalopram (LEXAPRO) 10 MG tablet TAKE 1 TABLET BY MOUTH EVERY DAY 5/23/22   Asael Landrum MD   rosuvastatin (CRESTOR) 10 MG tablet TAKE 1 TABLET BY MOUTH EVERYDAY AT BEDTIME 5/23/22   Asael Landrum MD   rosuvastatin (CRESTOR) 20 MG tablet TAKE 1 TABLET BY MOUTH EVERYDAY AT BEDTIME 5/16/22   Nima Pinzon MD   Semaglutide 3 MG TABS Take 3 mg by mouth daily Start 7 mg when this Rx is complete 4/21/22   Nima Pinzon MD   Semaglutide 7 MG TABS Take 7 mg by mouth daily Begin this Rx when 3 mg Rx is complete 4/21/22   Nima Pinzon MD   FARXIGA 10 MG tablet TAKE 1 TABLET BY MOUTH EVERY DAY IN THE MORNING 8/19/21   Nima Pinzon MD   sitaGLIPtan-metFORMIN (JANUMET)  MG per tablet TAKE 1 TABLET BY MOUTH TWICE A DAY 6/29/21   Nima Pinzon MD   allopurinol (ZYLOPRIM) 300 MG tablet TAKE 1 TABLET BY MOUTH EVERY DAY 5/17/21   Nima Pinzon MD   ibuprofen (IBU) 600 MG tablet Take 1 tablet by mouth every 6 hours as needed for Pain 12/19/20 12/24/20  GREGORIO Esparza   omega-3 acid ethyl esters (LOVAZA) 1 g capsule TAKE 2 CAPSULES BY MOUTH TWICE A DAY 12/10/20   Nima Pinzon MD   allopurinol (ZYLOPRIM) 300 MG tablet TAKE 1 TABLET BY MOUTH EVERY DAY 3/10/20   Nima Pinzon MD   insulin aspart (NOVOLOG FLEXPEN) 100 UNIT/ML injection pen Up to 45 units QAC 11/6/19   Nima Pinzon MD   insulin detemir (LEVEMIR FLEXTOUCH) 100 UNIT/ML injection pen Inject 100 Units into the skin 2 times daily 7/29/19   Nima Pinzon MD   NOVOLOG FLEXPEN 100 UNIT/ML injection pen SCALE < 150--15 UNITS 151-200--20 UNITS 201-250--25 UNITS 251-300--30 UNITS 301-350--35 UNITS 1/17/18   Nima Pinzon MD   Glucose Blood (BLOOD GLUCOSE TEST STRIPS) STRP Test four times a day 1/6/16   Nima Pinzon MD   BD ULTRA-FINE PEN NEEDLES 29G X 12.7MM MISC USE 5 TIMES DAILY OR AS DIRECTED 9/2/14   Nima Pinzon MD   aspirin 81 MG tablet Take 81 mg by mouth daily. Historical Provider, MD   Blood Glucose Monitoring Suppl (ONE TOUCH II TEST STRIP RESENDEZ) by Does not apply route.       Historical Provider, MD       Allergies   Allergen Reactions    Sulfa Antibiotics Hives       Family History   Problem Relation Age of Onset    Diabetes Mother     High Blood Pressure Mother     Cancer Father         prostate    Heart Disease Father         MI @ 52yrs old/ also 1 stent       Social History     Tobacco Use    Smoking status: Never Smoker    Smokeless tobacco: Never Used   Vaping Use    Vaping Use: Never used   Substance Use Topics    Alcohol use: No    Drug use: No         Review of Systems   Constitutional: Negative for appetite change, chills, fatigue and fever. HENT: Negative for facial swelling and trouble swallowing. Respiratory: Negative for cough and shortness of breath. Cardiovascular: Negative for chest pain and palpitations. Gastrointestinal: Negative for abdominal distention, abdominal pain, constipation, diarrhea, nausea and vomiting. Endocrine: Positive for polyphagia. Negative for polydipsia. Genitourinary: Negative for difficulty urinating and dysuria. Skin: Positive for rash and wound. Negative for color change. Neurological: Negative for dizziness and weakness. Psychiatric/Behavioral: Negative for agitation, behavioral problems and confusion. PHYSICAL EXAM:    Vitals:    05/27/22 1302   BP: (!) 173/91   Pulse: (!) 126   Resp: 14   Temp: 97.6 °F (36.4 °C)   SpO2: 94%       General Appearance:  awake, alert, oriented, in no acute distress  Skin: Indurated, and tender area on the left side of the upper back. No drainage or fluctuance. See picture below  Head/face:  NCAT  Eyes:  No gross abnormalities. Sclera nonicteric  Lungs/Chest:  Normal expansion. No respiratory distress. On room air. No chest wall tenderness  Heart: Warm throughout. Tachycardic  Abdomen:  Soft, non tender, non distended.    Extremities: Extremities warm to touch, pink                LABS:    CBC  Recent Labs     05/27/22  1353   WBC 19.1*   HGB 15.4   HCT 46.7        BMP  Recent Labs     05/27/22  1353   *   K 4.4   CL 86*   CO2 17*   BUN 14   CREATININE 0.7   CALCIUM 9.2     Liver Function  Recent Labs

## 2022-05-28 ENCOUNTER — ANESTHESIA EVENT (OUTPATIENT)
Dept: OPERATING ROOM | Age: 47
DRG: 853 | End: 2022-05-28
Payer: COMMERCIAL

## 2022-05-28 PROBLEM — L03.312 CELLULITIS OF BACK: Status: ACTIVE | Noted: 2022-05-28

## 2022-05-28 PROBLEM — R91.1 PULMONARY NODULE: Status: ACTIVE | Noted: 2022-05-28

## 2022-05-28 LAB
ANION GAP SERPL CALCULATED.3IONS-SCNC: 15 MMOL/L (ref 7–16)
BASOPHILS ABSOLUTE: 0.05 E9/L (ref 0–0.2)
BASOPHILS RELATIVE PERCENT: 0.3 % (ref 0–2)
BUN BLDV-MCNC: 11 MG/DL (ref 6–20)
CALCIUM SERPL-MCNC: 8.2 MG/DL (ref 8.6–10.2)
CHLORIDE BLD-SCNC: 93 MMOL/L (ref 98–107)
CO2: 22 MMOL/L (ref 22–29)
CREAT SERPL-MCNC: 0.6 MG/DL (ref 0.7–1.2)
EOSINOPHILS ABSOLUTE: 0.11 E9/L (ref 0.05–0.5)
EOSINOPHILS RELATIVE PERCENT: 0.6 % (ref 0–6)
GFR AFRICAN AMERICAN: >60
GFR NON-AFRICAN AMERICAN: >60 ML/MIN/1.73
GLUCOSE BLD-MCNC: 284 MG/DL (ref 74–99)
HCT VFR BLD CALC: 41 % (ref 37–54)
HEMOGLOBIN: 13.4 G/DL (ref 12.5–16.5)
IMMATURE GRANULOCYTES #: 0.13 E9/L
IMMATURE GRANULOCYTES %: 0.7 % (ref 0–5)
LACTIC ACID: 1.2 MMOL/L (ref 0.5–2.2)
LYMPHOCYTES ABSOLUTE: 1.41 E9/L (ref 1.5–4)
LYMPHOCYTES RELATIVE PERCENT: 7.1 % (ref 20–42)
MCH RBC QN AUTO: 28.9 PG (ref 26–35)
MCHC RBC AUTO-ENTMCNC: 32.7 % (ref 32–34.5)
MCV RBC AUTO: 88.4 FL (ref 80–99.9)
METER GLUCOSE: 218 MG/DL (ref 74–99)
METER GLUCOSE: 221 MG/DL (ref 74–99)
METER GLUCOSE: 247 MG/DL (ref 74–99)
METER GLUCOSE: 274 MG/DL (ref 74–99)
MONOCYTES ABSOLUTE: 2.33 E9/L (ref 0.1–0.95)
MONOCYTES RELATIVE PERCENT: 11.7 % (ref 2–12)
NEUTROPHILS ABSOLUTE: 15.91 E9/L (ref 1.8–7.3)
NEUTROPHILS RELATIVE PERCENT: 79.6 % (ref 43–80)
PDW BLD-RTO: 12.9 FL (ref 11.5–15)
PLATELET # BLD: 280 E9/L (ref 130–450)
PMV BLD AUTO: 10.7 FL (ref 7–12)
POTASSIUM REFLEX MAGNESIUM: 4.2 MMOL/L (ref 3.5–5)
RBC # BLD: 4.64 E12/L (ref 3.8–5.8)
RBC # BLD: NORMAL 10*6/UL
SODIUM BLD-SCNC: 130 MMOL/L (ref 132–146)
WBC # BLD: 19.9 E9/L (ref 4.5–11.5)

## 2022-05-28 PROCEDURE — 6370000000 HC RX 637 (ALT 250 FOR IP): Performed by: SPECIALIST

## 2022-05-28 PROCEDURE — 83605 ASSAY OF LACTIC ACID: CPT

## 2022-05-28 PROCEDURE — 6360000002 HC RX W HCPCS: Performed by: SPECIALIST

## 2022-05-28 PROCEDURE — 80048 BASIC METABOLIC PNL TOTAL CA: CPT

## 2022-05-28 PROCEDURE — 36415 COLL VENOUS BLD VENIPUNCTURE: CPT

## 2022-05-28 PROCEDURE — 85025 COMPLETE CBC W/AUTO DIFF WBC: CPT

## 2022-05-28 PROCEDURE — 6370000000 HC RX 637 (ALT 250 FOR IP): Performed by: FAMILY MEDICINE

## 2022-05-28 PROCEDURE — 82962 GLUCOSE BLOOD TEST: CPT

## 2022-05-28 PROCEDURE — 6360000002 HC RX W HCPCS: Performed by: NURSE PRACTITIONER

## 2022-05-28 PROCEDURE — 2580000003 HC RX 258: Performed by: STUDENT IN AN ORGANIZED HEALTH CARE EDUCATION/TRAINING PROGRAM

## 2022-05-28 PROCEDURE — 6370000000 HC RX 637 (ALT 250 FOR IP): Performed by: NURSE PRACTITIONER

## 2022-05-28 PROCEDURE — 99254 IP/OBS CNSLTJ NEW/EST MOD 60: CPT | Performed by: TRANSPLANT SURGERY

## 2022-05-28 PROCEDURE — 99232 SBSQ HOSP IP/OBS MODERATE 35: CPT | Performed by: FAMILY MEDICINE

## 2022-05-28 PROCEDURE — 99222 1ST HOSP IP/OBS MODERATE 55: CPT | Performed by: INTERNAL MEDICINE

## 2022-05-28 PROCEDURE — 2580000003 HC RX 258: Performed by: NURSE PRACTITIONER

## 2022-05-28 PROCEDURE — 2060000000 HC ICU INTERMEDIATE R&B

## 2022-05-28 PROCEDURE — 2500000003 HC RX 250 WO HCPCS: Performed by: SPECIALIST

## 2022-05-28 RX ORDER — LANOLIN ALCOHOL/MO/W.PET/CERES
6 CREAM (GRAM) TOPICAL NIGHTLY
Status: DISCONTINUED | OUTPATIENT
Start: 2022-05-28 | End: 2022-06-01 | Stop reason: HOSPADM

## 2022-05-28 RX ORDER — INSULIN GLARGINE 100 [IU]/ML
85 INJECTION, SOLUTION SUBCUTANEOUS EVERY MORNING
Status: DISCONTINUED | OUTPATIENT
Start: 2022-05-29 | End: 2022-05-29

## 2022-05-28 RX ADMIN — ROSUVASTATIN CALCIUM 20 MG: 20 TABLET, FILM COATED ORAL at 21:35

## 2022-05-28 RX ADMIN — OXYCODONE 5 MG: 5 TABLET ORAL at 21:35

## 2022-05-28 RX ADMIN — OXYCODONE 5 MG: 5 TABLET ORAL at 14:50

## 2022-05-28 RX ADMIN — ESCITALOPRAM OXALATE 5 MG: 10 TABLET ORAL at 08:43

## 2022-05-28 RX ADMIN — MUPIROCIN: 20 OINTMENT TOPICAL at 21:37

## 2022-05-28 RX ADMIN — INSULIN LISPRO 9 UNITS: 100 INJECTION, SOLUTION INTRAVENOUS; SUBCUTANEOUS at 12:03

## 2022-05-28 RX ADMIN — ENOXAPARIN SODIUM 30 MG: 100 INJECTION SUBCUTANEOUS at 08:43

## 2022-05-28 RX ADMIN — INSULIN LISPRO 6 UNITS: 100 INJECTION, SOLUTION INTRAVENOUS; SUBCUTANEOUS at 08:50

## 2022-05-28 RX ADMIN — SODIUM CHLORIDE, PRESERVATIVE FREE 10 ML: 5 INJECTION INTRAVENOUS at 08:44

## 2022-05-28 RX ADMIN — SODIUM CHLORIDE: 9 INJECTION, SOLUTION INTRAVENOUS at 10:43

## 2022-05-28 RX ADMIN — Medication 2000 MG: at 06:26

## 2022-05-28 RX ADMIN — OXYCODONE 5 MG: 5 TABLET ORAL at 08:49

## 2022-05-28 RX ADMIN — LINEZOLID 600 MG: 600 TABLET, FILM COATED ORAL at 08:44

## 2022-05-28 RX ADMIN — LINEZOLID 600 MG: 600 TABLET, FILM COATED ORAL at 21:30

## 2022-05-28 RX ADMIN — Medication 6 MG: at 21:35

## 2022-05-28 RX ADMIN — INSULIN LISPRO 6 UNITS: 100 INJECTION, SOLUTION INTRAVENOUS; SUBCUTANEOUS at 16:52

## 2022-05-28 RX ADMIN — LISINOPRIL 20 MG: 20 TABLET ORAL at 08:43

## 2022-05-28 RX ADMIN — MUPIROCIN: 20 OINTMENT TOPICAL at 08:44

## 2022-05-28 RX ADMIN — Medication 2000 MG: at 22:59

## 2022-05-28 RX ADMIN — OXYCODONE 5 MG: 5 TABLET ORAL at 00:06

## 2022-05-28 RX ADMIN — ALLOPURINOL 300 MG: 300 TABLET ORAL at 08:43

## 2022-05-28 RX ADMIN — ASPIRIN 81 MG: 81 TABLET, COATED ORAL at 08:43

## 2022-05-28 RX ADMIN — Medication 2000 MG: at 15:00

## 2022-05-28 RX ADMIN — INSULIN LISPRO 3 UNITS: 100 INJECTION, SOLUTION INTRAVENOUS; SUBCUTANEOUS at 21:44

## 2022-05-28 RX ADMIN — INSULIN GLARGINE 100 UNITS: 100 INJECTION, SOLUTION SUBCUTANEOUS at 08:50

## 2022-05-28 RX ADMIN — OXYCODONE 5 MG: 5 TABLET ORAL at 04:34

## 2022-05-28 ASSESSMENT — PAIN SCALES - GENERAL
PAINLEVEL_OUTOF10: 9
PAINLEVEL_OUTOF10: 5
PAINLEVEL_OUTOF10: 0
PAINLEVEL_OUTOF10: 2

## 2022-05-28 ASSESSMENT — PAIN SCALES - WONG BAKER
WONGBAKER_NUMERICALRESPONSE: 0
WONGBAKER_NUMERICALRESPONSE: 0

## 2022-05-28 ASSESSMENT — PAIN DESCRIPTION - LOCATION: LOCATION: BACK

## 2022-05-28 ASSESSMENT — PAIN DESCRIPTION - DESCRIPTORS: DESCRIPTORS: BURNING;TIGHTNESS

## 2022-05-28 ASSESSMENT — PAIN - FUNCTIONAL ASSESSMENT: PAIN_FUNCTIONAL_ASSESSMENT: ACTIVITIES ARE NOT PREVENTED

## 2022-05-28 ASSESSMENT — LIFESTYLE VARIABLES: SMOKING_STATUS: 0

## 2022-05-28 NOTE — CONSULTS
ENDOCRINOLOGY INITIAL CONSULTATION NOTE      Date of admission: 5/27/2022  Date of service: 5/28/2022  Admitting physician: Neida Gunn MD   Primary Care Physician: Deb Short MD  Consultant physician: Say Issa MD     Reason for the consultation:  Uncontrolled DM    History of Present Illness: The history is provided by the patient. Accuracy of the patient data is excellent    Shayne Branham is a very pleasant 42 y.o. old male with PMH obesity, CHRYSTAL skin infection, poorly controlled DM type 2 and other listed below admitted to [facility] on 5/27/2022 because of skin infection in Lt upper back , endocrine service was consulted for diabetes management. The patient failed outpatient antibiotics treatment. He underwent I&D few days before admission. Prior to admission  The patient was diagnosed with type 2 DM long time ago. Prior to admission patient was on Lnatus 100u BID, Humalog 40u with meals . Patient has had no hypoglycemic episodes. Patient has not been eating consistent carbohydrate meals and wasn't consistent with check BG. He admit poor compliance with his diet and drinking a lot of regular sodas. In addition, patient denied macrovascular or microvascular complications.  Has been up to date with yearly diabetic eye exam.   Lab Results   Component Value Date    LABA1C 12.7 05/27/2022       Inpatient diet:   Carb Restricted diet     Point of care glucose monitoring   (Independently reviewed)   Recent Labs     05/27/22  1820 05/27/22 2001 05/28/22  0626   GLUMET 357* 389* 247*       Past medical history:   Past Medical History:   Diagnosis Date    Hyperlipidemia     Hypertension     Kidney stones     Multiple times    Type II or unspecified type diabetes mellitus without mention of complication, not stated as uncontrolled        Past surgical history:  Past Surgical History:   Procedure Laterality Date    FRACTURE SURGERY  1992    Lt leg & ankle fx   [address]  2000       Social history:   Tobacco:   reports that he has never smoked. He has never used smokeless tobacco.  Alcohol:   reports no history of alcohol use. Drugs:   reports no history of drug use. Family history:    Family History   Problem Relation Age of Onset    Diabetes Mother     High Blood Pressure Mother     Cancer Father         prostate    Heart Disease Father         MI @ 52yrs old/ also 1 stent       Allergy and drug reactions: Allergies   Allergen Reactions    Sulfa Antibiotics Hives       Scheduled Meds:   [START ON 5/29/2022] insulin glargine  85 Units SubCUTAneous QAM    allopurinol  300 mg Oral Daily    aspirin  81 mg Oral Daily    insulin lispro  0-18 Units SubCUTAneous TID WC    insulin lispro  0-9 Units SubCUTAneous Nightly    lisinopril  20 mg Oral Daily    rosuvastatin  20 mg Oral Nightly    sodium chloride flush  5-40 mL IntraVENous 2 times per day    enoxaparin  30 mg SubCUTAneous BID    linezolid  600 mg Oral 2 times per day    ceFAZolin  2,000 mg IntraVENous Q8H    mupirocin   Nasal BID    escitalopram  5 mg Oral Daily       PRN Meds:   glucose, 4 tablet, PRN  dextrose bolus, 125 mL, PRN   Or  dextrose bolus, 250 mL, PRN  glucagon (rDNA), 1 mg, PRN  dextrose, 100 mL/hr, PRN  sodium chloride flush, 5-40 mL, PRN  sodium chloride, , PRN  ondansetron, 4 mg, Q8H PRN   Or  ondansetron, 4 mg, Q6H PRN  polyethylene glycol, 17 g, Daily PRN  acetaminophen, 650 mg, Q6H PRN   Or  acetaminophen, 650 mg, Q6H PRN  magnesium sulfate, 2,000 mg, PRN  potassium chloride, 40 mEq, PRN   Or  potassium alternative oral replacement, 40 mEq, PRN   Or  potassium chloride, 10 mEq, PRN  oxyCODONE, 5 mg, Q4H PRN      Continuous Infusions:   sodium chloride 200 mL/hr at 05/28/22 1043    dextrose      sodium chloride         Review of Systems  All systems reviewed.  All negative except for symptoms mentioned in HPI     OBJECTIVE    /62   Pulse (!) 109   Temp 98.8 °F (37.1 °C) (Oral)   Resp 18   Ht 5' 9\" (1.753 m)   Wt 251 lb 6.4 oz (114 kg)   SpO2 97%   BMI 37.13 kg/m²   No intake or output data in the 24 hours ending 05/28/22 1054    Physical examination:  General: awake alert, oriented x3  HEENT: normocephalic non traumatic, no exophthalmos   Neck: supple, No thyroid tenderness,  Pulm: good equal air entry no added sounds  CVS: S1 + S2  Abd: soft lax, no tenderness  Skin: + skin infection Lt upper back   Neuro: CN intact, sensation present bilateral , muscle power normal  Psych: normal mood, and affect    Review of Laboratory Data:  I personally reviewed the following labs:   Recent Labs     05/27/22  1353 05/28/22  0440   WBC 19.1* 19.9*   RBC 5.36 4.64   HGB 15.4 13.4   HCT 46.7 41.0   MCV 87.1 88.4   MCH 28.7 28.9   MCHC 33.0 32.7   RDW 12.6 12.9    280   MPV 10.9 10.7     Recent Labs     05/27/22  1353 05/28/22  0440   * 130*   K 4.4 4.2   CL 86* 93*   CO2 17* 22   BUN 14 11   CREATININE 0.7 0.6*   GLUCOSE 488* 284*   CALCIUM 9.2 8.2*   PROT 7.8  --    LABALBU 3.8  --    BILITOT 0.7  --    ALKPHOS 154*  --    AST 10  --    ALT 14  --      Beta-Hydroxybutyrate   Date Value Ref Range Status   05/27/2022 4.19 (H) 0.02 - 0.27 mmol/L Final     Lab Results   Component Value Date    LABA1C 12.7 05/27/2022    LABA1C 11.4 04/19/2022    LABA1C 10.8 09/02/2020     Lab Results   Component Value Date/Time    TSH 3.860 04/19/2022 12:00 PM     Lab Results   Component Value Date    LABA1C 12.7 05/27/2022    GLUCOSE 284 05/28/2022    MALBCR <30 mg/g 04/19/2022     Lab Results   Component Value Date    TRIG 392 04/19/2022    HDL 47 04/19/2022    LDLCALC 162 04/19/2022    CHOL 287 04/19/2022       Blood culture   No results found for: [facility]    Radiology:  CT CHEST W CONTRAST   Final Result   1. Findings consistent with extensive cellulitis involving the subcutaneous   tissues in the left upper back. No drainable abscess seen. 2. No evidence of acute intrathoracic process.    3. 2 mm nodule in the superior aspect of the right middle lobe. If the   patient is low risk, no routine follow-up is indicated. Please see below for   Fleischner society follow-up guidelines. RECOMMENDATIONS:   Fleischner Society guidelines for follow-up and management of incidentally   detected pulmonary nodules:      Single Solid Nodule:      Nodule size less than 6 mm   In a low-risk patient, no routine follow-up. In a high-risk patient, optional CT at 12 months. Nodule size equals 6-8 mm   In a low-risk patient, CT at 6-12 months, then consider CT at 18-24 months. In a high-risk patient, CT at 6-12 months, then CT at 18-24 months. Nodule size greater than 8 mm         In a low-risk patient, consider CT at 3 months, PET/CT, or tissue sampling. In a high-risk patient, consider CT at 3 months, PET/CT, or tissue sampling. Multiple Solid Nodules:      Nodule size less than 6 mm   In a low-risk patient, no routine follow-up. In a high-risk patient, optional CT at 12 months. Nodule size equals 6-8 mm   In a low-risk patient, CT at 3-6 months, then consider CT at 18-24 months. In a high-risk patient, CT at 3-6 months, then CT at 18-24 months. Nodule size greater than 8 mm   In a low-risk patient, CT at 3-6 months, then consider CT at 18-24 months. In a high-risk patient, CT at 3-6 months, then CT at 18-24 months. - Low risk patients include individuals with minimal or absent history of   smoking and other known risk factors. - High risk patients include individuals with a history or smoking or known   risk factors. Radiology 2017 http://pubs. rsna.org/doi/full/10.1148/radiol. 0219694401             Medical Records/Labs/Images review:   I personally reviewed and summarized previous records   All labs and imaging were reviewed independently     ASSESSMENT & PLAN   Samuelvenkat Arauz, a 42 y.o.-old male seen today for inpatient diabetes management     Diabetes Mellitus type 2   · Patient's diabetes is very uncontrolled due to poor compliance with diet and insulin therapy   · There is a huge discrepancy between doses of  long acting and short acting insulin. The patient was on very high dose of insulin at home and even with that A1c still 12.7%   · We will adjust his insulin regimen to  · Lantus 85 u daily in AM   · Humalog 30 u with meals (to start tomorrow because he received high doses of lantus last night and this morning)   · High dose sliding scale    · Will likely significantly benefit from adding GLP-1 agonist and DPP-4 inhibitors to his regimen as an outpatient   · We also discussed switching to u-500 insulin in the future if insulin requirement remaine high   · Continue glucose check with meals and at bedtime   · Will titrate insulin dose based on the blood glucose trend & insulin requirement    Skin infection   · Managed by primary, ID and surgery service   · Discussion th importance of controlling DM in management of skin infection     The above issues were reviewed with the patient who understood and agreed with the plan. Thank you for allowing us to participate in the care of this patient. Please do not hesitate to contact us with any additional questions. Colleen Miller MD  Endocrinologist, [facility] Endocrinology   [address] [place] [address]   Phone: [phone]  Fax: [phone]  --------------------------------------------  An electronic signature was used to authenticate this note.  Roddy Evans MD on 5/28/2022 at 10:54 AM

## 2022-05-28 NOTE — PROGRESS NOTES
[facility] Progress Note    Admitting Date and Time: 5/27/2022  1:10 PM  Admit Dx: Cellulitis and abscess of trunk [L03.319, Y88.271]  Hypochloremia [E87.8]  Hyponatremia [E87.1]  Back abscess [L02.212]  Abscess of left shoulder [L02.414]  Diabetic ketoacidosis without coma associated with type 2 diabetes mellitus ([address] [E11.10]    Subjective:  Patient is being followed for Cellulitis and abscess of trunk [L03.319, L02.219]  Hypochloremia [E87.8]  Hyponatremia [E87.1]  Back abscess [L02.212]  Abscess of left shoulder [L02.414]  Diabetic ketoacidosis without coma associated with type 2 diabetes mellitus ([facility] [address] [E11.10]     Pt feels a lot more pressure building up in the abscess. Pain is controlled on what were given him now. No further fever since admission. Feeling a bit better since he was admitted. Eating and drinking okay. No events overnight. No cough wheezing or shortness of breath. Per RN: Nothing to report    ROS: denies fever, chills, cp, sob, n/v, HA unless stated above.       [START ON 5/29/2022] insulin glargine  85 Units SubCUTAneous QAM    melatonin  6 mg Oral Nightly    allopurinol  300 mg Oral Daily    aspirin  81 mg Oral Daily    insulin lispro  0-18 Units SubCUTAneous TID WC    insulin lispro  0-9 Units SubCUTAneous Nightly    lisinopril  20 mg Oral Daily    rosuvastatin  20 mg Oral Nightly    sodium chloride flush  5-40 mL IntraVENous 2 times per day    enoxaparin  30 mg SubCUTAneous BID    linezolid  600 mg Oral 2 times per day    ceFAZolin  2,000 mg IntraVENous Q8H    mupirocin   Nasal BID    escitalopram  5 mg Oral Daily     glucose, 4 tablet, PRN  dextrose bolus, 125 mL, PRN   Or  dextrose bolus, 250 mL, PRN  glucagon (rDNA), 1 mg, PRN  dextrose, 100 mL/hr, PRN  sodium chloride flush, 5-40 mL, PRN  sodium chloride, , PRN  ondansetron, 4 mg, Q8H PRN   Or  ondansetron, 4 mg, Q6H PRN  polyethylene glycol, 17 g, Daily PRN  acetaminophen, 650 mg, Q6H PRN Or  acetaminophen, 650 mg, Q6H PRN  magnesium sulfate, 2,000 mg, PRN  potassium chloride, 40 mEq, PRN   Or  potassium alternative oral replacement, 40 mEq, PRN   Or  potassium chloride, 10 mEq, PRN  oxyCODONE, 5 mg, Q4H PRN         Objective:    /62   Pulse (!) 109   Temp 98.8 °F (37.1 °C) (Oral)   Resp 18   Ht 5' 9\" (1.753 m)   Wt 251 lb 6.4 oz (114 kg)   SpO2 97%   BMI 37.13 kg/m²     General Appearance: alert and oriented to person, place and time and in no acute distress, friend in the room  Skin: warm and dry, good turgor, no rashes, no jaundice  Abscess of upper back --4 cm in diameter approximately, severe induration, pain with palpation, erythema looks mostly contained to the abscess area itself, no odor, there is some pustules surrounding the main central abscess opening, no gangrene, tiny bit of serosanguineous drainage on the gauze  Head: normocephalic and atraumatic  Eyes: pupils equal, round, and reactive to light, extraocular eye movements intact, conjunctivae normal  Neck: neck supple and non tender without mass   Pulmonary/Chest: clear to auscultation bilaterally- no wheezes, rales or rhonchi, normal air movement, no respiratory distress  Cardiovascular: normal rate, normal S1 and S2 and no carotid bruits  Abdomen: soft, non-tender, non-distended, normal bowel sounds, no masses or organomegaly  Extremities: no cyanosis, no clubbing and no edema  Neurologic: no cranial nerve deficit and speech normal        Recent Labs     05/27/22  1353 05/28/22  0440   * 130*   K 4.4 4.2   CL 86* 93*   CO2 17* 22   BUN 14 11   CREATININE 0.7 0.6*   GLUCOSE 488* 284*   CALCIUM 9.2 8.2*       Recent Labs     05/27/22  1353 05/28/22  0440   WBC 19.1* 19.9*   RBC 5.36 4.64   HGB 15.4 13.4   HCT 46.7 41.0   MCV 87.1 88.4   MCH 28.7 28.9   MCHC 33.0 32.7   RDW 12.6 12.9    280   MPV 10.9 10.7       Radiology:   CT of the chest shows cellulitis in the subcutaneous tissues at the area of the abscess in the upper left back, right-sided 2 mm right middle lobe nodule    Assessment:    Principal Problem:    Back abscess  Active Problems:    Abscess of left shoulder    Cellulitis of back    Pulmonary nodule    Poorly controlled type 2 diabetes mellitus (HCC)    HTN (hypertension)    Hyperlipidemia  Resolved Problems:    * No resolved hospital problems. *      Plan:  1. Sepsis --improving, still tachycardic; due to left upper back cellulitis and abscess  -leukocytosis, tachycardia, lactic acidosis, fever upon ER arrival  -ID on board, have started IV linezolid and cefazolin  -Await blood cultures  -Await wound culture  -aggressive IV hydration     2. Left Shoulder abscess  - given dose vancomyin in ER  -General surgery consulted  - cosult ID for abx recommendations and management     3. DM2: Uncontrolled  -with hyperglycemia  - recent A1c 11.4.   - Hold janumet, Farxiga  - 04/19/22 TSH 3.860  -resume home lantus, add low carb diet, high dose insulin slide sale, hypoglycemia protocol  -Endocrinology on board  - aggressive IV hydration  -AG 24  -Watch sugars intensively 4 times per day  -Adjust insulin as needed  -I appreciate Dr. Aiyana Santiago input today     4. HTN -- much better control today on usual home lisinopril; may have been uncontrolled upon admit due to pain from the abscess  -Continue home lisinopril     5. HLD   -resume crestor 10mg  - 04/19/22  lipid panel Chol 287 HDL 47  Trig 392     6. Hx Kidney stones   - allopurinol    7. Morbid obesity -- BMI 37  -Lovenox dosed at 30 mg BID        Code Status: TOTAL support  DVT prophylaxis: Lovenox      NOTE: This report was transcribed using voice recognition software. Every effort was made to ensure accuracy; however, inadvertent computerized transcription errors may be present.     Electronically signed by Stacy Garcia DO on 5/28/2022 at 1:54 PM

## 2022-05-28 NOTE — PLAN OF CARE
Problem: Pain  Goal: Verbalizes/displays adequate comfort level or baseline comfort level  5/28/2022 1047 by Mathieu Jean-Baptiste RN  Outcome: Progressing  5/28/2022 0223 by Duncan Jin RN  Outcome: Progressing     Problem: Safety - Adult  Goal: Free from fall injury  5/28/2022 1047 by Mathieu Jean-Baptiste RN  Outcome: Progressing  5/28/2022 0223 by Duncan Jin RN  Outcome: Progressing     Problem: ABCDS Injury Assessment  Goal: Absence of physical injury  5/28/2022 1047 by Mathieu Jean-Baptiste RN  Outcome: Progressing  5/28/2022 0223 by Duncan Jin RN  Outcome: Progressing

## 2022-05-29 ENCOUNTER — ANESTHESIA (OUTPATIENT)
Dept: OPERATING ROOM | Age: 47
DRG: 853 | End: 2022-05-29
Payer: COMMERCIAL

## 2022-05-29 PROBLEM — M79.89 NECROTIZING SOFT TISSUE INFECTION: Status: ACTIVE | Noted: 2022-05-29

## 2022-05-29 PROBLEM — I48.91 ATRIAL FIBRILLATION, RAPID (HCC): Status: ACTIVE | Noted: 2022-05-29

## 2022-05-29 LAB
ANAEROBIC CULTURE: NORMAL
ANION GAP SERPL CALCULATED.3IONS-SCNC: 11 MMOL/L (ref 7–16)
BASOPHILS ABSOLUTE: 0.06 E9/L (ref 0–0.2)
BASOPHILS RELATIVE PERCENT: 0.3 % (ref 0–2)
BUN BLDV-MCNC: 6 MG/DL (ref 6–20)
CALCIUM SERPL-MCNC: 7.3 MG/DL (ref 8.6–10.2)
CHLORIDE BLD-SCNC: 100 MMOL/L (ref 98–107)
CO2: 24 MMOL/L (ref 22–29)
CREAT SERPL-MCNC: 0.5 MG/DL (ref 0.7–1.2)
EOSINOPHILS ABSOLUTE: 0.21 E9/L (ref 0.05–0.5)
EOSINOPHILS RELATIVE PERCENT: 1.2 % (ref 0–6)
GFR AFRICAN AMERICAN: >60
GFR NON-AFRICAN AMERICAN: >60 ML/MIN/1.73
GLUCOSE BLD-MCNC: 154 MG/DL (ref 74–99)
HCT VFR BLD CALC: 42.1 % (ref 37–54)
HEMOGLOBIN: 13.9 G/DL (ref 12.5–16.5)
IMMATURE GRANULOCYTES #: 0.08 E9/L
IMMATURE GRANULOCYTES %: 0.4 % (ref 0–5)
LYMPHOCYTES ABSOLUTE: 1.26 E9/L (ref 1.5–4)
LYMPHOCYTES RELATIVE PERCENT: 6.9 % (ref 20–42)
MAGNESIUM: 2.1 MG/DL (ref 1.6–2.6)
MCH RBC QN AUTO: 29.3 PG (ref 26–35)
MCHC RBC AUTO-ENTMCNC: 33 % (ref 32–34.5)
MCV RBC AUTO: 88.8 FL (ref 80–99.9)
METER GLUCOSE: 142 MG/DL (ref 74–99)
METER GLUCOSE: 188 MG/DL (ref 74–99)
METER GLUCOSE: 199 MG/DL (ref 74–99)
METER GLUCOSE: 248 MG/DL (ref 74–99)
METER GLUCOSE: 318 MG/DL (ref 74–99)
MONOCYTES ABSOLUTE: 2.13 E9/L (ref 0.1–0.95)
MONOCYTES RELATIVE PERCENT: 11.7 % (ref 2–12)
NEUTROPHILS ABSOLUTE: 14.39 E9/L (ref 1.8–7.3)
NEUTROPHILS RELATIVE PERCENT: 79.5 % (ref 43–80)
ORGANISM: ABNORMAL
PDW BLD-RTO: 12.8 FL (ref 11.5–15)
PLATELET # BLD: 323 E9/L (ref 130–450)
PMV BLD AUTO: 10.6 FL (ref 7–12)
POTASSIUM REFLEX MAGNESIUM: 3.4 MMOL/L (ref 3.5–5)
RBC # BLD: 4.74 E12/L (ref 3.8–5.8)
RBC # BLD: NORMAL 10*6/UL
SODIUM BLD-SCNC: 135 MMOL/L (ref 132–146)
WBC # BLD: 18.1 E9/L (ref 4.5–11.5)
WOUND/ABSCESS: ABNORMAL

## 2022-05-29 PROCEDURE — 36415 COLL VENOUS BLD VENIPUNCTURE: CPT

## 2022-05-29 PROCEDURE — 85025 COMPLETE CBC W/AUTO DIFF WBC: CPT

## 2022-05-29 PROCEDURE — 2500000003 HC RX 250 WO HCPCS: Performed by: NURSE ANESTHETIST, CERTIFIED REGISTERED

## 2022-05-29 PROCEDURE — 6360000002 HC RX W HCPCS: Performed by: NURSE ANESTHETIST, CERTIFIED REGISTERED

## 2022-05-29 PROCEDURE — 87205 SMEAR GRAM STAIN: CPT

## 2022-05-29 PROCEDURE — 87015 SPECIMEN INFECT AGNT CONCNTJ: CPT

## 2022-05-29 PROCEDURE — 6370000000 HC RX 637 (ALT 250 FOR IP): Performed by: TRANSPLANT SURGERY

## 2022-05-29 PROCEDURE — 2500000003 HC RX 250 WO HCPCS: Performed by: FAMILY MEDICINE

## 2022-05-29 PROCEDURE — 0KB60ZZ EXCISION OF LEFT SHOULDER MUSCLE, OPEN APPROACH: ICD-10-PCS | Performed by: TRANSPLANT SURGERY

## 2022-05-29 PROCEDURE — 7100000000 HC PACU RECOVERY - FIRST 15 MIN: Performed by: TRANSPLANT SURGERY

## 2022-05-29 PROCEDURE — 83735 ASSAY OF MAGNESIUM: CPT

## 2022-05-29 PROCEDURE — 3600000012 HC SURGERY LEVEL 2 ADDTL 15MIN: Performed by: TRANSPLANT SURGERY

## 2022-05-29 PROCEDURE — 87186 SC STD MICRODIL/AGAR DIL: CPT

## 2022-05-29 PROCEDURE — 6370000000 HC RX 637 (ALT 250 FOR IP): Performed by: INTERNAL MEDICINE

## 2022-05-29 PROCEDURE — 6360000002 HC RX W HCPCS: Performed by: INTERNAL MEDICINE

## 2022-05-29 PROCEDURE — 7100000001 HC PACU RECOVERY - ADDTL 15 MIN: Performed by: TRANSPLANT SURGERY

## 2022-05-29 PROCEDURE — 99232 SBSQ HOSP IP/OBS MODERATE 35: CPT | Performed by: INTERNAL MEDICINE

## 2022-05-29 PROCEDURE — 6360000002 HC RX W HCPCS: Performed by: TRANSPLANT SURGERY

## 2022-05-29 PROCEDURE — 3600000002 HC SURGERY LEVEL 2 BASE: Performed by: TRANSPLANT SURGERY

## 2022-05-29 PROCEDURE — 87070 CULTURE OTHR SPECIMN AEROBIC: CPT

## 2022-05-29 PROCEDURE — 2580000003 HC RX 258: Performed by: FAMILY MEDICINE

## 2022-05-29 PROCEDURE — 87206 SMEAR FLUORESCENT/ACID STAI: CPT

## 2022-05-29 PROCEDURE — 87116 MYCOBACTERIA CULTURE: CPT

## 2022-05-29 PROCEDURE — 88305 TISSUE EXAM BY PATHOLOGIST: CPT

## 2022-05-29 PROCEDURE — 3700000000 HC ANESTHESIA ATTENDED CARE: Performed by: TRANSPLANT SURGERY

## 2022-05-29 PROCEDURE — 6370000000 HC RX 637 (ALT 250 FOR IP): Performed by: NURSE PRACTITIONER

## 2022-05-29 PROCEDURE — 6370000000 HC RX 637 (ALT 250 FOR IP): Performed by: SPECIALIST

## 2022-05-29 PROCEDURE — 82962 GLUCOSE BLOOD TEST: CPT

## 2022-05-29 PROCEDURE — 93005 ELECTROCARDIOGRAM TRACING: CPT | Performed by: ANESTHESIOLOGY

## 2022-05-29 PROCEDURE — 2580000003 HC RX 258: Performed by: NURSE PRACTITIONER

## 2022-05-29 PROCEDURE — 11046 DBRDMT MUSC&/FSCA EA ADDL: CPT | Performed by: TRANSPLANT SURGERY

## 2022-05-29 PROCEDURE — 99232 SBSQ HOSP IP/OBS MODERATE 35: CPT | Performed by: FAMILY MEDICINE

## 2022-05-29 PROCEDURE — 11043 DBRDMT MUSC&/FSCA 1ST 20/<: CPT | Performed by: TRANSPLANT SURGERY

## 2022-05-29 PROCEDURE — 87077 CULTURE AEROBIC IDENTIFY: CPT

## 2022-05-29 PROCEDURE — 2580000003 HC RX 258: Performed by: TRANSPLANT SURGERY

## 2022-05-29 PROCEDURE — 6360000002 HC RX W HCPCS: Performed by: SPECIALIST

## 2022-05-29 PROCEDURE — 87102 FUNGUS ISOLATION CULTURE: CPT

## 2022-05-29 PROCEDURE — 2709999900 HC NON-CHARGEABLE SUPPLY: Performed by: TRANSPLANT SURGERY

## 2022-05-29 PROCEDURE — 3700000001 HC ADD 15 MINUTES (ANESTHESIA): Performed by: TRANSPLANT SURGERY

## 2022-05-29 PROCEDURE — 80048 BASIC METABOLIC PNL TOTAL CA: CPT

## 2022-05-29 PROCEDURE — 2060000000 HC ICU INTERMEDIATE R&B

## 2022-05-29 RX ORDER — SODIUM CHLORIDE 0.9 % (FLUSH) 0.9 %
5-40 SYRINGE (ML) INJECTION PRN
Status: DISCONTINUED | OUTPATIENT
Start: 2022-05-29 | End: 2022-05-29 | Stop reason: HOSPADM

## 2022-05-29 RX ORDER — ENOXAPARIN SODIUM 100 MG/ML
40 INJECTION SUBCUTANEOUS DAILY
Status: DISCONTINUED | OUTPATIENT
Start: 2022-05-29 | End: 2022-05-30

## 2022-05-29 RX ORDER — SODIUM CHLORIDE 9 MG/ML
INJECTION, SOLUTION INTRAVENOUS PRN
Status: DISCONTINUED | OUTPATIENT
Start: 2022-05-29 | End: 2022-05-29 | Stop reason: HOSPADM

## 2022-05-29 RX ORDER — ONDANSETRON 2 MG/ML
INJECTION INTRAMUSCULAR; INTRAVENOUS PRN
Status: DISCONTINUED | OUTPATIENT
Start: 2022-05-29 | End: 2022-05-29 | Stop reason: SDUPTHER

## 2022-05-29 RX ORDER — SODIUM CHLORIDE 0.9 % (FLUSH) 0.9 %
5-40 SYRINGE (ML) INJECTION EVERY 12 HOURS SCHEDULED
Status: DISCONTINUED | OUTPATIENT
Start: 2022-05-29 | End: 2022-06-01 | Stop reason: HOSPADM

## 2022-05-29 RX ORDER — SODIUM CHLORIDE 0.9 % (FLUSH) 0.9 %
5-40 SYRINGE (ML) INJECTION PRN
Status: DISCONTINUED | OUTPATIENT
Start: 2022-05-29 | End: 2022-06-01 | Stop reason: HOSPADM

## 2022-05-29 RX ORDER — MIDAZOLAM HYDROCHLORIDE 1 MG/ML
INJECTION INTRAMUSCULAR; INTRAVENOUS PRN
Status: DISCONTINUED | OUTPATIENT
Start: 2022-05-29 | End: 2022-05-29 | Stop reason: SDUPTHER

## 2022-05-29 RX ORDER — INSULIN LISPRO 100 [IU]/ML
18 INJECTION, SOLUTION INTRAVENOUS; SUBCUTANEOUS
Status: DISCONTINUED | OUTPATIENT
Start: 2022-05-29 | End: 2022-05-31

## 2022-05-29 RX ORDER — OXYCODONE HYDROCHLORIDE 5 MG/1
10 TABLET ORAL EVERY 4 HOURS PRN
Status: DISCONTINUED | OUTPATIENT
Start: 2022-05-29 | End: 2022-06-01 | Stop reason: HOSPADM

## 2022-05-29 RX ORDER — DILTIAZEM HYDROCHLORIDE 5 MG/ML
5 INJECTION INTRAVENOUS ONCE
Status: COMPLETED | OUTPATIENT
Start: 2022-05-29 | End: 2022-05-29

## 2022-05-29 RX ORDER — SODIUM CHLORIDE 9 MG/ML
INJECTION, SOLUTION INTRAVENOUS PRN
Status: DISCONTINUED | OUTPATIENT
Start: 2022-05-29 | End: 2022-06-01 | Stop reason: HOSPADM

## 2022-05-29 RX ORDER — NEOSTIGMINE METHYLSULFATE 1 MG/ML
INJECTION, SOLUTION INTRAVENOUS PRN
Status: DISCONTINUED | OUTPATIENT
Start: 2022-05-29 | End: 2022-05-29 | Stop reason: SDUPTHER

## 2022-05-29 RX ORDER — ESCITALOPRAM OXALATE 10 MG/1
10 TABLET ORAL DAILY
Status: DISCONTINUED | OUTPATIENT
Start: 2022-05-30 | End: 2022-06-01 | Stop reason: HOSPADM

## 2022-05-29 RX ORDER — PHENYLEPHRINE HCL IN 0.9% NACL 1 MG/10 ML
SYRINGE (ML) INTRAVENOUS PRN
Status: DISCONTINUED | OUTPATIENT
Start: 2022-05-29 | End: 2022-05-29 | Stop reason: SDUPTHER

## 2022-05-29 RX ORDER — OXYCODONE HYDROCHLORIDE 5 MG/1
5 TABLET ORAL EVERY 4 HOURS PRN
Status: DISCONTINUED | OUTPATIENT
Start: 2022-05-29 | End: 2022-06-01 | Stop reason: HOSPADM

## 2022-05-29 RX ORDER — LIDOCAINE HYDROCHLORIDE 20 MG/ML
INJECTION, SOLUTION EPIDURAL; INFILTRATION; INTRACAUDAL; PERINEURAL PRN
Status: DISCONTINUED | OUTPATIENT
Start: 2022-05-29 | End: 2022-05-29 | Stop reason: SDUPTHER

## 2022-05-29 RX ORDER — ONDANSETRON 2 MG/ML
4 INJECTION INTRAMUSCULAR; INTRAVENOUS EVERY 6 HOURS PRN
Status: DISCONTINUED | OUTPATIENT
Start: 2022-05-29 | End: 2022-06-01 | Stop reason: HOSPADM

## 2022-05-29 RX ORDER — FENTANYL CITRATE 50 UG/ML
INJECTION, SOLUTION INTRAMUSCULAR; INTRAVENOUS PRN
Status: DISCONTINUED | OUTPATIENT
Start: 2022-05-29 | End: 2022-05-29 | Stop reason: SDUPTHER

## 2022-05-29 RX ORDER — DIGOXIN 0.25 MG/ML
500 INJECTION INTRAMUSCULAR; INTRAVENOUS ONCE
Status: COMPLETED | OUTPATIENT
Start: 2022-05-29 | End: 2022-05-29

## 2022-05-29 RX ORDER — ONDANSETRON 4 MG/1
4 TABLET, ORALLY DISINTEGRATING ORAL EVERY 8 HOURS PRN
Status: DISCONTINUED | OUTPATIENT
Start: 2022-05-29 | End: 2022-06-01 | Stop reason: HOSPADM

## 2022-05-29 RX ORDER — PROPOFOL 10 MG/ML
INJECTION, EMULSION INTRAVENOUS PRN
Status: DISCONTINUED | OUTPATIENT
Start: 2022-05-29 | End: 2022-05-29 | Stop reason: SDUPTHER

## 2022-05-29 RX ORDER — ROCURONIUM BROMIDE 10 MG/ML
INJECTION, SOLUTION INTRAVENOUS PRN
Status: DISCONTINUED | OUTPATIENT
Start: 2022-05-29 | End: 2022-05-29 | Stop reason: SDUPTHER

## 2022-05-29 RX ORDER — SODIUM CHLORIDE 0.9 % (FLUSH) 0.9 %
5-40 SYRINGE (ML) INJECTION EVERY 12 HOURS SCHEDULED
Status: DISCONTINUED | OUTPATIENT
Start: 2022-05-29 | End: 2022-05-29 | Stop reason: HOSPADM

## 2022-05-29 RX ORDER — METOPROLOL TARTRATE 50 MG/1
50 TABLET, FILM COATED ORAL ONCE
Status: COMPLETED | OUTPATIENT
Start: 2022-05-29 | End: 2022-05-29

## 2022-05-29 RX ORDER — FENTANYL CITRATE 50 UG/ML
25 INJECTION, SOLUTION INTRAMUSCULAR; INTRAVENOUS EVERY 5 MIN PRN
Status: DISCONTINUED | OUTPATIENT
Start: 2022-05-29 | End: 2022-05-29 | Stop reason: HOSPADM

## 2022-05-29 RX ORDER — INSULIN GLARGINE 100 [IU]/ML
65 INJECTION, SOLUTION SUBCUTANEOUS EVERY MORNING
Status: DISCONTINUED | OUTPATIENT
Start: 2022-05-29 | End: 2022-05-31

## 2022-05-29 RX ADMIN — Medication 3 MG: at 13:47

## 2022-05-29 RX ADMIN — OXYCODONE 5 MG: 5 TABLET ORAL at 15:30

## 2022-05-29 RX ADMIN — MUPIROCIN: 20 OINTMENT TOPICAL at 08:44

## 2022-05-29 RX ADMIN — Medication 100 MCG: at 13:27

## 2022-05-29 RX ADMIN — ONDANSETRON 4 MG: 2 INJECTION INTRAMUSCULAR; INTRAVENOUS at 13:45

## 2022-05-29 RX ADMIN — INSULIN LISPRO 3 UNITS: 100 INJECTION, SOLUTION INTRAVENOUS; SUBCUTANEOUS at 19:57

## 2022-05-29 RX ADMIN — MUPIROCIN: 20 OINTMENT TOPICAL at 19:52

## 2022-05-29 RX ADMIN — SODIUM CHLORIDE: 9 INJECTION, SOLUTION INTRAVENOUS at 16:53

## 2022-05-29 RX ADMIN — SODIUM CHLORIDE, PRESERVATIVE FREE 10 ML: 5 INJECTION INTRAVENOUS at 08:45

## 2022-05-29 RX ADMIN — PROPOFOL 200 MG: 10 INJECTION, EMULSION INTRAVENOUS at 13:05

## 2022-05-29 RX ADMIN — OXYCODONE 10 MG: 5 TABLET ORAL at 23:51

## 2022-05-29 RX ADMIN — Medication 6 MG: at 19:51

## 2022-05-29 RX ADMIN — OXYCODONE 5 MG: 5 TABLET ORAL at 02:49

## 2022-05-29 RX ADMIN — Medication 2000 MG: at 06:30

## 2022-05-29 RX ADMIN — DILTIAZEM HYDROCHLORIDE 5 MG: 5 INJECTION INTRAVENOUS at 15:30

## 2022-05-29 RX ADMIN — FENTANYL CITRATE 100 MCG: 50 INJECTION, SOLUTION INTRAMUSCULAR; INTRAVENOUS at 13:05

## 2022-05-29 RX ADMIN — LINEZOLID 600 MG: 600 TABLET, FILM COATED ORAL at 08:44

## 2022-05-29 RX ADMIN — LISINOPRIL 20 MG: 20 TABLET ORAL at 08:44

## 2022-05-29 RX ADMIN — ESCITALOPRAM OXALATE 5 MG: 10 TABLET ORAL at 08:45

## 2022-05-29 RX ADMIN — DILTIAZEM HYDROCHLORIDE 5 MG/HR: 5 INJECTION, SOLUTION INTRAVENOUS at 15:58

## 2022-05-29 RX ADMIN — METOPROLOL TARTRATE 50 MG: 50 TABLET, FILM COATED ORAL at 19:04

## 2022-05-29 RX ADMIN — Medication 2000 MG: at 15:46

## 2022-05-29 RX ADMIN — OXYCODONE 5 MG: 5 TABLET ORAL at 06:59

## 2022-05-29 RX ADMIN — MIDAZOLAM 2 MG: 1 INJECTION INTRAMUSCULAR; INTRAVENOUS at 13:05

## 2022-05-29 RX ADMIN — ENOXAPARIN SODIUM 30 MG: 100 INJECTION SUBCUTANEOUS at 19:52

## 2022-05-29 RX ADMIN — INSULIN LISPRO 12 UNITS: 100 INJECTION, SOLUTION INTRAVENOUS; SUBCUTANEOUS at 16:57

## 2022-05-29 RX ADMIN — INSULIN LISPRO 18 UNITS: 100 INJECTION, SOLUTION INTRAVENOUS; SUBCUTANEOUS at 16:56

## 2022-05-29 RX ADMIN — DIGOXIN 500 MCG: 0.25 INJECTION INTRAMUSCULAR; INTRAVENOUS at 18:00

## 2022-05-29 RX ADMIN — LIDOCAINE HYDROCHLORIDE 40 MG: 20 INJECTION, SOLUTION EPIDURAL; INFILTRATION; INTRACAUDAL; PERINEURAL at 13:05

## 2022-05-29 RX ADMIN — ROSUVASTATIN CALCIUM 20 MG: 20 TABLET, FILM COATED ORAL at 19:51

## 2022-05-29 RX ADMIN — Medication 2000 MG: at 23:51

## 2022-05-29 RX ADMIN — ALLOPURINOL 300 MG: 300 TABLET ORAL at 08:44

## 2022-05-29 RX ADMIN — Medication 100 MCG: at 13:47

## 2022-05-29 RX ADMIN — Medication 100 MCG: at 13:30

## 2022-05-29 RX ADMIN — ROCURONIUM BROMIDE 50 MG: 50 INJECTION, SOLUTION INTRAVENOUS at 13:05

## 2022-05-29 RX ADMIN — OXYCODONE 5 MG: 5 TABLET ORAL at 19:51

## 2022-05-29 RX ADMIN — ASPIRIN 81 MG: 81 TABLET, COATED ORAL at 08:47

## 2022-05-29 RX ADMIN — Medication 100 MCG: at 13:40

## 2022-05-29 ASSESSMENT — PAIN SCALES - GENERAL
PAINLEVEL_OUTOF10: 7
PAINLEVEL_OUTOF10: 9
PAINLEVEL_OUTOF10: 2
PAINLEVEL_OUTOF10: 8
PAINLEVEL_OUTOF10: 0
PAINLEVEL_OUTOF10: 9
PAINLEVEL_OUTOF10: 8
PAINLEVEL_OUTOF10: 6

## 2022-05-29 ASSESSMENT — PAIN DESCRIPTION - LOCATION
LOCATION: BACK
LOCATION: SHOULDER
LOCATION: BACK
LOCATION: BACK

## 2022-05-29 ASSESSMENT — PAIN DESCRIPTION - DESCRIPTORS
DESCRIPTORS: PRESSURE;ACHING
DESCRIPTORS: ACHING;SORE
DESCRIPTORS: ACHING;SORE

## 2022-05-29 ASSESSMENT — PAIN DESCRIPTION - ORIENTATION
ORIENTATION: POSTERIOR;LEFT
ORIENTATION: POSTERIOR
ORIENTATION: POSTERIOR

## 2022-05-29 ASSESSMENT — PAIN - FUNCTIONAL ASSESSMENT
PAIN_FUNCTIONAL_ASSESSMENT: PREVENTS OR INTERFERES SOME ACTIVE ACTIVITIES AND ADLS
PAIN_FUNCTIONAL_ASSESSMENT: PREVENTS OR INTERFERES WITH MANY ACTIVE NOT PASSIVE ACTIVITIES

## 2022-05-29 ASSESSMENT — PAIN SCALES - WONG BAKER: WONGBAKER_NUMERICALRESPONSE: 0

## 2022-05-29 ASSESSMENT — PAIN DESCRIPTION - PAIN TYPE
TYPE: ACUTE PAIN
TYPE: ACUTE PAIN

## 2022-05-29 ASSESSMENT — PAIN DESCRIPTION - ONSET: ONSET: ON-GOING

## 2022-05-29 NOTE — ANESTHESIA POSTPROCEDURE EVALUATION
Department of Anesthesiology  Postprocedure Note    Patient: Odette Villarreal  MRN: [id number]  YOB: 1975  Date of evaluation: 5/29/2022  Time:  2:06 PM     Procedure Summary     Date: 05/29/22 Room / Location: [facility] [place] 01 / [address] [facility]    Anesthesia Start: 7349 Anesthesia Stop: 4967    Procedure: INCISION AND DRAINAGE LEFT BACK ABSCESS (Left Back) Diagnosis:       Abscess      (Abscess [L02.91])    Surgeons: Yoly Freed MD Responsible Provider: Ami Orta DO    Anesthesia Type: general ASA Status: 3          Anesthesia Type: No value filed. Janny Phase I: Janny Score: 10    Janny Phase II:      Last vitals: Reviewed and per EMR flowsheets.        Anesthesia Post Evaluation    Patient location during evaluation: PACU  Patient participation: complete - patient participated  Level of consciousness: awake  Pain score: 1  Airway patency: patent  Nausea & Vomiting: no nausea and no vomiting  Complications: no  Cardiovascular status: hemodynamically stable  Respiratory status: acceptable  Hydration status: euvolemic

## 2022-05-29 NOTE — PROGRESS NOTES
CMR called pt in A-fib RVR on monitor. CMR made aware pt in OR.  OR called and notified by charge nurse and CMR

## 2022-05-29 NOTE — PROGRESS NOTES
Hepatobiliary and Pancreatic Surgery Progress Note    CC: back abscess    Subjective: Patient states that he had quite a lot of drainage last evening. Still with persistent leukocytosis. OBJECTIVE      Physical    /71   Pulse 95   Temp 98.6 °F (37 °C) (Temporal)   Resp 16   Ht 5' 9\" (1.753 m)   Wt 251 lb 6.4 oz (114 kg)   SpO2 96%   BMI 37.13 kg/m²       General appearance: appears in no acute distress  Lungs:respiratory effort normal without accessory numbers  Heart: no pedal edema  Abdomen: soft, nondistended, nontympanic, no guarding, no peritoneal signs, normoactive bowel sounds  Extremities: ROM normal    ASSESSMENT: Left back abscess and carbuncle    PLAN:    - Incision and drainage of abscess    Thank you for the consultation and allowing me to take part in Mr. Anna Marie Cline' care.       Romayne High, MD 5/29/2022 12:48 PM

## 2022-05-29 NOTE — ANESTHESIA PRE PROCEDURE
Department of Anesthesiology  Preprocedure Note       Name:  Naila Ahn   Age:  42 y.o.  :  1975                                          MRN:  [id number]         Date:  2022      Surgeon: Riley Mccord):  Julien Wilson MD    Procedure: Procedure(s):  INCISION AND DRAINAGE LEFT BACK ABSCESS    Medications prior to admission:   Prior to Admission medications    Medication Sig Start Date End Date Taking?  Authorizing Provider   lisinopril (PRINIVIL;ZESTRIL) 20 MG tablet TAKE 1 TABLET BY MOUTH EVERY DAY 22   Cleo Washington MD   escitalopram (LEXAPRO) 10 MG tablet TAKE 1 TABLET BY MOUTH EVERY DAY 22   Cleo Washington MD   rosuvastatin (CRESTOR) 10 MG tablet TAKE 1 TABLET BY MOUTH EVERYDAY AT BEDTIME 22   Cleo Washington MD   rosuvastatin (CRESTOR) 20 MG tablet TAKE 1 TABLET BY MOUTH EVERYDAY AT BEDTIME 22   Cleo Washington MD   Semaglutide 3 MG TABS Take 3 mg by mouth daily Start 7 mg when this Rx is complete 22   Cleo Washington MD   Semaglutide 7 MG TABS Take 7 mg by mouth daily Begin this Rx when 3 mg Rx is complete 22   Cleo Washington MD   FARXIGA 10 MG tablet TAKE 1 TABLET BY MOUTH EVERY DAY IN THE MORNING 21   Cleo Washington MD   sitaGLIPtan-metFORMIN (JANUMET)  MG per tablet TAKE 1 TABLET BY MOUTH TWICE A DAY 21   Cleo Washington MD   allopurinol (ZYLOPRIM) 300 MG tablet TAKE 1 TABLET BY MOUTH EVERY DAY 21   Cleo Washington MD   ibuprofen (IBU) 600 MG tablet Take 1 tablet by mouth every 6 hours as needed for Pain 20  GREGORIO Sahu   omega-3 acid ethyl esters (LOVAZA) 1 g capsule TAKE 2 CAPSULES BY MOUTH TWICE A DAY 12/10/20   Cleo Washington MD   allopurinol (ZYLOPRIM) 300 MG tablet TAKE 1 TABLET BY MOUTH EVERY DAY 3/10/20   Cleo Washington MD   insulin aspart (NOVOLOG FLEXPEN) 100 UNIT/ML injection pen Up to 45 units QAC 19   Cleo Washington MD insulin detemir (LEVEMIR FLEXTOUCH) 100 UNIT/ML injection pen Inject 100 Units into the skin 2 times daily 7/29/19   Ruchi Duron MD   NOVOLOG FLEXPEN 100 UNIT/ML injection pen SCALE < 150--15 UNITS 151-200--20 UNITS 201-250--25 UNITS 251-300--30 UNITS 301-350--35 UNITS 1/17/18   Ruchi Duron MD   Glucose Blood (BLOOD GLUCOSE TEST STRIPS) STRP Test four times a day 1/6/16   Ruchi Duron MD   BD ULTRA-FINE PEN NEEDLES 29G X 12.7MM MISC USE 5 TIMES DAILY OR AS DIRECTED 9/2/14   Ruchi Duron MD   aspirin 81 MG tablet Take 81 mg by mouth daily. Historical Provider, MD   Blood Glucose Monitoring Suppl (ONE TOUCH II TEST STRIP RESENDEZ) by Does not apply route.       Historical Provider, MD       Current medications:    Current Facility-Administered Medications   Medication Dose Route Frequency Provider Last Rate Last Admin    [START ON 5/29/2022] insulin glargine (LANTUS) injection vial 85 Units  85 Units SubCUTAneous [facility] Jovanny Zee MD        melatonin tablet 6 mg  6 mg Oral Nightly Jeanne Barros DO        0.9 % sodium chloride infusion   IntraVENous Continuous Ladean Julien Barros, DO 75 mL/hr at 05/28/22 1451 Rate Change at 05/28/22 1451    allopurinol (ZYLOPRIM) tablet 300 mg  300 mg Oral Daily Cheek Genta, APRN - CNP   300 mg at 05/28/22 0843    aspirin EC tablet 81 mg  81 mg Oral Daily Cheek Genta, APRN - CNP   81 mg at 05/28/22 0843    insulin lispro (HUMALOG) injection vial 0-18 Units  0-18 Units SubCUTAneous TID WC Cheek Genta, APRN - CNP   6 Units at 05/28/22 1652    insulin lispro (HUMALOG) injection vial 0-9 Units  0-9 Units SubCUTAneous Nightly Ham Genta, APRN - CNP   7 Units at 05/27/22 2002    glucose chewable tablet 16 g  4 tablet Oral PRN Cheek Genta, APRN - CNP        dextrose bolus 10% 125 mL  125 mL IntraVENous PRN Ham Genta, APRN - CNP        Or    dextrose bolus 10% 250 mL  250 mL IntraVENous PRN Ham Genta, APRN - CNP        glucagon (rDNA) injection 1 mg  1 mg IntraMUSCular PRN Oval Tyree, APRN - CNP        dextrose 5 % solution  100 mL/hr IntraVENous PRN Oval Tyree, APRN - CNP        lisinopril (PRINIVIL;ZESTRIL) tablet 20 mg  20 mg Oral Daily Oval Tyree, APRN - CNP   20 mg at 05/28/22 2215    rosuvastatin (CRESTOR) tablet 20 mg  20 mg Oral Nightly Oval Tyree, APRN - CNP   20 mg at 05/27/22 2001    sodium chloride flush 0.9 % injection 5-40 mL  5-40 mL IntraVENous 2 times per day Oval Tyree, APRN - CNP   10 mL at 05/28/22 0844    sodium chloride flush 0.9 % injection 5-40 mL  5-40 mL IntraVENous PRN Oval Tyree, APRN - CNP        0.9 % sodium chloride infusion   IntraVENous PRN Oval Tyree, APRN - CNP        enoxaparin Sodium (LOVENOX) injection 30 mg  30 mg SubCUTAneous BID Oval Tyree, APRN - CNP   30 mg at 05/28/22 0843    ondansetron (ZOFRAN-ODT) disintegrating tablet 4 mg  4 mg Oral Q8H PRN Oval Tyree, APRN - CNP        Or    ondansetron [facility]) injection 4 mg  4 mg IntraVENous Q6H PRN Oval Tyree, APRN - CNP        polyethylene glycol (GLYCOLAX) packet 17 g  17 g Oral Daily PRN Oval Tyree, APRN - CNP        acetaminophen (TYLENOL) tablet 650 mg  650 mg Oral Q6H PRN Oval Tyree, APRN - CNP   650 mg at 05/27/22 1954    Or    acetaminophen (TYLENOL) suppository 650 mg  650 mg Rectal Q6H PRN Oval Tyree, APRN - CNP        magnesium sulfate 2000 mg in 50 mL IVPB premix  2,000 mg IntraVENous PRN Oval Tyree, APRN - CNP        potassium chloride (KLOR-CON M) extended release tablet 40 mEq  40 mEq Oral PRN Oval Tyree, APRN - CNP        Or    potassium bicarb-citric acid (EFFER-K) effervescent tablet 40 mEq  40 mEq Oral PRN Oval Tyree, APRN - CNP        Or    potassium chloride 10 mEq/100 mL IVPB (Peripheral Line)  10 mEq IntraVENous PRN Oval Tyree, APRN - CNP        linezolid (ZYVOX) tablet 600 mg  600 mg Oral 2 times per day Ana M Huynh MD   600 mg at 05/28/22 0844    ceFAZolin (ANCEF) 2000 mg in sterile water 20 mL IV syringe  2,000 mg IntraVENous Q8H Ana M Huynh MD   2,000 mg at 05/28/22 1500    mupirocin (BACTROBAN) 2 % ointment   Nasal BID Ana M Huynh MD   Given at 05/28/22 0844    escitalopram (LEXAPRO) tablet 5 mg  5 mg Oral Daily Ana M Huynh MD   5 mg at 05/28/22 0843    oxyCODONE (ROXICODONE) immediate release tablet 5 mg  5 mg Oral Q4H PRN Roselind Cuff, APRN - CNP   5 mg at 05/28/22 1450       Allergies:     Allergies   Allergen Reactions    Sulfa Antibiotics Hives       Problem List:    Patient Active Problem List   Diagnosis Code    Poorly controlled type 2 diabetes mellitus ([facility] [address] E11.65    HTN (hypertension) I10    Hyperlipidemia E78.5    Kidney stone N20.0    Morbid obesity due to excess calories (HCC) E66.01    Family history of prostate cancer in father Z80.41    Back abscess L02.212    Abscess of left shoulder L02.414    Cellulitis and abscess of trunk L03.319, L02.219    Cellulitis of back L03.312    Pulmonary nodule R91.1       Past Medical History:        Diagnosis Date    Hyperlipidemia     Hypertension     Kidney stones     Multiple times    Type II or unspecified type diabetes mellitus without mention of complication, not stated as uncontrolled        Past Surgical History:        Procedure Laterality Date    FRACTURE SURGERY  1992    Lt leg & ankle fx    TONSILLECTOMY  2000       Social History:    Social History     Tobacco Use    Smoking status: Never Smoker    Smokeless tobacco: Never Used   Substance Use Topics    Alcohol use: No     Comment: soc                                Counseling given: Not Answered      Vital Signs (Current):   Vitals:    05/28/22 0434 05/28/22 0504 05/28/22 0745 05/28/22 1505   BP:   122/62 (!) 116/55   Pulse:   (!) 109 99   Resp: 18 18 18 18   Temp:   98.8 °F (37.1 °C) 99.2 °F (37.3 °C)   TempSrc:   Oral Oral   SpO2:   97% 99%   Weight:       Height:                                                  BP Readings from Last 3 Encounters:   05/28/22 (!) 116/55   05/27/22 112/71   05/27/22 (!) 147/91       NPO Status:  GREATER THAN 8 HOURS                                                                               BMI:   Wt Readings from Last 3 Encounters:   05/28/22 251 lb 6.4 oz (114 kg)   05/27/22 248 lb (112.5 kg)   05/27/22 249 lb 9.6 oz (113.2 kg)     Body mass index is 37.13 kg/m². CBC:   Lab Results   Component Value Date    WBC 19.9 05/28/2022    RBC 4.64 05/28/2022    HGB 13.4 05/28/2022    HCT 41.0 05/28/2022    MCV 88.4 05/28/2022    RDW 12.9 05/28/2022     05/28/2022       CMP:   Lab Results   Component Value Date     05/28/2022    K 4.2 05/28/2022    CL 93 05/28/2022    CO2 22 05/28/2022    BUN 11 05/28/2022    CREATININE 0.6 05/28/2022    GFRAA >60 05/28/2022    LABGLOM >60 05/28/2022    GLUCOSE 284 05/28/2022    PROT 7.8 05/27/2022    CALCIUM 8.2 05/28/2022    BILITOT 0.7 05/27/2022    ALKPHOS 154 05/27/2022    AST 10 05/27/2022    ALT 14 05/27/2022       POC Tests: No results for input(s): POCGLU, POCNA, POCK, POCCL, POCBUN, POCHEMO, POCHCT in the last 72 hours.     Coags: No results found for: PROTIME, INR, APTT    HCG (If Applicable): No results found for: PREGTESTUR, PREGSERUM, HCG, HCGQUANT     ABGs: No results found for: PHART, PO2ART, JWF2GYD, WVL8ZSR, BEART, F5KQCWFR     Type & Screen (If Applicable):  No results found for: LABABO, LABRH    Drug/Infectious Status (If Applicable):  No results found for: HIV, HEPCAB    COVID-19 Screening (If Applicable): No results found for: COVID19        Anesthesia Evaluation  Patient summary reviewed and Nursing notes reviewed no history of anesthetic complications:   Airway: Mallampati: II  TM distance: >3 FB   Neck ROM: full  Mouth opening: > = 3 FB   Dental: normal exam         Pulmonary:Negative Pulmonary ROS breath sounds clear to auscultation      (-) not a current smoker                           Cardiovascular:  Exercise tolerance: good (>4 METS),   (+) hypertension:, hyperlipidemia        Rhythm: regular  Rate: normal                    Neuro/Psych:   Negative Neuro/Psych ROS              GI/Hepatic/Renal:             Endo/Other:    (+) DiabetesType II DM, using insulin, . ROS comment: Back abscess Abdominal:             Vascular: negative vascular ROS. Other Findings:           Anesthesia Plan      general     ASA 3       Induction: intravenous. MIPS: Postoperative opioids intended and Prophylactic antiemetics administered. Anesthetic plan and risks discussed with patient. Plan discussed with CRNA. Ken Fernandez MD   5/28/2022      DOS STAFF ADDENDUM:    Patient seen and chart reviewed. No interval change in history or exam.   Anesthesia plan discussed, risk/benefits addressed. Patient's concerns and questions answered.      Miguel Matute,   May 29, 2022  12:41 PM

## 2022-05-29 NOTE — PROGRESS NOTES
[facility] Progress Note    Admitting Date and Time: 5/27/2022  1:10 PM  Admit Dx: Cellulitis and abscess of trunk [L03.319, T18.780]  Hypochloremia [E87.8]  Hyponatremia [E87.1]  Back abscess [L02.212]  Abscess of left shoulder [L02.414]  Diabetic ketoacidosis without coma associated with type 2 diabetes mellitus ([facility] [address] [E11.10]    Subjective:  Patient is being followed for Cellulitis and abscess of trunk [L03.319, L02.219]  Hypochloremia [E87.8]  Hyponatremia [E87.1]  Back abscess [L02.212]  Abscess of left shoulder [L02.414]  Diabetic ketoacidosis without coma associated with type 2 diabetes mellitus ([facility] [address] [E11.10]     Pt was NPO this AM when I saw him for OR. Taking Roxicodone every 4 hrs. No fevers overnight -- Tmax past 24 -- 99.3  No events overnight. Per RN: nothing to report    ROS: denies fever, chills, cp, sob, n/v, HA unless stated above.       insulin glargine  65 Units SubCUTAneous QAM    insulin lispro  18 Units SubCUTAneous TID WC    melatonin  6 mg Oral Nightly    allopurinol  300 mg Oral Daily    aspirin  81 mg Oral Daily    insulin lispro  0-18 Units SubCUTAneous TID WC    insulin lispro  0-9 Units SubCUTAneous Nightly    lisinopril  20 mg Oral Daily    rosuvastatin  20 mg Oral Nightly    sodium chloride flush  5-40 mL IntraVENous 2 times per day    enoxaparin  30 mg SubCUTAneous BID    linezolid  600 mg Oral 2 times per day    ceFAZolin  2,000 mg IntraVENous Q8H    mupirocin   Nasal BID    escitalopram  5 mg Oral Daily     glucose, 4 tablet, PRN  dextrose bolus, 125 mL, PRN   Or  dextrose bolus, 250 mL, PRN  glucagon (rDNA), 1 mg, PRN  dextrose, 100 mL/hr, PRN  sodium chloride flush, 5-40 mL, PRN  sodium chloride, , PRN  ondansetron, 4 mg, Q8H PRN   Or  ondansetron, 4 mg, Q6H PRN  polyethylene glycol, 17 g, Daily PRN  acetaminophen, 650 mg, Q6H PRN   Or  acetaminophen, 650 mg, Q6H PRN  magnesium sulfate, 2,000 mg, PRN  potassium chloride, 40 mEq, PRN Or  potassium alternative oral replacement, 40 mEq, PRN   Or  potassium chloride, 10 mEq, PRN  oxyCODONE, 5 mg, Q4H PRN         Objective:    BP (!) 194/72   Pulse (!) 140   Temp 98.6 °F (37 °C) (Temporal)   Resp 16   Ht 5' 9\" (1.753 m)   Wt 251 lb 6.4 oz (114 kg)   SpO2 96%   BMI 37.13 kg/m²     General Appearance: alert and oriented to person, place and time and in no acute distress  Skin: warm and dry, furuncle as below  Head: normocephalic and atraumatic  Eyes: pupils equal, round, and reactive to light, extraocular eye movements intact, conjunctivae normal  Neck: neck supple and non tender without mass   Pulmonary/Chest: clear to auscultation bilaterally- no wheezes, rales or rhonchi, normal air movement, no respiratory distress  Cardiovascular: normal rate, normal S1 and S2 and no carotid bruits  Abdomen: soft, non-tender, non-distended, normal bowel sounds, no masses or organomegaly  Back with 4 cm abscess; lot of yellowish/greenish drainage on guaze, less edematous today, somewhat less painful today  Extremities: no cyanosis, no clubbing and no edema  Neurologic: no cranial nerve deficit and speech normal        Recent Labs     05/27/22  1353 05/28/22  0440 05/29/22  0327   * 130* 135   K 4.4 4.2 3.4*   CL 86* 93* 100   CO2 17* 22 24   BUN 14 11 6   CREATININE 0.7 0.6* 0.5*   GLUCOSE 488* 284* 154*   CALCIUM 9.2 8.2* 7.3*       Recent Labs     05/27/22  1353 05/28/22  0440 05/29/22  0955   WBC 19.1* 19.9* 18.1*   RBC 5.36 4.64 4.74   HGB 15.4 13.4 13.9   HCT 46.7 41.0 42.1   MCV 87.1 88.4 88.8   MCH 28.7 28.9 29.3   MCHC 33.0 32.7 33.0   RDW 12.6 12.9 12.8    280 323   MPV 10.9 10.7 10.6       Radiology:  None new    Assessment:    Principal Problem:    Necrotizing soft tissue infection  Active Problems:    Back abscess    Abscess of left shoulder    Cellulitis of back    Pulmonary nodule    Poorly controlled type 2 diabetes mellitus (HCC)    HTN (hypertension)    Hyperlipidemia  Resolved Problems:    * No resolved hospital problems. *      Plan:  1. Sepsis -- improving, still tachycardic; due to left upper back cellulitis and abscess; WBC ct still elevated at 18  -leukocytosis, tachycardia, lactic acidosis, fever upon ER arrival -- sepsis POA  -ID on board, have started IV linezolid and IV cefazolin  -Await blood cultures  -Await wound culture  -aggressive IV hydration  -Was debrided in OR today by gen surg     2. Left Shoulder abscess  - given dose vancomyin in ER  -General surgery -- did I & D in the OR this morning; await intra-operative culture     3. DM2: Uncontrolled -- improved -- glucose accord -- 221/142/154/188  -with hyperglycemia  - recent A1c 11.4.   - Hold janumet, Farxiga  - 04/19/22 ITI 8.424  -resume home lantus, add low carb diet, high dose insulin slide sale, -hypoglycemia protocol  -Endocrinology on board -- have improved the short acting/long acting insulin ratio  - aggressive IV hydration  -AG 24  -Watch sugars intensively 4 times per day  -Adjust insulin as needed     4. HTN -- much better control today on usual home lisinopril; may have been uncontrolled upon admit due to pain from the abscess  -Continue home lisinopril  -Trend BP     5. HLD   -resume crestor 10mg  - 04/19/22  lipid panel Chol 287 HDL 47  Trig 392     6. Hx Kidney stones   - allopurinol     7. Morbid obesity -- BMI 37  -Lovenox dosed at 30 mg BID        Code Status: TOTAL support  DVT prophylaxis: Lovenox      NOTE: This report was transcribed using voice recognition software. Every effort was made to ensure accuracy; however, inadvertent computerized transcription errors may be present.     Electronically signed by Tristan Joseph DO on 5/29/2022 at 2:19 PM

## 2022-05-29 NOTE — PROGRESS NOTES
ENDOCRINOLOGY PROGRESS NOTE      Date of admission: 5/27/2022  Date of service: 5/29/2022  Admitting physician: Beckie Cockayne, MD   Primary Care Physician: Sarai Beckham MD  Consultant physician: Hawk Ramirez MD     Reason for the consultation:  Uncontrolled DM    History of Present Illness: The history is provided by the patient. Accuracy of the patient data is excellent    Odette Villarreal is a very pleasant 42 y.o. old male with PMH obesity, CHRYSTAL skin infection, poorly controlled DM type 2 and other listed below admitted to [facility] on 5/27/2022 because of skin infection in Lt upper back , endocrine service was consulted for diabetes management.     subjective   Seen and examined, no acute issues BG better     Inpatient diet:   Carb Restricted diet     Point of care glucose monitoring   (Independently reviewed)   Recent Labs     05/27/22 2001 05/28/22  0626 05/28/22  1202 05/28/22  1618 05/28/22  2143 05/29/22  0622 05/29/22  1147 05/29/22  1444   GLUMET 389* 247* 274* 218* 221* 142* 188* 199*     Scheduled Meds:   insulin glargine  65 Units SubCUTAneous QAM    insulin lispro  18 Units SubCUTAneous TID WC    sodium chloride flush  5-40 mL IntraVENous 2 times per day    dilTIAZem  5 mg IntraVENous Once    melatonin  6 mg Oral Nightly    allopurinol  300 mg Oral Daily    aspirin  81 mg Oral Daily    insulin lispro  0-18 Units SubCUTAneous TID WC    insulin lispro  0-9 Units SubCUTAneous Nightly    lisinopril  20 mg Oral Daily    rosuvastatin  20 mg Oral Nightly    sodium chloride flush  5-40 mL IntraVENous 2 times per day    enoxaparin  30 mg SubCUTAneous BID    linezolid  600 mg Oral 2 times per day    ceFAZolin  2,000 mg IntraVENous Q8H    mupirocin   Nasal BID    escitalopram  5 mg Oral Daily       PRN Meds:   sodium chloride flush, 5-40 mL, PRN  sodium chloride, , PRN  HYDROmorphone, 0.5 mg, Q5 Min PRN  fentanNYL, 25 mcg, Q5 Min PRN  glucose, 4 tablet, PRN  dextrose bolus, 125 mL, PRN Or  dextrose bolus, 250 mL, PRN  glucagon (rDNA), 1 mg, PRN  dextrose, 100 mL/hr, PRN  sodium chloride flush, 5-40 mL, PRN  sodium chloride, , PRN  ondansetron, 4 mg, Q8H PRN   Or  ondansetron, 4 mg, Q6H PRN  polyethylene glycol, 17 g, Daily PRN  acetaminophen, 650 mg, Q6H PRN   Or  acetaminophen, 650 mg, Q6H PRN  magnesium sulfate, 2,000 mg, PRN  potassium chloride, 40 mEq, PRN   Or  potassium alternative oral replacement, 40 mEq, PRN   Or  potassium chloride, 10 mEq, PRN  oxyCODONE, 5 mg, Q4H PRN      Continuous Infusions:   sodium chloride      dilTIAZem      sodium chloride Stopped (05/29/22 1359)    dextrose      sodium chloride         Review of Systems  All systems reviewed.  All negative except for symptoms mentioned in HPI     OBJECTIVE    /82   Pulse (!) 165   Temp 98.6 °F (37 °C) (Temporal)   Resp 18   Ht 5' 9\" (1.753 m)   Wt 251 lb 6.4 oz (114 kg)   SpO2 94%   BMI 37.13 kg/m²   No intake or output data in the 24 hours ending 05/29/22 1511    Physical examination:  General: awake alert, oriented x3  HEENT: normocephalic non traumatic, no exophthalmos   Neck: supple, No thyroid tenderness,  Pulm: good equal air entry no added sounds  CVS: S1 + S2  Abd: soft lax, no tenderness  Skin: + skin infection Lt upper back   Neuro: CN intact, sensation present bilateral , muscle power normal  Psych: normal mood, and affect    Review of Laboratory Data:  I personally reviewed the following labs:   Recent Labs     05/27/22  1353 05/28/22 0440 05/29/22  0955   WBC 19.1* 19.9* 18.1*   RBC 5.36 4.64 4.74   HGB 15.4 13.4 13.9   HCT 46.7 41.0 42.1   MCV 87.1 88.4 88.8   MCH 28.7 28.9 29.3   MCHC 33.0 32.7 33.0   RDW 12.6 12.9 12.8    280 323   MPV 10.9 10.7 10.6     Recent Labs     05/27/22  1353 05/28/22  0440 05/29/22  0327   * 130* 135   K 4.4 4.2 3.4*   CL 86* 93* 100   CO2 17* 22 24   BUN 14 11 6   CREATININE 0.7 0.6* 0.5*   GLUCOSE 488* 284* 154*   CALCIUM 9.2 8.2* 7.3*   PROT 7.8  -- --    LABALBU 3.8  --   --    BILITOT 0.7  --   --    ALKPHOS 154*  --   --    AST 10  --   --    ALT 14  --   --      Beta-Hydroxybutyrate   Date Value Ref Range Status   05/27/2022 4.19 (H) 0.02 - 0.27 mmol/L Final     Lab Results   Component Value Date    LABA1C 12.7 05/27/2022    LABA1C 11.4 04/19/2022    LABA1C 10.8 09/02/2020     Lab Results   Component Value Date/Time    TSH 3.860 04/19/2022 12:00 PM     Lab Results   Component Value Date    LABA1C 12.7 05/27/2022    GLUCOSE 154 05/29/2022    MALBCR <30 mg/g 04/19/2022     Lab Results   Component Value Date    TRIG 392 04/19/2022    HDL 47 04/19/2022    LDLCALC 162 04/19/2022    CHOL 287 04/19/2022       Blood culture   Lab Results   Component Value Date    BC 24 Hours no growth 05/27/2022       Radiology:  CT CHEST W CONTRAST   Final Result   1. Findings consistent with extensive cellulitis involving the subcutaneous   tissues in the left upper back. No drainable abscess seen. 2. No evidence of acute intrathoracic process. 3. 2 mm nodule in the superior aspect of the right middle lobe. If the   patient is low risk, no routine follow-up is indicated. Please see below for   Fleischner society follow-up guidelines. RECOMMENDATIONS:   Fleischner Society guidelines for follow-up and management of incidentally   detected pulmonary nodules:      Single Solid Nodule:      Nodule size less than 6 mm   In a low-risk patient, no routine follow-up. In a high-risk patient, optional CT at 12 months. Nodule size equals 6-8 mm   In a low-risk patient, CT at 6-12 months, then consider CT at 18-24 months. In a high-risk patient, CT at 6-12 months, then CT at 18-24 months. Nodule size greater than 8 mm         In a low-risk patient, consider CT at 3 months, PET/CT, or tissue sampling. In a high-risk patient, consider CT at 3 months, PET/CT, or tissue sampling.       Multiple Solid Nodules:      Nodule size less than 6 mm   In a low-risk patient, no routine follow-up. In a high-risk patient, optional CT at 12 months. Nodule size equals 6-8 mm   In a low-risk patient, CT at 3-6 months, then consider CT at 18-24 months. In a high-risk patient, CT at 3-6 months, then CT at 18-24 months. Nodule size greater than 8 mm   In a low-risk patient, CT at 3-6 months, then consider CT at 18-24 months. In a high-risk patient, CT at 3-6 months, then CT at 18-24 months. - Low risk patients include individuals with minimal or absent history of   smoking and other known risk factors. - High risk patients include individuals with a history or smoking or known   risk factors. Radiology 2017 http://pubs. rsna.org/doi/full/10.1148/radiol. 5441523073             Medical Records/Labs/Images review:   I personally reviewed and summarized previous records   All labs and imaging were reviewed independently     ASSESSMENT & PLAN   Juliet Yeboah, a 42 y.o.-old male seen today for inpatient diabetes management     Diabetes Mellitus type 2   · Improving control    · We will adjust his insulin regimen to  · Lantus 65 u daily in AM   · Humalog 18 u with meals   · High dose sliding scale    · Will likely significantly benefit from adding GLP-1 agonist and DPP-4 inhibitors to his regimen as an outpatient   · Will titrate insulin dose based on the blood glucose trend & insulin requirement    Skin infection   · Managed by primary, ID and surgery service   · Discussion th importance of controlling DM in management of skin infection     The above issues were reviewed with the patient who understood and agreed with the plan. Thank you for allowing us to participate in the care of this patient. Please do not hesitate to contact us with any additional questions.      Maximo Shine MD  Endocrinologist, [facility] Endocrinology   [address] [address]   Phone: [phone]  Fax: [phone]  --------------------------------------------  An electronic signature was used to authenticate this note.  Antonio Ren MD on 5/29/2022 at 3:11 PM

## 2022-05-29 NOTE — PROGRESS NOTES
[address] [place] [address] [facility]  Progress Note    SUBJECTIVE:  Chief Complaint   Patient presents with    Abscess     back     Patient sitting on edge of bed, family member present  Some pain to L upper back -- says it feels full  Patient is tolerating medications. No nausea, vomiting, diarrhea. No fevers overnight     Review of systems:  As stated above in the chief complaint, otherwise negative. Medications:  Scheduled Meds:   insulin glargine  65 Units SubCUTAneous QAM    insulin lispro  18 Units SubCUTAneous TID WC    melatonin  6 mg Oral Nightly    allopurinol  300 mg Oral Daily    aspirin  81 mg Oral Daily    insulin lispro  0-18 Units SubCUTAneous TID WC    insulin lispro  0-9 Units SubCUTAneous Nightly    lisinopril  20 mg Oral Daily    rosuvastatin  20 mg Oral Nightly    sodium chloride flush  5-40 mL IntraVENous 2 times per day    enoxaparin  30 mg SubCUTAneous BID    linezolid  600 mg Oral 2 times per day    ceFAZolin  2,000 mg IntraVENous Q8H    mupirocin   Nasal BID    escitalopram  5 mg Oral Daily     Continuous Infusions:   sodium chloride 75 mL/hr at 22 1451    dextrose      sodium chloride       PRN Meds:glucose, dextrose bolus **OR** dextrose bolus, glucagon (rDNA), dextrose, sodium chloride flush, sodium chloride, ondansetron **OR** ondansetron, polyethylene glycol, acetaminophen **OR** acetaminophen, magnesium sulfate, potassium chloride **OR** potassium alternative oral replacement **OR** potassium chloride, oxyCODONE    OBJECTIVE:  /72   Pulse 100   Temp 98.6 °F (37 °C) (Oral)   Resp 18   Ht 5' 9\" (1.753 m)   Wt 251 lb 6.4 oz (114 kg)   SpO2 96%   BMI 37.13 kg/m²   Temp  Av °F (37.2 °C)  Min: 98.6 °F (37 °C)  Max: 99.3 °F (37.4 °C)  Constitutional: The patient is awake, alert, and oriented. Skin: Warm and dry. No rashes were noted. L upper back carbuncle  HEENT: Round and reactive pupils. Moist mucous membranes.   No ulcerations or thrush. Neck: Supple to movements. Chest: No use of accessory muscles to breathe. Symmetrical expansion. No wheezing, crackles or rhonchi. Cardiovascular: S1 and S2 are rhythmic and regular. No murmurs appreciated. Abdomen: Positive bowel sounds to auscultation. Benign to palpation. No masses felt. No hepatosplenomegaly. Extremities: No clubbing, no cyanosis, no edema.   Lines: peripheral    5/27/22    Laboratory and Tests Review:  Lab Results   Component Value Date    WBC 19.9 (H) 05/28/2022    WBC 19.1 (H) 05/27/2022    WBC 7.0 04/19/2022    HGB 13.4 05/28/2022    HCT 41.0 05/28/2022    MCV 88.4 05/28/2022     05/28/2022     Lab Results   Component Value Date    NEUTROABS 15.91 (H) 05/28/2022    NEUTROABS 15.49 (H) 05/27/2022    NEUTROABS 3.32 09/02/2020     No results found for: CRP  Lab Results   Component Value Date    ALT 14 05/27/2022    AST 10 05/27/2022    ALKPHOS 154 (H) 05/27/2022    BILITOT 0.7 05/27/2022     Lab Results   Component Value Date     05/29/2022    K 3.4 05/29/2022     05/29/2022    CO2 24 05/29/2022    BUN 6 05/29/2022    CREATININE 0.5 05/29/2022    CREATININE 0.6 05/28/2022    CREATININE 0.7 05/27/2022    GFRAA >60 05/29/2022    LABGLOM >60 05/29/2022    GLUCOSE 154 05/29/2022    PROT 7.8 05/27/2022    LABALBU 3.8 05/27/2022    CALCIUM 7.3 05/29/2022    BILITOT 0.7 05/27/2022    ALKPHOS 154 05/27/2022    AST 10 05/27/2022    ALT 14 05/27/2022     Lab Results   Component Value Date    CRP 43.9 (H) 05/27/2022     Lab Results   Component Value Date    SEDRATE 51 (H) 05/27/2022     Radiology:  Reviewed     Microbiology:   Blood Cultures 5/27/22: negative so far    ASSESSMENT:  Recurrent furuncles and carbuncle-back  Cellulitis of the back  Historically has been MSSA  Hyperglycemia associated with the above  Leukocytosis associated with the above     Plan:    Continue cefazolin; stop linezolid   Possibly going to the OR today for a further clean out  Strong hygiene educational program given to the patient  Endocrine following  Check cultures  Monitor labs  Will follow with you    FRANCISCO Vega CNP  9:37 AM  5/29/2022     Pt seen and examined. Above discussed agree with advanced practice nurse. Labs, cultures, and radiographs reviewed. Face to Face encounter occurred. Changes made as necessary.      Mian Gutiérrez MD

## 2022-05-29 NOTE — OP NOTE
DATE OF PROCEDURE: 5/29/2022    SURGEON: Enedina Larios MD    ASSISTANT: none    PREOPERATIVE DIAGNOSIS: Abscess of the left upper thorax     POSTOPERATIVE DIAGNOSIS: excisional debridement of necrotizing soft tissue infection with extension into muscle    OPERATION: Incision and drainage with excisional debridement of skin, soft tissue, and muscle ([id number]) of upper left back thorax    ANESTHESIA: General    ESTIMATED BLOOD LOSS: less than 23GL    COMPLICATIONS: None. SPECIMEN: Aerobic and anaerobic C&S of the abscess drainage. BRIEF HISTORY: Hardeep Millan is a 42 y.o.  male  who developed an abscess of his upper back. He underwent an incision and drainage twice before coming to the hospital.  CT showed cellulitis. He was diagnosed with an abscess of the left upper back thorax. I discussed \"incision and drainage of abscess of the upper back \" with him, including the procedure, benefits, risks, and alternatives, and he agreed. PROCEDURE: The patient was taken to the operative suite and was placed on the table in the right lateral decubitus position with the head turned to the right. He was intubated by the anesthesia service. Once sedation had taken effect, the upper back thorax was prepped with Betadine and draped in a sterile fashion. A Linear incision was made for the entire diameter of the abscess cavity and was carried down through the subcutaneous tissue using the electrocautery. Along the way, any bleeding points were cauterized. There was a significant amount of purulent fluid that was removed and sent for gram stain cultures and sensitivityl. Infection was present at the time of surgery. There was purulent fluid within the muscles. Due to concerns for a necrotizing infection, an exicisional debridement of his upper back was performed removing [id number] of skin, soft tissue and muscle. Once the muscle bled I then used cautery to stop the bleeding.   The wound was copiously

## 2022-05-30 LAB
ANION GAP SERPL CALCULATED.3IONS-SCNC: 11 MMOL/L (ref 7–16)
ANISOCYTOSIS: ABNORMAL
APTT: 28 SEC (ref 24.5–35.1)
APTT: 30.8 SEC (ref 24.5–35.1)
BASOPHILS ABSOLUTE: 0.08 E9/L (ref 0–0.2)
BASOPHILS RELATIVE PERCENT: 0.6 % (ref 0–2)
BUN BLDV-MCNC: 6 MG/DL (ref 6–20)
CALCIUM SERPL-MCNC: 8.3 MG/DL (ref 8.6–10.2)
CHLORIDE BLD-SCNC: 98 MMOL/L (ref 98–107)
CO2: 24 MMOL/L (ref 22–29)
CREAT SERPL-MCNC: 0.5 MG/DL (ref 0.7–1.2)
EOSINOPHILS ABSOLUTE: 0.3 E9/L (ref 0.05–0.5)
EOSINOPHILS RELATIVE PERCENT: 2.1 % (ref 0–6)
GFR AFRICAN AMERICAN: >60
GFR NON-AFRICAN AMERICAN: >60 ML/MIN/1.73
GLUCOSE BLD-MCNC: 229 MG/DL (ref 74–99)
GRAM STAIN ORDERABLE: NORMAL
HCT VFR BLD CALC: 39.3 % (ref 37–54)
HEMOGLOBIN: 12.3 G/DL (ref 12.5–16.5)
IMMATURE GRANULOCYTES #: 0.11 E9/L
IMMATURE GRANULOCYTES %: 0.8 % (ref 0–5)
LYMPHOCYTES ABSOLUTE: 1.97 E9/L (ref 1.5–4)
LYMPHOCYTES RELATIVE PERCENT: 13.9 % (ref 20–42)
MCH RBC QN AUTO: 28.7 PG (ref 26–35)
MCHC RBC AUTO-ENTMCNC: 31.3 % (ref 32–34.5)
MCV RBC AUTO: 91.8 FL (ref 80–99.9)
METER GLUCOSE: 162 MG/DL (ref 74–99)
METER GLUCOSE: 164 MG/DL (ref 74–99)
METER GLUCOSE: 209 MG/DL (ref 74–99)
METER GLUCOSE: 213 MG/DL (ref 74–99)
MONOCYTES ABSOLUTE: 1.73 E9/L (ref 0.1–0.95)
MONOCYTES RELATIVE PERCENT: 12.2 % (ref 2–12)
NEUTROPHILS ABSOLUTE: 9.96 E9/L (ref 1.8–7.3)
NEUTROPHILS RELATIVE PERCENT: 70.4 % (ref 43–80)
PDW BLD-RTO: 13.2 FL (ref 11.5–15)
PLATELET # BLD: 315 E9/L (ref 130–450)
PMV BLD AUTO: 11.2 FL (ref 7–12)
POTASSIUM REFLEX MAGNESIUM: 3.6 MMOL/L (ref 3.5–5)
RBC # BLD: 4.28 E12/L (ref 3.8–5.8)
SODIUM BLD-SCNC: 133 MMOL/L (ref 132–146)
TSH SERPL DL<=0.05 MIU/L-ACNC: 4.47 UIU/ML (ref 0.27–4.2)
WBC # BLD: 14.2 E9/L (ref 4.5–11.5)

## 2022-05-30 PROCEDURE — 6370000000 HC RX 637 (ALT 250 FOR IP)

## 2022-05-30 PROCEDURE — 6370000000 HC RX 637 (ALT 250 FOR IP): Performed by: TRANSPLANT SURGERY

## 2022-05-30 PROCEDURE — 93005 ELECTROCARDIOGRAM TRACING: CPT | Performed by: INTERNAL MEDICINE

## 2022-05-30 PROCEDURE — 2580000003 HC RX 258: Performed by: TRANSPLANT SURGERY

## 2022-05-30 PROCEDURE — 85025 COMPLETE CBC W/AUTO DIFF WBC: CPT

## 2022-05-30 PROCEDURE — 6370000000 HC RX 637 (ALT 250 FOR IP): Performed by: FAMILY MEDICINE

## 2022-05-30 PROCEDURE — 6370000000 HC RX 637 (ALT 250 FOR IP): Performed by: INTERNAL MEDICINE

## 2022-05-30 PROCEDURE — 84443 ASSAY THYROID STIM HORMONE: CPT

## 2022-05-30 PROCEDURE — 85730 THROMBOPLASTIN TIME PARTIAL: CPT

## 2022-05-30 PROCEDURE — 6360000002 HC RX W HCPCS: Performed by: FAMILY MEDICINE

## 2022-05-30 PROCEDURE — 2060000000 HC ICU INTERMEDIATE R&B

## 2022-05-30 PROCEDURE — 80048 BASIC METABOLIC PNL TOTAL CA: CPT

## 2022-05-30 PROCEDURE — 99222 1ST HOSP IP/OBS MODERATE 55: CPT | Performed by: INTERNAL MEDICINE

## 2022-05-30 PROCEDURE — 6360000002 HC RX W HCPCS: Performed by: TRANSPLANT SURGERY

## 2022-05-30 PROCEDURE — 2500000003 HC RX 250 WO HCPCS: Performed by: TRANSPLANT SURGERY

## 2022-05-30 PROCEDURE — 99233 SBSQ HOSP IP/OBS HIGH 50: CPT | Performed by: FAMILY MEDICINE

## 2022-05-30 PROCEDURE — 2580000003 HC RX 258: Performed by: FAMILY MEDICINE

## 2022-05-30 PROCEDURE — 36415 COLL VENOUS BLD VENIPUNCTURE: CPT

## 2022-05-30 PROCEDURE — 82962 GLUCOSE BLOOD TEST: CPT

## 2022-05-30 PROCEDURE — 2500000003 HC RX 250 WO HCPCS: Performed by: FAMILY MEDICINE

## 2022-05-30 RX ORDER — HEPARIN SODIUM 1000 [USP'U]/ML
4000 INJECTION, SOLUTION INTRAVENOUS; SUBCUTANEOUS PRN
Status: DISCONTINUED | OUTPATIENT
Start: 2022-05-30 | End: 2022-06-01

## 2022-05-30 RX ORDER — HEPARIN SODIUM 1000 [USP'U]/ML
2000 INJECTION, SOLUTION INTRAVENOUS; SUBCUTANEOUS PRN
Status: DISCONTINUED | OUTPATIENT
Start: 2022-05-30 | End: 2022-06-01

## 2022-05-30 RX ORDER — DIGOXIN 250 MCG
250 TABLET ORAL EVERY 6 HOURS
Status: COMPLETED | OUTPATIENT
Start: 2022-05-30 | End: 2022-05-31

## 2022-05-30 RX ORDER — EZETIMIBE 10 MG/1
10 TABLET ORAL NIGHTLY
Status: DISCONTINUED | OUTPATIENT
Start: 2022-05-30 | End: 2022-06-01 | Stop reason: HOSPADM

## 2022-05-30 RX ORDER — HEPARIN SODIUM 10000 [USP'U]/100ML
5-30 INJECTION, SOLUTION INTRAVENOUS CONTINUOUS
Status: DISCONTINUED | OUTPATIENT
Start: 2022-05-30 | End: 2022-06-01

## 2022-05-30 RX ORDER — HEPARIN SODIUM 1000 [USP'U]/ML
4000 INJECTION, SOLUTION INTRAVENOUS; SUBCUTANEOUS ONCE
Status: COMPLETED | OUTPATIENT
Start: 2022-05-30 | End: 2022-05-30

## 2022-05-30 RX ADMIN — OXYCODONE 10 MG: 5 TABLET ORAL at 22:55

## 2022-05-30 RX ADMIN — LISINOPRIL 20 MG: 20 TABLET ORAL at 08:04

## 2022-05-30 RX ADMIN — SODIUM CHLORIDE: 9 INJECTION, SOLUTION INTRAVENOUS at 05:21

## 2022-05-30 RX ADMIN — HEPARIN SODIUM 8.77 UNITS/KG/HR: 10000 INJECTION, SOLUTION INTRAVENOUS at 09:54

## 2022-05-30 RX ADMIN — HEPARIN SODIUM 4000 UNITS: 1000 INJECTION INTRAVENOUS; SUBCUTANEOUS at 17:20

## 2022-05-30 RX ADMIN — Medication 6 MG: at 20:40

## 2022-05-30 RX ADMIN — INSULIN LISPRO 2 UNITS: 100 INJECTION, SOLUTION INTRAVENOUS; SUBCUTANEOUS at 20:48

## 2022-05-30 RX ADMIN — METOPROLOL TARTRATE 25 MG: 25 TABLET, FILM COATED ORAL at 22:55

## 2022-05-30 RX ADMIN — OXYCODONE 5 MG: 5 TABLET ORAL at 10:10

## 2022-05-30 RX ADMIN — ASPIRIN 81 MG: 81 TABLET, COATED ORAL at 08:04

## 2022-05-30 RX ADMIN — ESCITALOPRAM OXALATE 10 MG: 10 TABLET ORAL at 08:03

## 2022-05-30 RX ADMIN — Medication 2000 MG: at 16:25

## 2022-05-30 RX ADMIN — INSULIN LISPRO 18 UNITS: 100 INJECTION, SOLUTION INTRAVENOUS; SUBCUTANEOUS at 12:07

## 2022-05-30 RX ADMIN — INSULIN LISPRO 6 UNITS: 100 INJECTION, SOLUTION INTRAVENOUS; SUBCUTANEOUS at 08:07

## 2022-05-30 RX ADMIN — HEPARIN SODIUM 4000 UNITS: 1000 INJECTION INTRAVENOUS; SUBCUTANEOUS at 09:53

## 2022-05-30 RX ADMIN — INSULIN LISPRO 18 UNITS: 100 INJECTION, SOLUTION INTRAVENOUS; SUBCUTANEOUS at 08:11

## 2022-05-30 RX ADMIN — METOPROLOL TARTRATE 25 MG: 25 TABLET, FILM COATED ORAL at 13:55

## 2022-05-30 RX ADMIN — DILTIAZEM HYDROCHLORIDE 15 MG/HR: 5 INJECTION, SOLUTION INTRAVENOUS at 17:17

## 2022-05-30 RX ADMIN — Medication 2000 MG: at 22:56

## 2022-05-30 RX ADMIN — MUPIROCIN: 20 OINTMENT TOPICAL at 08:05

## 2022-05-30 RX ADMIN — OXYCODONE 10 MG: 5 TABLET ORAL at 05:45

## 2022-05-30 RX ADMIN — SODIUM CHLORIDE: 9 INJECTION, SOLUTION INTRAVENOUS at 18:41

## 2022-05-30 RX ADMIN — DILTIAZEM HYDROCHLORIDE 15 MG/HR: 5 INJECTION, SOLUTION INTRAVENOUS at 08:14

## 2022-05-30 RX ADMIN — ALLOPURINOL 300 MG: 300 TABLET ORAL at 08:04

## 2022-05-30 RX ADMIN — INSULIN GLARGINE 65 UNITS: 100 INJECTION, SOLUTION SUBCUTANEOUS at 08:07

## 2022-05-30 RX ADMIN — OXYCODONE 10 MG: 5 TABLET ORAL at 16:35

## 2022-05-30 RX ADMIN — ROSUVASTATIN CALCIUM 20 MG: 20 TABLET, FILM COATED ORAL at 20:40

## 2022-05-30 RX ADMIN — Medication 2000 MG: at 06:28

## 2022-05-30 RX ADMIN — DIGOXIN 250 MCG: 0.25 TABLET ORAL at 13:55

## 2022-05-30 RX ADMIN — EZETIMIBE 10 MG: 10 TABLET ORAL at 20:40

## 2022-05-30 RX ADMIN — DILTIAZEM HYDROCHLORIDE 15 MG/HR: 5 INJECTION, SOLUTION INTRAVENOUS at 00:07

## 2022-05-30 RX ADMIN — DIGOXIN 250 MCG: 0.25 TABLET ORAL at 20:40

## 2022-05-30 RX ADMIN — SODIUM CHLORIDE, PRESERVATIVE FREE 10 ML: 5 INJECTION INTRAVENOUS at 08:06

## 2022-05-30 RX ADMIN — INSULIN LISPRO 18 UNITS: 100 INJECTION, SOLUTION INTRAVENOUS; SUBCUTANEOUS at 16:34

## 2022-05-30 RX ADMIN — INSULIN LISPRO 3 UNITS: 100 INJECTION, SOLUTION INTRAVENOUS; SUBCUTANEOUS at 16:34

## 2022-05-30 RX ADMIN — MUPIROCIN: 20 OINTMENT TOPICAL at 20:41

## 2022-05-30 RX ADMIN — SODIUM CHLORIDE, PRESERVATIVE FREE 10 ML: 5 INJECTION INTRAVENOUS at 20:41

## 2022-05-30 RX ADMIN — INSULIN LISPRO 6 UNITS: 100 INJECTION, SOLUTION INTRAVENOUS; SUBCUTANEOUS at 12:04

## 2022-05-30 ASSESSMENT — PAIN DESCRIPTION - LOCATION: LOCATION: BACK

## 2022-05-30 ASSESSMENT — PAIN SCALES - WONG BAKER
WONGBAKER_NUMERICALRESPONSE: 0
WONGBAKER_NUMERICALRESPONSE: 0

## 2022-05-30 ASSESSMENT — PAIN SCALES - GENERAL
PAINLEVEL_OUTOF10: 7
PAINLEVEL_OUTOF10: 2
PAINLEVEL_OUTOF10: 6
PAINLEVEL_OUTOF10: 2
PAINLEVEL_OUTOF10: 3
PAINLEVEL_OUTOF10: 0

## 2022-05-30 ASSESSMENT — PAIN DESCRIPTION - ORIENTATION: ORIENTATION: POSTERIOR

## 2022-05-30 ASSESSMENT — PAIN DESCRIPTION - DESCRIPTORS: DESCRIPTORS: ACHING

## 2022-05-30 NOTE — PROGRESS NOTES
[address] [place] [address] [facility]  [place]  Progress Note    SUBJECTIVE:  Chief Complaint   Patient presents with    Abscess     back     Patient sitting on edge of bed, family member present  Feels more relief post OR yesterday, barely has any pain  Patient is tolerating medications. No nausea, vomiting, diarrhea. No fevers overnight     Review of systems:  As stated above in the chief complaint, otherwise negative.     Medications:  Scheduled Meds:   insulin glargine  65 Units SubCUTAneous QAM    insulin lispro  18 Units SubCUTAneous TID WC    sodium chloride flush  5-40 mL IntraVENous 2 times per day    escitalopram  10 mg Oral Daily    melatonin  6 mg Oral Nightly    allopurinol  300 mg Oral Daily    aspirin  81 mg Oral Daily    insulin lispro  0-18 Units SubCUTAneous TID WC    insulin lispro  0-9 Units SubCUTAneous Nightly    lisinopril  20 mg Oral Daily    rosuvastatin  20 mg Oral Nightly    sodium chloride flush  5-40 mL IntraVENous 2 times per day    ceFAZolin  2,000 mg IntraVENous Q8H    mupirocin   Nasal BID     Continuous Infusions:   heparin (PORCINE) Infusion 8.77 Units/kg/hr (05/30/22 0954)    sodium chloride      dilTIAZem 15 mg/hr (05/30/22 0814)    sodium chloride 75 mL/hr at 05/30/22 0521    dextrose      sodium chloride       PRN Meds:heparin (porcine), heparin (porcine), perflutren lipid microspheres, sodium chloride flush, sodium chloride, ondansetron **OR** ondansetron, HYDROmorphone **OR** HYDROmorphone, oxyCODONE **OR** oxyCODONE, glucose, dextrose bolus **OR** dextrose bolus, glucagon (rDNA), dextrose, sodium chloride flush, sodium chloride, ondansetron **OR** ondansetron, polyethylene glycol, acetaminophen **OR** acetaminophen, magnesium sulfate, potassium chloride **OR** potassium alternative oral replacement **OR** potassium chloride    OBJECTIVE:  /77   Pulse (!) 103   Temp 97.4 °F (36.3 °C) (Axillary)   Resp 20   Ht 5' 9\" (1.753 m)   Wt 251 lb 6.4 oz (114 kg) SpO2 93%   BMI 37.13 kg/m²   Temp  Av.3 °F (36.8 °C)  Min: 97.4 °F (36.3 °C)  Max: 98.6 °F (37 °C)  Constitutional: The patient is awake, alert, and oriented. Skin: Warm and dry. No rashes were noted. L upper back OR site with dressing  HEENT: Round and reactive pupils. Moist mucous membranes. No ulcerations or thrush. Neck: Supple to movements. Chest: No use of accessory muscles to breathe. Symmetrical expansion. No wheezing, crackles or rhonchi. Cardiovascular: S1 and S2 are rhythmic and regular. No murmurs appreciated. Abdomen: Positive bowel sounds to auscultation. Benign to palpation. No masses felt. No hepatosplenomegaly. Extremities: No clubbing, no cyanosis, no edema.   Lines: peripheral    22    Laboratory and Tests Review:  Lab Results   Component Value Date    WBC 14.2 (H) 2022    WBC 18.1 (H) 2022    WBC 19.9 (H) 2022    HGB 12.3 (L) 2022    HCT 39.3 2022    MCV 91.8 2022     2022     Lab Results   Component Value Date    NEUTROABS 9.96 (H) 2022    NEUTROABS 14.39 (H) 2022    NEUTROABS 15.91 (H) 2022     No results found for: [facility]  Lab Results   Component Value Date    ALT 14 2022    AST 10 2022    ALKPHOS 154 (H) 2022    BILITOT 0.7 2022     Lab Results   Component Value Date     2022    K 3.6 2022    CL 98 2022    CO2 24 2022    BUN 6 2022    CREATININE 0.5 2022    CREATININE 0.5 2022    CREATININE 0.6 2022    GFRAA >60 2022    LABGLOM >60 2022    GLUCOSE 229 2022    PROT 7.8 2022    LABALBU 3.8 2022    CALCIUM 8.3 2022    BILITOT 0.7 2022    ALKPHOS 154 2022    AST 10 2022    ALT 14 2022     Lab Results   Component Value Date    CRP 43.9 (H) 2022     Lab Results   Component Value Date    SEDRATE 51 (H) 2022     Radiology:  Reviewed     Microbiology:   Blood Cultures 5/27/22: negative so far  Surgical Cultures 5/29/22: pending Gram Stain: gram positive cocci in pairs, gram positive cocci in clusters, gram positive cocci    ASSESSMENT:  Recurrent furuncles and carbuncle-back  Cellulitis of the back  Historically has been MSSA  Hyperglycemia associated with the above  Leukocytosis associated with the above     Plan:    Continue cefazolin, tentatively plan for p.o. doxy on DC  I&D of skin, soft tissue and muscle 5/29/22  Local wound care  Strong hygiene educational program given to the patient  Check surgical cultures and monitor labs  Will follow with you    FRANCISCO Max - CNP  10:29 AM  5/30/2022   Pt seen and examined. Above discussed agree with advanced practice nurse. Labs, cultures, and radiographs reviewed. Face to Face encounter occurred. Changes made as necessary.      Kaaren Favre, MD

## 2022-05-30 NOTE — PROGRESS NOTES
Patient in bed at this time, sitting up and talking on the phone. Patient remains on Cardizem drip, heparin drip and NS infusion. (see mar for further details). Patient states that his pain is pretty well controlled. Denies needs at this time. Next APTT to be drawn at 2330. Will continue to monitor patient closely.

## 2022-05-30 NOTE — PLAN OF CARE
Problem: Pain  Goal: Verbalizes/displays adequate comfort level or baseline comfort level  Outcome: Progressing     Problem: Safety - Adult  Goal: Free from fall injury  Outcome: Progressing  Flowsheets (Taken 5/30/2022 0745)  Free From Fall Injury: Instruct family/caregiver on patient safety     Problem: ABCDS Injury Assessment  Goal: Absence of physical injury  Outcome: Progressing  Flowsheets (Taken 5/30/2022 0745)  Absence of Physical Injury: Implement safety measures based on patient assessment     Problem: Chronic Conditions and Co-morbidities  Goal: Patient's chronic conditions and co-morbidity symptoms are monitored and maintained or improved  Outcome: Progressing     Problem: Discharge Planning  Goal: Discharge to home or other facility with appropriate resources  Outcome: Progressing

## 2022-05-30 NOTE — PROGRESS NOTES
Called and spoke to Dr Latonya Feliciano in regards to patients order for Lovenox (2 orders) (clairification required): Dr Latonya Feliciano states that due to the patients new onset AFIB she is going to switch the orders.  Awaiting new orders to be placed per MD.

## 2022-05-30 NOTE — PROGRESS NOTES
[facility] Progress Note    Admitting Date and Time: 5/27/2022  1:10 PM  Admit Dx: Cellulitis and abscess of trunk [L03.319, T48.093]  Hypochloremia [E87.8]  Hyponatremia [E87.1]  Back abscess [L02.212]  Abscess of left shoulder [L02.414]  Diabetic ketoacidosis without coma associated with type 2 diabetes mellitus ([address] [E11.10]    Subjective:  Patient is being followed for Cellulitis and abscess of trunk [L03.319, Y66.079]  Hypochloremia [E87.8]  Hyponatremia [E87.1]  Back abscess [L02.212]  Abscess of left shoulder [L02.414]  Diabetic ketoacidosis without coma associated with type 2 diabetes mellitus ([facility] [E11.10]   Pt feels good. Telemetry monitoring showing HR in 120s. Appreciate Cardiology recommendations. Per RN: No new concerns. ROS: denies fever, chills, cp, sob, n/v, HA unless stated above.       digoxin  250 mcg Oral Q6H    metoprolol tartrate  25 mg Oral BID    insulin glargine  65 Units SubCUTAneous QAM    insulin lispro  18 Units SubCUTAneous TID WC    sodium chloride flush  5-40 mL IntraVENous 2 times per day    escitalopram  10 mg Oral Daily    melatonin  6 mg Oral Nightly    allopurinol  300 mg Oral Daily    aspirin  81 mg Oral Daily    insulin lispro  0-18 Units SubCUTAneous TID WC    insulin lispro  0-9 Units SubCUTAneous Nightly    lisinopril  20 mg Oral Daily    rosuvastatin  20 mg Oral Nightly    sodium chloride flush  5-40 mL IntraVENous 2 times per day    ceFAZolin  2,000 mg IntraVENous Q8H    mupirocin   Nasal BID     heparin (porcine), 4,000 Units, PRN  heparin (porcine), 2,000 Units, PRN  perflutren lipid microspheres, 1.5 mL, ONCE PRN  sodium chloride flush, 5-40 mL, PRN  sodium chloride, , PRN  ondansetron, 4 mg, Q8H PRN   Or  ondansetron, 4 mg, Q6H PRN  HYDROmorphone, 0.25 mg, Q3H PRN   Or  HYDROmorphone, 0.5 mg, Q3H PRN  oxyCODONE, 5 mg, Q4H PRN   Or  oxyCODONE, 10 mg, Q4H PRN  glucose, 4 tablet, PRN  dextrose bolus, 125 mL, PRN   Or  dextrose bolus, 250 mL, PRN  glucagon (rDNA), 1 mg, PRN  dextrose, 100 mL/hr, PRN  sodium chloride flush, 5-40 mL, PRN  sodium chloride, , PRN  ondansetron, 4 mg, Q8H PRN   Or  ondansetron, 4 mg, Q6H PRN  polyethylene glycol, 17 g, Daily PRN  acetaminophen, 650 mg, Q6H PRN   Or  acetaminophen, 650 mg, Q6H PRN  magnesium sulfate, 2,000 mg, PRN  potassium chloride, 40 mEq, PRN   Or  potassium alternative oral replacement, 40 mEq, PRN   Or  potassium chloride, 10 mEq, PRN    Objective:    /77   Pulse (!) 102   Temp 98.5 °F (36.9 °C) (Oral)   Resp 18   Ht 5' 9\" (1.753 m)   Wt 251 lb 6.4 oz (114 kg)   SpO2 96%   BMI 37.13 kg/m²     General Appearance: alert and oriented to person, place and time and in no acute distress  Skin: warm and dry  Head: normocephalic and atraumatic  Eyes: pupils equal, round, and reactive to light, extraocular eye movements intact, conjunctivae normal  Neck: neck supple and non tender without mass   Pulmonary/Chest: clear to auscultation bilaterally- no wheezes, rales or rhonchi, normal air movement, no respiratory distress  Cardiovascular: normal rate, normal S1 and S2 and no carotid bruits  Abdomen: soft, non-tender, non-distended, normal bowel sounds, no masses or organomegaly  Extremities: no cyanosis, no clubbing and no edema  Neurologic: no cranial nerve deficit and speech normal    Recent Labs     05/28/22  0440 05/29/22  0327 05/30/22  0545   * 135 133   K 4.2 3.4* 3.6   CL 93* 100 98   CO2 22 24 24   BUN 11 6 6   CREATININE 0.6* 0.5* 0.5*   GLUCOSE 284* 154* 229*   CALCIUM 8.2* 7.3* 8.3*       Recent Labs     05/28/22  0440 05/29/22  0955 05/30/22  0545   WBC 19.9* 18.1* 14.2*   RBC 4.64 4.74 4.28   HGB 13.4 13.9 12.3*   HCT 41.0 42.1 39.3   MCV 88.4 88.8 91.8   MCH 28.9 29.3 28.7   MCHC 32.7 33.0 31.3*   RDW 12.9 12.8 13.2    323 315   MPV 10.7 10.6 11.2       Radiology:   No results found.     Assessment:    Principal Problem:    Necrotizing soft tissue infection  Active Problems:    Back abscess    Abscess of left shoulder    Cellulitis of back    Pulmonary nodule    Atrial fibrillation, rapid ([address]    Poorly controlled type 2 diabetes mellitus (HCC)    HTN (hypertension)    Hyperlipidemia  Resolved Problems:    * No resolved hospital problems. *      Plan:  1. Infected Furuncles and Carbuncles, back - drsg in place. Continue IV antibiotics. Transition to oral doxycycline on discharge. 2.  Atrial fibrillation, new - continue IV Cardizem and Digoxin. Oral Metoprolol 25 mg po bid. If does not convert in the next 24 hours, Cardiology planning KENDAL/Cardioversion. 3.  T2DM/Insulin Resistance/Metabolic Syndrome - Endocrinology recommendations appreciated. Currently on 65 U of Lantus q am.  Humalog 18 U with meals. HD insulin SS. Last HgbA1C on 5/27/22 was 12.7%  4. Subclinical hypothyroidism - will need to be monitored as an outpatient. Recheck TSH in 1 month. 5.  Hyperlipidemia - on a statin. Last Lipid panel on 4/19/22 - 287/392/47/162. Add Zetia. Monitor for side effects. NOTE: This report was transcribed using voice recognition software. Every effort was made to ensure accuracy; however, inadvertent computerized transcription errors may be present.     Electronically signed by Heather Thornton MD on 5/30/2022 at 5:16 PM

## 2022-05-30 NOTE — CONSULTS
Inpatient Cardiology Consultation      Reason for Consult:   New onset A. fib with RVR in the postop period      Consulting Physician: Dr. Nilesh Martinez      Requesting Physician:    Dr. Elias Tinajero    He was not previously known to [facility]) cardiology. Date of Consultation: 5/30/2022    HISTORY OF PRESENT ILLNESS:   Mr. Abhijit García is a 29-year-old  male with a history of type 2 diabetes on insulin, hypertension, hyperlipidemia, renal stones presented to the emergency room on 5/27/2022 with complaints of abscess in his left posterior chest.  He noticed a lesion on his left upper for about 3 days and he went to the urgent care center where an I&D was was performed and was referred to his primary care provider for follow-up. He presented to the emergency room on 5/27/2022 with swelling and and redness. Patient was initiated on IV antibiotics and admitted for further evaluation. Patient underwent I&D and wound debridement on 5/29/2022. Postoperatively, patient went into A. fib with RVR. He never had any atrial fibrillation. No prior history of any strokes, congestive heart failure or heart attacks. No 70 thyroid issues. He is severely obese but never tested for any sleep apnea. Patient was initiated on IV Cardizem drip currently he is 15 mg/h and still has A. fib with RVR. Currently, he denies any chest pain, dyspnea, orthopnea, PND. Feels occasional palpitations. He a lifetime non-smoker. Denies alcohol or illicit drug use. His mother had an MI in her mid 46s. Please note: past medical records were reviewed per electronic medical record (EMR) - see detailed reports under Past Medical/ Surgical History.    Past Medical History:    Past Medical History:   Diagnosis Date    Hyperlipidemia     Hypertension     Kidney stones     Multiple times    Type II or unspecified type diabetes mellitus without mention of complication, not stated as uncontrolled        Past Surgical History:    Past Surgical History:   Procedure Laterality Date    FRACTURE SURGERY  1992    Lt leg & ankle fx    TONSILLECTOMY  2000       Medications Prior to admit:  Prior to Admission medications    Medication Sig Start Date End Date Taking?  Authorizing Provider   lisinopril (PRINIVIL;ZESTRIL) 20 MG tablet TAKE 1 TABLET BY MOUTH EVERY DAY 5/23/22   Sarah Ortiz MD   escitalopram (LEXAPRO) 10 MG tablet TAKE 1 TABLET BY MOUTH EVERY DAY 5/23/22   Sarah Ortiz MD   rosuvastatin (CRESTOR) 10 MG tablet TAKE 1 TABLET BY MOUTH EVERYDAY AT BEDTIME 5/23/22   Sarah Ortiz MD   rosuvastatin (CRESTOR) 20 MG tablet TAKE 1 TABLET BY MOUTH EVERYDAY AT BEDTIME 5/16/22   Sarah Ortiz MD   Semaglutide 3 MG TABS Take 3 mg by mouth daily Start 7 mg when this Rx is complete 4/21/22   Sarah rOtiz MD   Semaglutide 7 MG TABS Take 7 mg by mouth daily Begin this Rx when 3 mg Rx is complete 4/21/22   Sarah Ortiz MD   FARXIGA 10 MG tablet TAKE 1 TABLET BY MOUTH EVERY DAY IN THE MORNING 8/19/21   Sarah Ortiz MD   sitaGLIPtan-metFORMIN (JANUMET)  MG per tablet TAKE 1 TABLET BY MOUTH TWICE A DAY 6/29/21   Sarah Ortiz MD   allopurinol (ZYLOPRIM) 300 MG tablet TAKE 1 TABLET BY MOUTH EVERY DAY 5/17/21   Sarah Ortiz MD   ibuprofen (IBU) 600 MG tablet Take 1 tablet by mouth every 6 hours as needed for Pain 12/19/20 12/24/20  GREGORIO Banegas   omega-3 acid ethyl esters (LOVAZA) 1 g capsule TAKE 2 CAPSULES BY MOUTH TWICE A DAY 12/10/20   Sarah Ortiz MD   allopurinol (ZYLOPRIM) 300 MG tablet TAKE 1 TABLET BY MOUTH EVERY DAY 3/10/20   Sarah Ortiz MD   insulin aspart (NOVOLOG FLEXPEN) 100 UNIT/ML injection pen Up to 45 units QA 11/6/19   Sarah Ortiz MD   insulin detemir (LEVEMIR FLEXTOUCH) 100 UNIT/ML injection pen Inject 100 Units into the skin 2 times daily 7/29/19   Sarah Ortiz MD   NOVOLOG FLEXPEN 100 UNIT/ML injection pen SCALE < 150--15 UNITS 151-200--20 UNITS 201-250--25 UNITS 251-300--30 UNITS 288-169--43 UNITS 1/17/18   Merlin Notice, MD   Glucose Blood (BLOOD GLUCOSE TEST STRIPS) STRP Test four times a day 1/6/16   Merlin Notice, MD   BD ULTRA-FINE PEN NEEDLES 29G X 12.7MM MISC USE 5 TIMES DAILY OR AS DIRECTED 9/2/14   Merlin Notice, MD   aspirin 81 MG tablet Take 81 mg by mouth daily. Historical Provider, MD   Blood Glucose Monitoring Suppl (ONE TOUCH II TEST STRIP RESENDEZ) by Does not apply route.       Historical Provider, MD       Current Medications:    Current Facility-Administered Medications: heparin (porcine) injection 4,000 Units, 4,000 Units, IntraVENous, PRN  heparin (porcine) injection 2,000 Units, 2,000 Units, IntraVENous, PRN  heparin 25,000 units in dextrose 5% 250 mL (premix) infusion, 5-30 Units/kg/hr, IntraVENous, Continuous  perflutren lipid microspheres (DEFINITY) injection 1.65 mg, 1.5 mL, IntraVENous, ONCE PRN  insulin glargine (LANTUS) injection vial 65 Units, 65 Units, SubCUTAneous, QAM  insulin lispro (HUMALOG) injection vial 18 Units, 18 Units, SubCUTAneous, TID WC  sodium chloride flush 0.9 % injection 5-40 mL, 5-40 mL, IntraVENous, 2 times per day  sodium chloride flush 0.9 % injection 5-40 mL, 5-40 mL, IntraVENous, PRN  0.9 % sodium chloride infusion, , IntraVENous, PRN  ondansetron (ZOFRAN-ODT) disintegrating tablet 4 mg, 4 mg, Oral, Q8H PRN **OR** ondansetron (ZOFRAN) injection 4 mg, 4 mg, IntraVENous, Q6H PRN  HYDROmorphone (DILAUDID) injection 0.25 mg, 0.25 mg, IntraVENous, Q3H PRN **OR** HYDROmorphone (DILAUDID) injection 0.5 mg, 0.5 mg, IntraVENous, Q3H PRN  oxyCODONE (ROXICODONE) immediate release tablet 5 mg, 5 mg, Oral, Q4H PRN **OR** oxyCODONE (ROXICODONE) immediate release tablet 10 mg, 10 mg, Oral, Q4H PRN  dilTIAZem 125 mg in dextrose 5 % 125 mL infusion, 2.5-15 mg/hr, IntraVENous, Continuous  escitalopram (LEXAPRO) tablet 10 mg, 10 mg, Oral, Daily  melatonin tablet 6 mg, 6 mg, Oral, Nightly  0.9 % sodium chloride infusion, , IntraVENous, Continuous  allopurinol (ZYLOPRIM) tablet 300 mg, 300 mg, Oral, Daily  aspirin EC tablet 81 mg, 81 mg, Oral, Daily  insulin lispro (HUMALOG) injection vial 0-18 Units, 0-18 Units, SubCUTAneous, TID WC  insulin lispro (HUMALOG) injection vial 0-9 Units, 0-9 Units, SubCUTAneous, Nightly  glucose chewable tablet 16 g, 4 tablet, Oral, PRN  dextrose bolus 10% 125 mL, 125 mL, IntraVENous, PRN **OR** dextrose bolus 10% 250 mL, 250 mL, IntraVENous, PRN  glucagon (rDNA) injection 1 mg, 1 mg, IntraMUSCular, PRN  dextrose 5 % solution, 100 mL/hr, IntraVENous, PRN  lisinopril (PRINIVIL;ZESTRIL) tablet 20 mg, 20 mg, Oral, Daily  rosuvastatin (CRESTOR) tablet 20 mg, 20 mg, Oral, Nightly  sodium chloride flush 0.9 % injection 5-40 mL, 5-40 mL, IntraVENous, 2 times per day  sodium chloride flush 0.9 % injection 5-40 mL, 5-40 mL, IntraVENous, PRN  0.9 % sodium chloride infusion, , IntraVENous, PRN  ondansetron (ZOFRAN-ODT) disintegrating tablet 4 mg, 4 mg, Oral, Q8H PRN **OR** ondansetron (ZOFRAN) injection 4 mg, 4 mg, IntraVENous, Q6H PRN  polyethylene glycol (GLYCOLAX) packet 17 g, 17 g, Oral, Daily PRN  acetaminophen (TYLENOL) tablet 650 mg, 650 mg, Oral, Q6H PRN **OR** acetaminophen (TYLENOL) suppository 650 mg, 650 mg, Rectal, Q6H PRN  magnesium sulfate 2000 mg in 50 mL IVPB premix, 2,000 mg, IntraVENous, PRN  potassium chloride (KLOR-CON M) extended release tablet 40 mEq, 40 mEq, Oral, PRN **OR** potassium bicarb-citric acid (EFFER-K) effervescent tablet 40 mEq, 40 mEq, Oral, PRN **OR** potassium chloride 10 mEq/100 mL IVPB (Peripheral Line), 10 mEq, IntraVENous, PRN  ceFAZolin (ANCEF) 2000 mg in sterile water 20 mL IV syringe, 2,000 mg, IntraVENous, Q8H  mupirocin (BACTROBAN) 2 % ointment, , Nasal, BID    Allergies:  Sulfa antibiotics    Social History:    Social History     Socioeconomic History    Marital status:      Spouse name: Not on file    Number of children: Not on file    Years of education: Not on file    Highest education level: Not on file   Occupational History    Not on file   Tobacco Use    Smoking status: Never Smoker    Smokeless tobacco: Never Used   Vaping Use    Vaping Use: Never used   Substance and Sexual Activity    Alcohol use: No     Comment: soc    Drug use: No    Sexual activity: Not on file   Other Topics Concern    Not on file   Social History Narrative    Not on file     Social Determinants of Health     Financial Resource Strain: Low Risk     Difficulty of Paying Living Expenses: Not hard at all   Food Insecurity: No Food Insecurity    Worried About [facility] in the Last Year: Never true    [facility] in the Last Year: Never true   Transportation Needs:     Lack of Transportation (Medical): Not on file    Lack of Transportation (Non-Medical):  Not on file   Physical Activity:     Days of Exercise per Week: Not on file    Minutes of Exercise per Session: Not on file   Stress:     Feeling of Stress : Not on file   Social Connections:     Frequency of Communication with Friends and Family: Not on file    Frequency of Social Gatherings with Friends and Family: Not on file    Attends Baptism Services: Not on file    Active Member of [address] [place] [address] or Organizations: Not on file    Attends Club or Organization Meetings: Not on file    Marital Status: Not on file   Intimate Partner Violence:     Fear of Current or Ex-Partner: Not on file    Emotionally Abused: Not on file    Physically Abused: Not on file    Sexually Abused: Not on file   Housing Stability:     Unable to Pay for Housing in the Last Year: Not on file    Number of [place] in the Last Year: Not on file    Unstable Housing in the Last Year: Not on file       Family History:   Family History   Problem Relation Age of Onset    Diabetes Mother     High Blood Pressure Mother     Cancer Father         prostate    Heart Disease Father MI @ 52yrs old/ also 1 stent       REVIEW OF SYSTEMS:     · Constitutional: Denies fatigue, fevers, chills or night sweats  · Eyes: Denies visual changes or drainage  · ENT: Denies headaches or hearing loss. No mouth sores or sore throat. No epistaxis   · Cardiovascular: Denies chest pain, pressure or palpitations. No lower extremity swelling. · Respiratory: Denies GARCIA, cough, orthopnea or PND. No hemoptysis   · Gastrointestinal: Denies hematemesis or anorexia. No hematochezia or melena    · Genitourinary: Denies urgency, dysuria or hematuria. · Musculoskeletal: Denies gait disturbance, weakness or joint complaints  · Integumentary: Denies rash, hives or pruritis   · Neurological: Denies dizziness, headaches or seizures. No numbness or tingling  · Psychiatric: Denies anxiety or depression. · Endocrine: Denies temperature intolerance. No recent weight change. .  · Hematologic/Lymphatic: Denies abnormal bruising or bleeding. No swollen lymph nodes    PHYSICAL EXAM:   /77   Pulse (!) 103   Temp 97.4 °F (36.3 °C) (Axillary)   Resp 20   Ht 5' 9\" (1.753 m)   Wt 251 lb 6.4 oz (114 kg)   SpO2 93%   BMI 37.13 kg/m²   CONST:  Well developed, well nourished who appears of stated age. Awake, alert and cooperative. No apparent distress. HEENT:   Head- Normocephalic, atraumatic   Eyes- Conjunctivae pink, anicteric  Throat- Oral mucosa pink and moist  Neck-  No stridor, trachea midline, no jugular venous distention. No carotid bruit. CHEST: Chest symmetrical and non-tender to palpation. No accessory muscle use or intercostal retractions  RESPIRATORY: Lung sounds - clear throughout fields   CARDIOVASCULAR:     Heart Inspection- shows no noted pulsations  Heart Palpation- no heaves or thrills; PMI is non-displaced   Heart Ausculation-irregularly irregular rate and rhythm, tachycardic no murmur. No s3, s4 or rub   PV: No lower extremity edema. No varicosities.  Pedal pulses palpable, no clubbing or cyanosis ABDOMEN: Soft, non-tender to light palpation. Bowel sounds present. No palpable masses no organomegaly; no abdominal bruit  MS: Good muscle strength and tone. No atrophy or abnormal movements. : Deferred  SKIN: Warm and dry no statis dermatitis or ulcers. Has dressings over the left upper posterior chest.  NEURO / PSYCH: Oriented to person, place and time. Speech clear and appropriate. Follows all commands. Pleasant affect     DATA:    ECG / Tele strips: please see HPI   Diagnostic:    No intake or output data in the 24 hours ending 05/30/22 1239    Labs:   CBC:   Recent Labs     05/29/22  0955 05/30/22  0545   WBC 18.1* 14.2*   HGB 13.9 12.3*   HCT 42.1 39.3    315     BMP:   Recent Labs     05/29/22  0327 05/30/22  0545    133   K 3.4* 3.6   CO2 24 24   BUN 6 6   CREATININE 0.5* 0.5*   LABGLOM >60 >60   CALCIUM 7.3* 8.3*     Mag:   Recent Labs     05/29/22  0327   MG 2.1     Phos: No results for input(s): PHOS in the last 72 hours. TSH:   Recent Labs     05/30/22  0545   TSH 4.470*     HgA1c:   Lab Results   Component Value Date    LABA1C 12.7 (H) 05/27/2022     No results found for: EAG  proBNP: No results for input(s): PROBNP in the last 72 hours. PT/INR: No results for input(s): PROTIME, INR in the last 72 hours. APTT:  Recent Labs     05/30/22  0835   APTT 28.0     CARDIAC ENZYMES:No results for input(s): CKTOTAL, CKMB, CKMBINDEX, TROPONINI in the last 72 hours.   FASTING LIPID PANEL:  Lab Results   Component Value Date    CHOL 287 04/19/2022    HDL 47 04/19/2022    LDLCALC 162 04/19/2022    TRIG 392 04/19/2022     LIVER PROFILE:  Recent Labs     05/27/22  1353   AST 10   ALT 14   LABALBU 3.8   Data review:  Telemetry-A. fib with RVR heart rate in the 120s    EKG: Sinus tachycardia, nonspecific ST-T changes, abnormal EKG    Labs and vitals were reviewed: Blood pressure 136/67 heart rate 103 sats 93%    Labs- BUNs/creatinine 6 last 0.5 rest of the chemistry normal except for glucose of 229.  Liver functions normal.  TSH 4.47, WBC 18.1>> 14.2, H&H 12.3/39.3    Impression:  · New onset A. fib with RVR  · [id number] Vasc score 2  · Right upper chest abscess with cellulitis status post I&D on IV antibiotics  · Type 2 diabetes on insulin  · Hypertension, stable  · Hyperlipidemia on statin  · History of renal stones      Plan:  · Continue IV Cardizem for rate control, currently he is a 15 mg/h. We will add digoxin 250mcg IV  every 6 hours x2 for rate control. He already received 500 mcg of digoxin at 6 PM.  Add metoprolol 25 mg p.o. twice daily. · If he does not convert to sinus rhythm in the next 24 hours then cardioversion is an option. · Obtain EKG to document A. Fib  · Agree with an echocardiogram to assess LV function and to rule out any structural heart disease  · Patient was advised to get a sleep study as an outpatient  · Continue IV heparin and consider changing to Eliquis 5 mg p.o. twice daily if no further surgical procedures are planned. · Further recommendation pending above    Thank you for consulting and allowing us to participate in Mr. Jermain Alicea [facility]. Kamryn Orosco MD, [facility] cardiology.     Electronically signed by John Cochran MD on 5/30/2022 at 12:39 PM

## 2022-05-30 NOTE — PROGRESS NOTES
Hepatobiliary and Pancreatic Surgery Progress Note    CC: back abscesss    Subjective: Patient states that he is feeling much better despite the large hole. The pressure is gone. He did go into a fib RVR post procedure. OBJECTIVE      Physical    /72   Pulse 95   Temp 98.5 °F (36.9 °C) (Oral)   Resp 16   Ht 5' 9\" (1.753 m)   Wt 251 lb 6.4 oz (114 kg)   SpO2 93%   BMI 37.13 kg/m²       General appearance: appears in no acute distress  Lungs:respiratory effort normal without accessory numbers  Heart: no pedal edema  Abdomen: soft, nondistended, nontympanic, no guarding, no peritoneal signs, normoactive bowel sounds, looks good  Extremities: ROM normal    ASSESSMENT: POD 1 excisional debridement [id number] for NSTI    PLAN:    - packing changes twice a day  - antibiotics per ID  - Dr. Kan Less will resume care tomorrow    Thank you for the consultation and allowing me to take part in Mr. Connie Dickens' care.       Robert Acosta MD 5/30/2022 7:31 AM

## 2022-05-31 LAB
ANION GAP SERPL CALCULATED.3IONS-SCNC: 10 MMOL/L (ref 7–16)
APTT: 29 SEC (ref 24.5–35.1)
APTT: 38.6 SEC (ref 24.5–35.1)
APTT: 50.3 SEC (ref 24.5–35.1)
BASOPHILS ABSOLUTE: 0.06 E9/L (ref 0–0.2)
BASOPHILS RELATIVE PERCENT: 0.6 % (ref 0–2)
BUN BLDV-MCNC: 6 MG/DL (ref 6–20)
CALCIUM SERPL-MCNC: 7.9 MG/DL (ref 8.6–10.2)
CHLORIDE BLD-SCNC: 96 MMOL/L (ref 98–107)
CO2: 27 MMOL/L (ref 22–29)
CREAT SERPL-MCNC: 0.7 MG/DL (ref 0.7–1.2)
EOSINOPHILS ABSOLUTE: 0.27 E9/L (ref 0.05–0.5)
EOSINOPHILS RELATIVE PERCENT: 2.8 % (ref 0–6)
GFR AFRICAN AMERICAN: >60
GFR NON-AFRICAN AMERICAN: >60 ML/MIN/1.73
GLUCOSE BLD-MCNC: 383 MG/DL (ref 74–99)
HCT VFR BLD CALC: 39.1 % (ref 37–54)
HEMOGLOBIN: 12.6 G/DL (ref 12.5–16.5)
IMMATURE GRANULOCYTES #: 0.2 E9/L
IMMATURE GRANULOCYTES %: 2 % (ref 0–5)
LYMPHOCYTES ABSOLUTE: 1.66 E9/L (ref 1.5–4)
LYMPHOCYTES RELATIVE PERCENT: 17 % (ref 20–42)
MCH RBC QN AUTO: 28.6 PG (ref 26–35)
MCHC RBC AUTO-ENTMCNC: 32.2 % (ref 32–34.5)
MCV RBC AUTO: 88.9 FL (ref 80–99.9)
METER GLUCOSE: 106 MG/DL (ref 74–99)
METER GLUCOSE: 188 MG/DL (ref 74–99)
METER GLUCOSE: 253 MG/DL (ref 74–99)
METER GLUCOSE: 266 MG/DL (ref 74–99)
MONOCYTES ABSOLUTE: 1.29 E9/L (ref 0.1–0.95)
MONOCYTES RELATIVE PERCENT: 13.2 % (ref 2–12)
NEUTROPHILS ABSOLUTE: 6.28 E9/L (ref 1.8–7.3)
NEUTROPHILS RELATIVE PERCENT: 64.4 % (ref 43–80)
PDW BLD-RTO: 13.1 FL (ref 11.5–15)
PLATELET # BLD: 373 E9/L (ref 130–450)
PMV BLD AUTO: 10.3 FL (ref 7–12)
POTASSIUM REFLEX MAGNESIUM: 3.6 MMOL/L (ref 3.5–5)
RBC # BLD: 4.4 E12/L (ref 3.8–5.8)
REASON FOR REJECTION: NORMAL
REJECTED TEST: NORMAL
SODIUM BLD-SCNC: 133 MMOL/L (ref 132–146)
WBC # BLD: 9.8 E9/L (ref 4.5–11.5)

## 2022-05-31 PROCEDURE — 99232 SBSQ HOSP IP/OBS MODERATE 35: CPT | Performed by: INTERNAL MEDICINE

## 2022-05-31 PROCEDURE — 6370000000 HC RX 637 (ALT 250 FOR IP): Performed by: FAMILY MEDICINE

## 2022-05-31 PROCEDURE — 6370000000 HC RX 637 (ALT 250 FOR IP): Performed by: TRANSPLANT SURGERY

## 2022-05-31 PROCEDURE — 99233 SBSQ HOSP IP/OBS HIGH 50: CPT | Performed by: FAMILY MEDICINE

## 2022-05-31 PROCEDURE — 2580000003 HC RX 258: Performed by: TRANSPLANT SURGERY

## 2022-05-31 PROCEDURE — 99233 SBSQ HOSP IP/OBS HIGH 50: CPT | Performed by: INTERNAL MEDICINE

## 2022-05-31 PROCEDURE — 6360000002 HC RX W HCPCS: Performed by: FAMILY MEDICINE

## 2022-05-31 PROCEDURE — 6370000000 HC RX 637 (ALT 250 FOR IP)

## 2022-05-31 PROCEDURE — 6370000000 HC RX 637 (ALT 250 FOR IP): Performed by: INTERNAL MEDICINE

## 2022-05-31 PROCEDURE — 36415 COLL VENOUS BLD VENIPUNCTURE: CPT

## 2022-05-31 PROCEDURE — 85025 COMPLETE CBC W/AUTO DIFF WBC: CPT

## 2022-05-31 PROCEDURE — 2060000000 HC ICU INTERMEDIATE R&B

## 2022-05-31 PROCEDURE — 85730 THROMBOPLASTIN TIME PARTIAL: CPT

## 2022-05-31 PROCEDURE — 80048 BASIC METABOLIC PNL TOTAL CA: CPT

## 2022-05-31 PROCEDURE — 82962 GLUCOSE BLOOD TEST: CPT

## 2022-05-31 PROCEDURE — 2500000003 HC RX 250 WO HCPCS: Performed by: TRANSPLANT SURGERY

## 2022-05-31 PROCEDURE — 6370000000 HC RX 637 (ALT 250 FOR IP): Performed by: REGISTERED NURSE

## 2022-05-31 PROCEDURE — 6360000002 HC RX W HCPCS: Performed by: TRANSPLANT SURGERY

## 2022-05-31 RX ORDER — ALOGLIPTIN 6.25 MG/1
12.5 TABLET, FILM COATED ORAL 2 TIMES DAILY
Status: DISCONTINUED | OUTPATIENT
Start: 2022-05-31 | End: 2022-05-31

## 2022-05-31 RX ORDER — ALOGLIPTIN 25 MG/1
25 TABLET, FILM COATED ORAL DAILY
Status: DISCONTINUED | OUTPATIENT
Start: 2022-05-31 | End: 2022-06-01 | Stop reason: HOSPADM

## 2022-05-31 RX ORDER — INSULIN LISPRO 100 [IU]/ML
18 INJECTION, SOLUTION INTRAVENOUS; SUBCUTANEOUS
Status: DISCONTINUED | OUTPATIENT
Start: 2022-06-01 | End: 2022-06-01

## 2022-05-31 RX ORDER — LINEZOLID 600 MG/1
600 TABLET, FILM COATED ORAL EVERY 12 HOURS SCHEDULED
Status: DISCONTINUED | OUTPATIENT
Start: 2022-05-31 | End: 2022-06-01 | Stop reason: HOSPADM

## 2022-05-31 RX ORDER — INSULIN GLARGINE 100 [IU]/ML
50 INJECTION, SOLUTION SUBCUTANEOUS EVERY MORNING
Status: DISCONTINUED | OUTPATIENT
Start: 2022-06-01 | End: 2022-06-01

## 2022-05-31 RX ORDER — LINEZOLID 600 MG/1
600 TABLET, FILM COATED ORAL 2 TIMES DAILY
Qty: 26 TABLET | Refills: 0 | Status: SHIPPED | OUTPATIENT
Start: 2022-05-31 | End: 2022-06-13

## 2022-05-31 RX ORDER — INSULIN LISPRO 100 [IU]/ML
23 INJECTION, SOLUTION INTRAVENOUS; SUBCUTANEOUS
Status: DISCONTINUED | OUTPATIENT
Start: 2022-05-31 | End: 2022-05-31

## 2022-05-31 RX ADMIN — SODIUM CHLORIDE, PRESERVATIVE FREE 10 ML: 5 INJECTION INTRAVENOUS at 08:40

## 2022-05-31 RX ADMIN — MUPIROCIN: 20 OINTMENT TOPICAL at 20:25

## 2022-05-31 RX ADMIN — SODIUM CHLORIDE, PRESERVATIVE FREE 10 ML: 5 INJECTION INTRAVENOUS at 21:12

## 2022-05-31 RX ADMIN — OXYCODONE 10 MG: 5 TABLET ORAL at 10:28

## 2022-05-31 RX ADMIN — HEPARIN SODIUM 2000 UNITS: 1000 INJECTION INTRAVENOUS; SUBCUTANEOUS at 12:07

## 2022-05-31 RX ADMIN — LINEZOLID 600 MG: 600 TABLET, FILM COATED ORAL at 20:25

## 2022-05-31 RX ADMIN — EZETIMIBE 10 MG: 10 TABLET ORAL at 20:25

## 2022-05-31 RX ADMIN — ALLOPURINOL 300 MG: 300 TABLET ORAL at 08:40

## 2022-05-31 RX ADMIN — HEPARIN SODIUM 4000 UNITS: 1000 INJECTION INTRAVENOUS; SUBCUTANEOUS at 02:13

## 2022-05-31 RX ADMIN — LINEZOLID 600 MG: 600 TABLET, FILM COATED ORAL at 11:37

## 2022-05-31 RX ADMIN — INSULIN LISPRO 5 UNITS: 100 INJECTION, SOLUTION INTRAVENOUS; SUBCUTANEOUS at 20:28

## 2022-05-31 RX ADMIN — OXYCODONE 10 MG: 5 TABLET ORAL at 04:18

## 2022-05-31 RX ADMIN — DIGOXIN 250 MCG: 0.25 TABLET ORAL at 02:10

## 2022-05-31 RX ADMIN — MUPIROCIN: 20 OINTMENT TOPICAL at 08:50

## 2022-05-31 RX ADMIN — Medication 2000 MG: at 06:46

## 2022-05-31 RX ADMIN — ESCITALOPRAM OXALATE 10 MG: 10 TABLET ORAL at 08:40

## 2022-05-31 RX ADMIN — HEPARIN SODIUM 16.77 UNITS/KG/HR: 10000 INJECTION, SOLUTION INTRAVENOUS at 04:15

## 2022-05-31 RX ADMIN — OXYCODONE 10 MG: 5 TABLET ORAL at 21:10

## 2022-05-31 RX ADMIN — METOPROLOL TARTRATE 25 MG: 25 TABLET, FILM COATED ORAL at 10:30

## 2022-05-31 RX ADMIN — SODIUM CHLORIDE: 9 INJECTION, SOLUTION INTRAVENOUS at 08:49

## 2022-05-31 RX ADMIN — INSULIN LISPRO 3 UNITS: 100 INJECTION, SOLUTION INTRAVENOUS; SUBCUTANEOUS at 11:37

## 2022-05-31 RX ADMIN — INSULIN LISPRO 18 UNITS: 100 INJECTION, SOLUTION INTRAVENOUS; SUBCUTANEOUS at 08:25

## 2022-05-31 RX ADMIN — SODIUM CHLORIDE: 9 INJECTION, SOLUTION INTRAVENOUS at 21:51

## 2022-05-31 RX ADMIN — ALOGLIPTIN 25 MG: 25 TABLET, FILM COATED ORAL at 14:33

## 2022-05-31 RX ADMIN — INSULIN GLARGINE 65 UNITS: 100 INJECTION, SOLUTION SUBCUTANEOUS at 08:43

## 2022-05-31 RX ADMIN — SODIUM CHLORIDE, PRESERVATIVE FREE 10 ML: 5 INJECTION INTRAVENOUS at 08:42

## 2022-05-31 RX ADMIN — HEPARIN SODIUM 18.51 UNITS/KG/HR: 10000 INJECTION, SOLUTION INTRAVENOUS at 17:42

## 2022-05-31 RX ADMIN — INSULIN LISPRO 18 UNITS: 100 INJECTION, SOLUTION INTRAVENOUS; SUBCUTANEOUS at 11:38

## 2022-05-31 RX ADMIN — LISINOPRIL 20 MG: 20 TABLET ORAL at 08:40

## 2022-05-31 RX ADMIN — INSULIN LISPRO 9 UNITS: 100 INJECTION, SOLUTION INTRAVENOUS; SUBCUTANEOUS at 08:24

## 2022-05-31 RX ADMIN — ROSUVASTATIN CALCIUM 20 MG: 20 TABLET, FILM COATED ORAL at 20:25

## 2022-05-31 RX ADMIN — Medication 6 MG: at 21:10

## 2022-05-31 RX ADMIN — ASPIRIN 81 MG: 81 TABLET, COATED ORAL at 08:40

## 2022-05-31 RX ADMIN — METOPROLOL TARTRATE 25 MG: 25 TABLET, FILM COATED ORAL at 20:25

## 2022-05-31 RX ADMIN — METFORMIN HYDROCHLORIDE 1000 MG: 1000 TABLET ORAL at 17:34

## 2022-05-31 RX ADMIN — OXYCODONE 10 MG: 5 TABLET ORAL at 16:13

## 2022-05-31 ASSESSMENT — PAIN SCALES - GENERAL
PAINLEVEL_OUTOF10: 8
PAINLEVEL_OUTOF10: 8
PAINLEVEL_OUTOF10: 4
PAINLEVEL_OUTOF10: 6
PAINLEVEL_OUTOF10: 3
PAINLEVEL_OUTOF10: 8
PAINLEVEL_OUTOF10: 8

## 2022-05-31 ASSESSMENT — PAIN DESCRIPTION - LOCATION
LOCATION: BACK

## 2022-05-31 ASSESSMENT — PAIN DESCRIPTION - ORIENTATION
ORIENTATION: LEFT;UPPER;POSTERIOR
ORIENTATION: LEFT
ORIENTATION: LEFT;UPPER

## 2022-05-31 ASSESSMENT — PAIN DESCRIPTION - PAIN TYPE
TYPE: SURGICAL PAIN
TYPE: SURGICAL PAIN

## 2022-05-31 ASSESSMENT — PAIN DESCRIPTION - FREQUENCY: FREQUENCY: INTERMITTENT

## 2022-05-31 ASSESSMENT — PAIN DESCRIPTION - DESCRIPTORS
DESCRIPTORS: ACHING;PRESSURE

## 2022-05-31 ASSESSMENT — PAIN - FUNCTIONAL ASSESSMENT
PAIN_FUNCTIONAL_ASSESSMENT: PREVENTS OR INTERFERES SOME ACTIVE ACTIVITIES AND ADLS
PAIN_FUNCTIONAL_ASSESSMENT: PREVENTS OR INTERFERES SOME ACTIVE ACTIVITIES AND ADLS

## 2022-05-31 ASSESSMENT — PAIN DESCRIPTION - ONSET: ONSET: GRADUAL

## 2022-05-31 NOTE — PROGRESS NOTES
P Quality Flow/Interdisciplinary Rounds Progress Note        Quality Flow Rounds held on May 31, 2022    Disciplines Attending:  Bedside Nurse,  and     Hardeep Millan was admitted on 5/27/2022  1:10 PM    Anticipated Discharge Date:       Disposition:    Erlin Score:  Erlin Scale Score: 23    Readmission Risk              Risk of Unplanned Readmission:  18           Discussed patient goal for the day, patient clinical progression, and barriers to discharge. The following Goal(s) of the Day/Commitment(s) have been identified:  IV Ancef, hep gtt.       Davida Hopper RN  May 31, 2022

## 2022-05-31 NOTE — CARE COORDINATION
PO zyvox is covered- No prior auth needed. Spoke to Waqas Ramirez at [facility]- medication will be $10 copay and they will fill for patient. 1300: Met with patient at bedside along with social work- discussed plan of care and discharge planning. Pt is independent- resides at home with his family. NO DMe or assistive devices. Pt is requesting [address] for wound care and would like [facility]. Pt started on PO eliquis- pt given eliquis discount cards- Educated pt on the importance of continuing medication regimen with out any delays and/or disruptions- he verbalized understanding. Referral to [facility] at [facility]. [address] orders will be needed upon discharge.

## 2022-05-31 NOTE — PLAN OF CARE
Problem: Pain  Goal: Verbalizes/displays adequate comfort level or baseline comfort level  Outcome: Progressing     Problem: Safety - Adult  Goal: Free from fall injury  Outcome: Progressing     Problem: ABCDS Injury Assessment  Goal: Absence of physical injury  Outcome: Progressing  Flowsheets (Taken 5/31/2022 9976)  Absence of Physical Injury: Implement safety measures based on patient assessment     Problem: Chronic Conditions and Co-morbidities  Goal: Patient's chronic conditions and co-morbidity symptoms are monitored and maintained or improved  Outcome: Progressing  Flowsheets (Taken 5/31/2022 0822)  Care Plan - Patient's Chronic Conditions and Co-Morbidity Symptoms are Monitored and Maintained or Improved: Monitor and assess patient's chronic conditions and comorbid symptoms for stability, deterioration, or improvement     Problem: Discharge Planning  Goal: Discharge to home or other facility with appropriate resources  Outcome: Progressing  Flowsheets (Taken 5/31/2022 8255)  Discharge to home or other facility with appropriate resources: Identify barriers to discharge with patient and caregiver

## 2022-05-31 NOTE — PROGRESS NOTES
Inpatient Cardiology Consultation      Reason for Consult:   New onset A. fib with RVR in the postop period      Consulting Physician: Dr. Barclay Holding      Requesting Physician:    Dr. Nick Rome    He was not previously known to [facility]) cardiology. Date of Consultation: 5/31/2022    Subjective: Denies chest pain or shortness of breath and also denies palpitation. Past Medical History:    Past Medical History:   Diagnosis Date    Hyperlipidemia     Hypertension     Kidney stones     Multiple times    Type II or unspecified type diabetes mellitus without mention of complication, not stated as uncontrolled        Past Surgical History:    Past Surgical History:   Procedure Laterality Date    ABDOMEN SURGERY Left 5/29/2022    INCISION AND DRAINAGE LEFT BACK ABSCESS performed by Daria Norris MD at [address] [place] [address] leg & ankle fx    TONSILLECTOMY  2000       Medications Prior to admit:  Prior to Admission medications    Medication Sig Start Date End Date Taking?  Authorizing Provider   linezolid (ZYVOX) 600 MG tablet Take 1 tablet by mouth 2 times daily for 13 days 5/31/22 6/13/22 Yes Katelyn Chavez MD   lisinopril (PRINIVIL;ZESTRIL) 20 MG tablet TAKE 1 TABLET BY MOUTH EVERY DAY 5/23/22   Gavin Quintanilla MD   escitalopram (LEXAPRO) 10 MG tablet TAKE 1 TABLET BY MOUTH EVERY DAY 5/23/22   Gavin Quintanilla MD   rosuvastatin (CRESTOR) 10 MG tablet TAKE 1 TABLET BY MOUTH EVERYDAY AT BEDTIME 5/23/22   Gavin Quintanilla MD   rosuvastatin (CRESTOR) 20 MG tablet TAKE 1 TABLET BY MOUTH EVERYDAY AT BEDTIME 5/16/22   Gavin Quintanilla MD   Semaglutide 3 MG TABS Take 3 mg by mouth daily Start 7 mg when this Rx is complete 4/21/22   Gavin Quintanilla MD   Semaglutide 7 MG TABS Take 7 mg by mouth daily Begin this Rx when 3 mg Rx is complete 4/21/22   Gavin Quintanilla MD   FARXIGA 10 MG tablet TAKE 1 TABLET BY MOUTH EVERY DAY IN THE MORNING 8/19/21 Nghia Gastelum MD   sitaGLIPtan-metFORMIN (JANUMET)  MG per tablet TAKE 1 TABLET BY MOUTH TWICE A DAY 6/29/21   Nghia Gastelum MD   allopurinol (ZYLOPRIM) 300 MG tablet TAKE 1 TABLET BY MOUTH EVERY DAY 5/17/21   Nghia Gastelum MD   ibuprofen (IBU) 600 MG tablet Take 1 tablet by mouth every 6 hours as needed for Pain 12/19/20 12/24/20  GREGORIO Garrido   omega-3 acid ethyl esters (LOVAZA) 1 g capsule TAKE 2 CAPSULES BY MOUTH TWICE A DAY 12/10/20   Nghia Gastelum MD   allopurinol (ZYLOPRIM) 300 MG tablet TAKE 1 TABLET BY MOUTH EVERY DAY 3/10/20   Nghia Gastelum MD   insulin aspart (NOVOLOG FLEXPEN) 100 UNIT/ML injection pen Up to 45 units QAC 11/6/19   Nghia Gastelum MD   insulin detemir (LEVEMIR FLEXTOUCH) 100 UNIT/ML injection pen Inject 100 Units into the skin 2 times daily 7/29/19   Nghia Gastelum MD   NOVOLOG FLEXPEN 100 UNIT/ML injection pen SCALE < 150--15 UNITS 151-200--20 UNITS 201-250--25 UNITS 251-300--30 UNITS 301-350--35 UNITS 1/17/18   Nghia Gastelum MD   Glucose Blood (BLOOD GLUCOSE TEST STRIPS) STRP Test four times a day 1/6/16   Nghia Gastelum MD   BD ULTRA-FINE PEN NEEDLES 29G X 12.7MM MISC USE 5 TIMES DAILY OR AS DIRECTED 9/2/14   Nghia Gastelum MD   aspirin 81 MG tablet Take 81 mg by mouth daily. Historical Provider, MD   Blood Glucose Monitoring Suppl (ONE TOUCH II TEST STRIP RESENDEZ) by Does not apply route.       Historical Provider, MD       Current Medications:    Current Facility-Administered Medications: collagenase ointment, , Topical, Daily  linezolid (ZYVOX) tablet 600 mg, 600 mg, Oral, 2 times per day  metFORMIN (GLUCOPHAGE) tablet 1,000 mg, 1,000 mg, Oral, BID   alogliptin (NESINA) tablet 25 mg, 25 mg, Oral, Daily  insulin lispro (HUMALOG) injection vial 23 Units, 23 Units, SubCUTAneous, TID WC  heparin (porcine) injection 4,000 Units, 4,000 Units, IntraVENous, PRN  heparin (porcine) injection 2,000 Units, 2,000 Units, IntraVENous, PRN  heparin 25,000 units in dextrose 5% 250 mL (premix) infusion, 5-30 Units/kg/hr, IntraVENous, Continuous  perflutren lipid microspheres (DEFINITY) injection 1.65 mg, 1.5 mL, IntraVENous, ONCE PRN  metoprolol tartrate (LOPRESSOR) tablet 25 mg, 25 mg, Oral, BID  ezetimibe (ZETIA) tablet 10 mg, 10 mg, Oral, Nightly  insulin glargine (LANTUS) injection vial 65 Units, 65 Units, SubCUTAneous, QAM  sodium chloride flush 0.9 % injection 5-40 mL, 5-40 mL, IntraVENous, 2 times per day  sodium chloride flush 0.9 % injection 5-40 mL, 5-40 mL, IntraVENous, PRN  0.9 % sodium chloride infusion, , IntraVENous, PRN  ondansetron (ZOFRAN-ODT) disintegrating tablet 4 mg, 4 mg, Oral, Q8H PRN **OR** ondansetron (ZOFRAN) injection 4 mg, 4 mg, IntraVENous, Q6H PRN  HYDROmorphone (DILAUDID) injection 0.25 mg, 0.25 mg, IntraVENous, Q3H PRN **OR** HYDROmorphone (DILAUDID) injection 0.5 mg, 0.5 mg, IntraVENous, Q3H PRN  oxyCODONE (ROXICODONE) immediate release tablet 5 mg, 5 mg, Oral, Q4H PRN **OR** oxyCODONE (ROXICODONE) immediate release tablet 10 mg, 10 mg, Oral, Q4H PRN  dilTIAZem 125 mg in dextrose 5 % 125 mL infusion, 2.5-15 mg/hr, IntraVENous, Continuous  escitalopram (LEXAPRO) tablet 10 mg, 10 mg, Oral, Daily  melatonin tablet 6 mg, 6 mg, Oral, Nightly  0.9 % sodium chloride infusion, , IntraVENous, Continuous  allopurinol (ZYLOPRIM) tablet 300 mg, 300 mg, Oral, Daily  aspirin EC tablet 81 mg, 81 mg, Oral, Daily  insulin lispro (HUMALOG) injection vial 0-18 Units, 0-18 Units, SubCUTAneous, TID WC  insulin lispro (HUMALOG) injection vial 0-9 Units, 0-9 Units, SubCUTAneous, Nightly  glucose chewable tablet 16 g, 4 tablet, Oral, PRN  dextrose bolus 10% 125 mL, 125 mL, IntraVENous, PRN **OR** dextrose bolus 10% 250 mL, 250 mL, IntraVENous, PRN  glucagon (rDNA) injection 1 mg, 1 mg, IntraMUSCular, PRN  dextrose 5 % solution, 100 mL/hr, IntraVENous, PRN  lisinopril (PRINIVIL;ZESTRIL) tablet 20 mg, 20 mg, Oral, Daily  rosuvastatin (CRESTOR) tablet 20 mg, 20 mg, Oral, Nightly  polyethylene glycol (GLYCOLAX) packet 17 g, 17 g, Oral, Daily PRN  acetaminophen (TYLENOL) tablet 650 mg, 650 mg, Oral, Q6H PRN **OR** acetaminophen (TYLENOL) suppository 650 mg, 650 mg, Rectal, Q6H PRN  magnesium sulfate 2000 mg in 50 mL IVPB premix, 2,000 mg, IntraVENous, PRN  potassium chloride (KLOR-CON M) extended release tablet 40 mEq, 40 mEq, Oral, PRN **OR** potassium bicarb-citric acid (EFFER-K) effervescent tablet 40 mEq, 40 mEq, Oral, PRN **OR** potassium chloride 10 mEq/100 mL IVPB (Peripheral Line), 10 mEq, IntraVENous, PRN  mupirocin (BACTROBAN) 2 % ointment, , Nasal, BID    Allergies:  Sulfa antibiotics    Social History:    Social History     Socioeconomic History    Marital status:      Spouse name: Not on file    Number of children: Not on file    Years of education: Not on file    Highest education level: Not on file   Occupational History    Not on file   Tobacco Use    Smoking status: Never Smoker    Smokeless tobacco: Never Used   Vaping Use    Vaping Use: Never used   Substance and Sexual Activity    Alcohol use: No     Comment: soc    Drug use: No    Sexual activity: Not on file   Other Topics Concern    Not on file   Social History Narrative    Not on file     Social Determinants of Health     Financial Resource Strain: Low Risk     Difficulty of Paying Living Expenses: Not hard at all   Food Insecurity: No Food Insecurity    Worried About Running Out of Food in the Last Year: Never true    Hortencia of Food in the Last Year: Never true   Transportation Needs:     Lack of Transportation (Medical): Not on file    Lack of Transportation (Non-Medical):  Not on file   Physical Activity:     Days of Exercise per Week: Not on file    Minutes of Exercise per Session: Not on file   Stress:     Feeling of Stress : Not on file   Social Connections:     Frequency of Communication with Friends and Family: Not on file    Frequency of Social Gatherings with Friends and Family: Not on file    Attends Rastafari Services: Not on file    Active Member of Clubs or Organizations: Not on file    Attends Club or Organization Meetings: Not on file    Marital Status: Not on file   Intimate Partner Violence:     Fear of Current or Ex-Partner: Not on file    Emotionally Abused: Not on file    Physically Abused: Not on file    Sexually Abused: Not on file   Housing Stability:     Unable to Pay for Housing in the Last Year: Not on file    Number of [place] in the Last Year: Not on file    Unstable Housing in the Last Year: Not on file       Family History:   Family History   Problem Relation Age of Onset    Diabetes Mother     High Blood Pressure Mother     Cancer Father         prostate    Heart Disease Father         MI @ 52yrs old/ also 1 stent       REVIEW OF SYSTEMS:     · Constitutional: Denies fatigue, fevers, chills or night sweats  · Eyes: Denies visual changes or drainage  · ENT: Denies headaches or hearing loss. No mouth sores or sore throat. No epistaxis   · Cardiovascular: Denies chest pain, pressure or palpitations. No lower extremity swelling. · Respiratory: Denies GARCIA, cough, orthopnea or PND. No hemoptysis   · Gastrointestinal: Denies hematemesis or anorexia. No hematochezia or melena    · Genitourinary: Denies urgency, dysuria or hematuria. · Musculoskeletal: Denies gait disturbance, weakness or joint complaints  · Integumentary: Denies rash, hives or pruritis   · Neurological: Denies dizziness, headaches or seizures. No numbness or tingling  · Psychiatric: Denies anxiety or depression. · Endocrine: Denies temperature intolerance. No recent weight change. .  · Hematologic/Lymphatic: Denies abnormal bruising or bleeding.  No swollen lymph nodes    PHYSICAL EXAM:   BP (!) 116/59   Pulse 94   Temp 98.5 °F (36.9 °C) (Oral)   Resp 16   Ht 5' 9\" (1.753 m)   Wt 247 lb 9.6 oz (112.3 kg)   SpO2 95% BMI 36.56 kg/m²   CONST:  Well developed, well nourished who appears of stated age. Awake, alert and cooperative. No apparent distress. HEENT:   Head- Normocephalic, atraumatic   Eyes- Conjunctivae pink, anicteric  Throat- Oral mucosa pink and moist  Neck-  No stridor, trachea midline, no jugular venous distention. No carotid bruit. CHEST: Chest symmetrical and non-tender to palpation. No accessory muscle use or intercostal retractions  RESPIRATORY: Lung sounds - clear throughout fields   CARDIOVASCULAR:     Heart Inspection- shows no noted pulsations  Heart Palpation- no heaves or thrills; PMI is non-displaced   Heart Ausculation-irregularly irregular rate and rhythm, tachycardic no murmur. No s3, s4 or rub   PV: No lower extremity edema. No varicosities. Pedal pulses palpable, no clubbing or cyanosis   ABDOMEN: Soft, non-tender to light palpation. Bowel sounds present. No palpable masses no organomegaly; no abdominal bruit  MS: Good muscle strength and tone. No atrophy or abnormal movements. : Deferred  SKIN: Warm and dry no statis dermatitis or ulcers. Has dressings over the left upper posterior chest.  NEURO / PSYCH: Oriented to person, place and time. Speech clear and appropriate. Follows all commands. Pleasant affect     DATA:    ECG / Tele strips: please see HPI   Diagnostic:    No intake or output data in the 24 hours ending 05/31/22 1723    Labs:   CBC:   Recent Labs     05/30/22  0545 05/31/22  0236   WBC 14.2* 9.8   HGB 12.3* 12.6   HCT 39.3 39.1    373     BMP:   Recent Labs     05/30/22  0545 05/31/22  0236    133   K 3.6 3.6   CO2 24 27   BUN 6 6   CREATININE 0.5* 0.7   LABGLOM >60 >60   CALCIUM 8.3* 7.9*     Mag:   Recent Labs     05/29/22  0327   MG 2.1     Phos: No results for input(s): PHOS in the last 72 hours.   TSH:   Recent Labs     05/30/22  0545   TSH 4.470*     HgA1c:   Lab Results   Component Value Date    LABA1C 12.7 (H) 05/27/2022     No results found for: EAG  proBNP: No results for input(s): PROBNP in the last 72 hours. PT/INR: No results for input(s): PROTIME, INR in the last 72 hours. APTT:  Recent Labs     05/30/22  2325 05/31/22  1042   APTT 29.0 38.6*     CARDIAC ENZYMES:No results for input(s): CKTOTAL, CKMB, CKMBINDEX, TROPONINI in the last 72 hours. FASTING LIPID PANEL:  Lab Results   Component Value Date    CHOL 287 04/19/2022    HDL 47 04/19/2022    LDLCALC 162 04/19/2022    TRIG 392 04/19/2022     LIVER PROFILE:  No results for input(s): AST, ALT, LABALBU in the last 72 hours. Data review:  Telemetry-A. fib with RVR heart rate in the 120s    EKG: Sinus tachycardia, nonspecific ST-T changes, abnormal EKG    Labs and vitals were reviewed: Blood pressure 136/67 heart rate 103 sats 93%    Labs- BUNs/creatinine 6 last 0.5 rest of the chemistry normal except for glucose of 229.   Liver functions normal.  TSH 4.47, WBC 18.1>> 14.2, H&H 12.3/39.3    Impression:  · New onset A. fib with RVR  Uptitrate beta-blocker for optimal rate control and wean off diltiazem drip  Continue on heparin drip for anticoagulation and initiate Eliquis 5 mg p.o. twice a day when ready to be discharged  Awaiting echocardiogram to guide therapy  Outpatient sleep apnea evaluation  · [id number] Vasc score 2  · Right upper chest abscess with cellulitis status post I&D on IV antibiotics  Management per primary and surgery    · Type 2 diabetes on insulin  Management per primary service  · Hypertension, stable  Management per primary service  · Hyperlipidemia on statin  · History of renal stones        Electronically signed by Alvaro Miranda MD on 5/31/2022 at 5:23 PM

## 2022-05-31 NOTE — PROGRESS NOTES
[address] [place] [address] [facility]  [place]  Progress Note    SUBJECTIVE:  Chief Complaint   Patient presents with    Abscess     back     Patient sitting on edge of bed, wound care nurse present  In no distress. No fevers  Tolerating current antibiotics     Review of systems:  As stated above in the chief complaint, otherwise negative.     Medications:  Scheduled Meds:   collagenase   Topical Daily    metoprolol tartrate  25 mg Oral BID    ezetimibe  10 mg Oral Nightly    insulin glargine  65 Units SubCUTAneous QAM    insulin lispro  18 Units SubCUTAneous TID WC    sodium chloride flush  5-40 mL IntraVENous 2 times per day    escitalopram  10 mg Oral Daily    melatonin  6 mg Oral Nightly    allopurinol  300 mg Oral Daily    aspirin  81 mg Oral Daily    insulin lispro  0-18 Units SubCUTAneous TID WC    insulin lispro  0-9 Units SubCUTAneous Nightly    lisinopril  20 mg Oral Daily    rosuvastatin  20 mg Oral Nightly    sodium chloride flush  5-40 mL IntraVENous 2 times per day    ceFAZolin  2,000 mg IntraVENous Q8H    mupirocin   Nasal BID     Continuous Infusions:   heparin (PORCINE) Infusion 16.77 Units/kg/hr (05/31/22 0415)    sodium chloride      dilTIAZem Stopped (05/31/22 0710)    sodium chloride 75 mL/hr at 05/31/22 0849    dextrose      sodium chloride       PRN Meds:heparin (porcine), heparin (porcine), perflutren lipid microspheres, sodium chloride flush, sodium chloride, ondansetron **OR** ondansetron, HYDROmorphone **OR** HYDROmorphone, oxyCODONE **OR** oxyCODONE, glucose, dextrose bolus **OR** dextrose bolus, glucagon (rDNA), dextrose, sodium chloride flush, sodium chloride, ondansetron **OR** ondansetron, polyethylene glycol, acetaminophen **OR** acetaminophen, magnesium sulfate, potassium chloride **OR** potassium alternative oral replacement **OR** potassium chloride    OBJECTIVE:  BP (!) 116/59   Pulse 94   Temp 98.5 °F (36.9 °C) (Oral)   Resp 19   Ht 5' 9\" (1.753 m)   Wt 247 lb 9.6 oz (112.3 kg)   SpO2 95%   BMI 36.56 kg/m²   Temp  Av.6 °F (37 °C)  Min: 98.2 °F (36.8 °C)  Max: 99 °F (37.2 °C)  Constitutional: The patient is awake, alert, and oriented. In no distress  Skin: Warm and dry. No rashes were noted. L upper back wound - see picture  HEENT: Round and reactive pupils. Moist mucous membranes. No ulcerations or thrush. Neck: Supple to movements. Chest: No use of accessory muscles to breathe. Symmetrical expansion. No wheezing, crackles or rhonchi. Cardiovascular: S1 and S2 are rhythmic and regular. No murmurs appreciated. Abdomen: Positive bowel sounds to auscultation. Benign to palpation. No masses felt. Extremities: No edema.   Lines: peripheral        Laboratory and Tests Review:  Lab Results   Component Value Date    WBC 9.8 2022    WBC 14.2 (H) 2022    WBC 18.1 (H) 2022    HGB 12.6 2022    HCT 39.1 2022    MCV 88.9 2022     2022     Lab Results   Component Value Date    NEUTROABS 6.28 2022    NEUTROABS 9.96 (H) 2022    NEUTROABS 14.39 (H) 2022     No results found for: [facility]  Lab Results   Component Value Date    ALT 14 2022    AST 10 2022    ALKPHOS 154 (H) 2022    BILITOT 0.7 2022     Lab Results   Component Value Date     2022    K 3.6 2022    CL 96 2022    CO2 27 2022    BUN 6 2022    CREATININE 0.7 2022    CREATININE 0.5 2022    CREATININE 0.5 2022    GFRAA >60 2022    LABGLOM >60 2022    GLUCOSE 383 2022    PROT 7.8 2022    LABALBU 3.8 2022    CALCIUM 7.9 2022    BILITOT 0.7 2022    ALKPHOS 154 2022    AST 10 2022    ALT 14 2022     Lab Results   Component Value Date    CRP 43.9 (H) 2022     Lab Results   Component Value Date    SEDRATE 51 (H) 2022     Radiology:  Reviewed     Microbiology:   Blood Cultures 22: negative so far  Surgical Cultures 5/29/22: pending Gram Stain: gram positive cocci in pairs, gram positive cocci in clusters, gram positive cocci-M-SA    ASSESSMENT:  · Recurrent furuncles and carbuncle-back  · Cellulitis of the back  · Historically has been MSSA  · Hyperglycemia associated with the above  · Leukocytosis associated with the above, improved     Plan:    · Consolidate antibiotics to Zyvox - reconciled;'s final sensitivities are not back yet and cephalexin may not be well absorbed in the big individual of 250 pounds. · I&D of skin, soft tissue and muscle 5/29/22  · Local wound care  · Strong hygiene educational program given to the patient  · Check surgical cultures and monitor labs  · Will follow with you  · Ok to discharge from [address] [place] [address]  10:26 AM  5/31/2022    Pt seen and examined. Above discussed agree with advanced practice nurse. Labs, cultures, and radiographs reviewed. Face to Face encounter occurred. Changes made as necessary.      Bel Armenta MD

## 2022-05-31 NOTE — PROGRESS NOTES
[facility] Progress Note    Admitting Date and Time: 5/27/2022  1:10 PM  Admit Dx: Cellulitis and abscess of trunk [L03.319, G21.364]  Hypochloremia [E87.8]  Hyponatremia [E87.1]  Back abscess [L02.212]  Abscess of left shoulder [L02.414]  Diabetic ketoacidosis without coma associated with type 2 diabetes mellitus ([address] [E11.10]    Subjective:  Patient is being followed for Cellulitis and abscess of trunk [L03.319, R63.473]  Hypochloremia [E87.8]  Hyponatremia [E87.1]  Back abscess [L02.212]  Abscess of left shoulder [L02.414]  Diabetic ketoacidosis without coma associated with type 2 diabetes mellitus ([facility] [address] [E11.10]   Pt feels good. Still on Heparin gtt. Per RN:  No new concerns. Zyvox covered ($10 copay). Eliquis needed. ROS: denies fever, chills, cp, sob, n/v, HA unless stated above.       collagenase   Topical Daily    linezolid  600 mg Oral 2 times per day    metFORMIN  1,000 mg Oral BID WC    alogliptin  25 mg Oral Daily    metoprolol tartrate  25 mg Oral BID    ezetimibe  10 mg Oral Nightly    insulin glargine  65 Units SubCUTAneous QAM    insulin lispro  18 Units SubCUTAneous TID WC    sodium chloride flush  5-40 mL IntraVENous 2 times per day    escitalopram  10 mg Oral Daily    melatonin  6 mg Oral Nightly    allopurinol  300 mg Oral Daily    aspirin  81 mg Oral Daily    insulin lispro  0-18 Units SubCUTAneous TID WC    insulin lispro  0-9 Units SubCUTAneous Nightly    lisinopril  20 mg Oral Daily    rosuvastatin  20 mg Oral Nightly    mupirocin   Nasal BID     heparin (porcine), 4,000 Units, PRN  heparin (porcine), 2,000 Units, PRN  perflutren lipid microspheres, 1.5 mL, ONCE PRN  sodium chloride flush, 5-40 mL, PRN  sodium chloride, , PRN  ondansetron, 4 mg, Q8H PRN   Or  ondansetron, 4 mg, Q6H PRN  HYDROmorphone, 0.25 mg, Q3H PRN   Or  HYDROmorphone, 0.5 mg, Q3H PRN  oxyCODONE, 5 mg, Q4H PRN   Or  oxyCODONE, 10 mg, Q4H PRN  glucose, 4 tablet, PRN  dextrose bolus, 125 mL, PRN   Or  dextrose bolus, 250 mL, PRN  glucagon (rDNA), 1 mg, PRN  dextrose, 100 mL/hr, PRN  polyethylene glycol, 17 g, Daily PRN  acetaminophen, 650 mg, Q6H PRN   Or  acetaminophen, 650 mg, Q6H PRN  magnesium sulfate, 2,000 mg, PRN  potassium chloride, 40 mEq, PRN   Or  potassium alternative oral replacement, 40 mEq, PRN   Or  potassium chloride, 10 mEq, PRN         Objective:    BP (!) 116/59   Pulse 94   Temp 98.5 °F (36.9 °C) (Oral)   Resp 18   Ht 5' 9\" (1.753 m)   Wt 247 lb 9.6 oz (112.3 kg)   SpO2 95%   BMI 36.56 kg/m²     General Appearance: alert and oriented to person, place and time and in no acute distress  Skin: warm and dry  Head: normocephalic and atraumatic  Eyes: pupils equal, round, and reactive to light, extraocular eye movements intact, conjunctivae normal  Neck: neck supple and non tender without mass   Pulmonary/Chest: clear to auscultation bilaterally- no wheezes, rales or rhonchi, normal air movement, no respiratory distress  Cardiovascular: normal rate, normal S1 and S2 and no carotid bruits  Abdomen: soft, non-tender, non-distended, normal bowel sounds, no masses or organomegaly  Extremities: no cyanosis, no clubbing and no edema  Neurologic: no cranial nerve deficit and speech normal        Recent Labs     05/29/22  0327 05/30/22  0545 05/31/22  0236    133 133   K 3.4* 3.6 3.6    98 96*   CO2 24 24 27   BUN 6 6 6   CREATININE 0.5* 0.5* 0.7   GLUCOSE 154* 229* 383*   CALCIUM 7.3* 8.3* 7.9*       Recent Labs     05/29/22  0955 05/30/22  0545 05/31/22  0236   WBC 18.1* 14.2* 9.8   RBC 4.74 4.28 4.40   HGB 13.9 12.3* 12.6   HCT 42.1 39.3 39.1   MCV 88.8 91.8 88.9   MCH 29.3 28.7 28.6   MCHC 33.0 31.3* 32.2   RDW 12.8 13.2 13.1    315 373   MPV 10.6 11.2 10.3       Radiology:   No results found.     Assessment:    Principal Problem:    Necrotizing soft tissue infection  Active Problems:    Back abscess    Abscess of left shoulder    Cellulitis of back Pulmonary nodule    Atrial fibrillation, rapid ([facility] [address]    Poorly controlled type 2 diabetes mellitus ([facility] [address]    HTN (hypertension)    Hyperlipidemia  Resolved Problems:    * No resolved hospital problems. *    Plan:  1. Infected Furuncles and Carbuncles, back - drsg in place. Continue IV antibiotics. Transition to oral doxycycline on discharge. 2.  Atrial fibrillation, new - off IV Cardizem and oral Digoxin. Oral Metoprolol 25 mg po bid. HR < 100.   3.  T2DM/Insulin Resistance/Metabolic Syndrome - Endocrinology recommendations appreciated. Currently on 65 U of Lantus q am.  Humalog 18 U with meals. HD insulin SS. Last HgbA1C on 5/27/22 was 12.7%  4. Subclinical hypothyroidism - will need to be monitored as an outpatient. Recheck TSH in 1 month. 5.  Hyperlipidemia - on a statin. Last Lipid panel on 4/19/22 - 287/392/47/162. Continue Zetia. Monitor for side effects. NOTE: This report was transcribed using voice recognition software. Every effort was made to ensure accuracy; however, inadvertent computerized transcription errors may be present.     Electronically signed by Irving Velazquez MD on 5/31/2022 at 1:25 PM

## 2022-05-31 NOTE — HOME CARE
Received referral for home health. Next start of care is Friday 6/3/22. Case management aware. Spoke with patient verified demos and discussed home health services. Patient will need home health orders that state home care to start 6/3/22 prior to discharge.      Thank you, [facility]

## 2022-05-31 NOTE — PROGRESS NOTES
General Surgery Progress Note    CC: back abscesss    Subjective:   Patient continues to feel better. He has no complaints this morning. Heart rate is within normal limits    OBJECTIVE      Physical    BP (!) 141/83   Pulse 82   Temp 98.2 °F (36.8 °C) (Oral)   Resp 18   Ht 5' 9\" (1.753 m)   Wt 247 lb 9.6 oz (112.3 kg)   SpO2 97%   BMI 36.56 kg/m²       General appearance: appears in no acute distress  Lungs:respiratory effort normal without accessory muscles. On room air  Heart: no pedal edema. Warm throughout  Abdomen: soft, nondistended, nontympanic, no guarding, no peritoneal signs, normoactive bowel sounds, looks good  Extremities: ROM normal  Back: wound s/p excisional debridement. Fibrinous exudate with mild amount of purulent drainage present. No malodor    ASSESSMENT: POD 2 excisional debridement [id number] for NSTI of back wound    PLAN:    - packing changes twice daily  - apply Santyl to wound once daily  - wound care consult  - antibiotics per ELMER Minaya DO 5/31/2022 8:03 AM     I saw and examined the patient. I reviewed the above resident's note. I reviewed all labs, radiology, and all test results listed above. I agree with the assessment and plan as outlined. Ok for [employer] from surgical perspective  Santyl and aquacel daily  Follow up in the wound clinic in 1 week.     Lowell Smith MD  General Surgery

## 2022-05-31 NOTE — FLOWSHEET NOTE
Initial Inpatient Wound Care    Admit Date: 5/27/2022  1:10 PM    Reason for consult:  back abscess s/p I&D    Significant history:  Incision and drainage with excisional debridement of skin, soft tissue, and muscle ([id number]) of upper left back thorax 5/28/22  Wound history: adm with abscess. Post I&D abscess  Findings: alert and oriented. Sitting on side of bed    05/31/22 1052   Wound 05/31/22 Shoulder Posterior; Left   Date First Assessed/Time First Assessed: 05/31/22 1613   Present on Hospital Admission: (c) Yes  Primary Wound Type: Surgical Type  Location: Shoulder  Wound Location Orientation: Posterior; Left   Wound Image    Dressing Status New dressing applied   Wound Cleansed Cleansed with saline   Dressing/Treatment   (Mesalt rope, 4x4 gauze)   Dressing Change Due 06/01/22   Wound Length (cm) 2.5 cm   Wound Width (cm) 6.5 cm   Wound Depth (cm) 3 cm   Wound Surface Area (cm^2) 16.25 cm^2   Wound Volume (cm^3) 48.75 cm^3   Undermining Starts ___ O'Clock 2   Undermining Ends___ O'Clock 5   Undermining Maxium Distance (cm) 1.5   Wound Assessment   (red/yellow)   Drainage Description Thick; Yellow   Odor Mild   Soco-wound Assessment   (discolored)   Wound Thickness Description not for Pressure Injury Full thickness   [REMOVED] Incision 05/29/22 Back Left;Upper   Final Assessment Date/Final Assessment Time: 05/31/22 1612  Date First Assessed/Time First Assessed: 05/29/22 1330   Present on Hospital Admission: No  Location: Back  Incision Location Orientation: Left;Upper  Incision Description (Comments): INCISIO. .. Dressing Status Clean;Dry; Intact   Dressing Change Due   (changed today by wound care nurse)       Plan: santyl ordered. Mesalt rope dressings  Dietician  Maile Vargas.  LEESA Corona, [address] - CNS 5/31/2022 4:16 PM

## 2022-06-01 VITALS
HEART RATE: 90 BPM | RESPIRATION RATE: 18 BRPM | SYSTOLIC BLOOD PRESSURE: 156 MMHG | OXYGEN SATURATION: 98 % | TEMPERATURE: 98.2 F | HEIGHT: 69 IN | WEIGHT: 245.8 LBS | BODY MASS INDEX: 36.4 KG/M2 | DIASTOLIC BLOOD PRESSURE: 80 MMHG

## 2022-06-01 LAB
ANION GAP SERPL CALCULATED.3IONS-SCNC: 10 MMOL/L (ref 7–16)
APTT: 39.4 SEC (ref 24.5–35.1)
APTT: 43.2 SEC (ref 24.5–35.1)
BASOPHILS ABSOLUTE: 0.07 E9/L (ref 0–0.2)
BASOPHILS RELATIVE PERCENT: 0.8 % (ref 0–2)
BLOOD CULTURE, ROUTINE: NORMAL
BODY FLUID CULTURE, STERILE: ABNORMAL
BUN BLDV-MCNC: 5 MG/DL (ref 6–20)
CALCIUM SERPL-MCNC: 8.4 MG/DL (ref 8.6–10.2)
CHLORIDE BLD-SCNC: 100 MMOL/L (ref 98–107)
CO2: 29 MMOL/L (ref 22–29)
CREAT SERPL-MCNC: 0.6 MG/DL (ref 0.7–1.2)
CULTURE, BLOOD 2: NORMAL
EKG ATRIAL RATE: 125 BPM
EKG ATRIAL RATE: 144 BPM
EKG Q-T INTERVAL: 306 MS
EKG Q-T INTERVAL: 344 MS
EKG QRS DURATION: 92 MS
EKG QRS DURATION: 94 MS
EKG QTC CALCULATION (BAZETT): 467 MS
EKG QTC CALCULATION (BAZETT): 470 MS
EKG R AXIS: 50 DEGREES
EKG R AXIS: 51 DEGREES
EKG T AXIS: -73 DEGREES
EKG T AXIS: 1 DEGREES
EKG VENTRICULAR RATE: 111 BPM
EKG VENTRICULAR RATE: 142 BPM
EOSINOPHILS ABSOLUTE: 0.25 E9/L (ref 0.05–0.5)
EOSINOPHILS RELATIVE PERCENT: 2.8 % (ref 0–6)
GFR AFRICAN AMERICAN: >60
GFR NON-AFRICAN AMERICAN: >60 ML/MIN/1.73
GLUCOSE BLD-MCNC: 179 MG/DL (ref 74–99)
GRAM STAIN RESULT: ABNORMAL
HCT VFR BLD CALC: 39.2 % (ref 37–54)
HEMOGLOBIN: 12.6 G/DL (ref 12.5–16.5)
IMMATURE GRANULOCYTES #: 0.24 E9/L
IMMATURE GRANULOCYTES %: 2.6 % (ref 0–5)
LV EF: 58 %
LVEF MODALITY: NORMAL
LYMPHOCYTES ABSOLUTE: 1.91 E9/L (ref 1.5–4)
LYMPHOCYTES RELATIVE PERCENT: 21 % (ref 20–42)
MCH RBC QN AUTO: 28.7 PG (ref 26–35)
MCHC RBC AUTO-ENTMCNC: 32.1 % (ref 32–34.5)
MCV RBC AUTO: 89.3 FL (ref 80–99.9)
METER GLUCOSE: 157 MG/DL (ref 74–99)
METER GLUCOSE: 83 MG/DL (ref 74–99)
MONOCYTES ABSOLUTE: 1.4 E9/L (ref 0.1–0.95)
MONOCYTES RELATIVE PERCENT: 15.4 % (ref 2–12)
NEUTROPHILS ABSOLUTE: 5.22 E9/L (ref 1.8–7.3)
NEUTROPHILS RELATIVE PERCENT: 57.4 % (ref 43–80)
ORGANISM: ABNORMAL
PDW BLD-RTO: 13 FL (ref 11.5–15)
PLATELET # BLD: 471 E9/L (ref 130–450)
PMV BLD AUTO: 9.9 FL (ref 7–12)
POTASSIUM REFLEX MAGNESIUM: 4.1 MMOL/L (ref 3.5–5)
RBC # BLD: 4.39 E12/L (ref 3.8–5.8)
SODIUM BLD-SCNC: 139 MMOL/L (ref 132–146)
WBC # BLD: 9.1 E9/L (ref 4.5–11.5)

## 2022-06-01 PROCEDURE — 99233 SBSQ HOSP IP/OBS HIGH 50: CPT | Performed by: INTERNAL MEDICINE

## 2022-06-01 PROCEDURE — 6370000000 HC RX 637 (ALT 250 FOR IP): Performed by: REGISTERED NURSE

## 2022-06-01 PROCEDURE — 80048 BASIC METABOLIC PNL TOTAL CA: CPT

## 2022-06-01 PROCEDURE — 6370000000 HC RX 637 (ALT 250 FOR IP): Performed by: FAMILY MEDICINE

## 2022-06-01 PROCEDURE — 85730 THROMBOPLASTIN TIME PARTIAL: CPT

## 2022-06-01 PROCEDURE — 6370000000 HC RX 637 (ALT 250 FOR IP): Performed by: STUDENT IN AN ORGANIZED HEALTH CARE EDUCATION/TRAINING PROGRAM

## 2022-06-01 PROCEDURE — 93306 TTE W/DOPPLER COMPLETE: CPT

## 2022-06-01 PROCEDURE — 99239 HOSP IP/OBS DSCHRG MGMT >30: CPT | Performed by: FAMILY MEDICINE

## 2022-06-01 PROCEDURE — 85025 COMPLETE CBC W/AUTO DIFF WBC: CPT

## 2022-06-01 PROCEDURE — 6370000000 HC RX 637 (ALT 250 FOR IP): Performed by: INTERNAL MEDICINE

## 2022-06-01 PROCEDURE — 6370000000 HC RX 637 (ALT 250 FOR IP): Performed by: TRANSPLANT SURGERY

## 2022-06-01 PROCEDURE — 6370000000 HC RX 637 (ALT 250 FOR IP)

## 2022-06-01 PROCEDURE — 36415 COLL VENOUS BLD VENIPUNCTURE: CPT

## 2022-06-01 PROCEDURE — 2580000003 HC RX 258: Performed by: TRANSPLANT SURGERY

## 2022-06-01 PROCEDURE — 82962 GLUCOSE BLOOD TEST: CPT

## 2022-06-01 PROCEDURE — 6360000002 HC RX W HCPCS: Performed by: FAMILY MEDICINE

## 2022-06-01 RX ORDER — INSULIN GLARGINE 100 [IU]/ML
45 INJECTION, SOLUTION SUBCUTANEOUS EVERY MORNING
Status: DISCONTINUED | OUTPATIENT
Start: 2022-06-02 | End: 2022-06-01 | Stop reason: HOSPADM

## 2022-06-01 RX ORDER — INSULIN ASPART 100 [IU]/ML
INJECTION, SOLUTION INTRAVENOUS; SUBCUTANEOUS
Qty: 15 PEN | Refills: 3 | Status: SHIPPED | OUTPATIENT
Start: 2022-06-01 | End: 2022-10-03 | Stop reason: SDUPTHER

## 2022-06-01 RX ORDER — SITAGLIPTIN AND METFORMIN HYDROCHLORIDE 1000; 50 MG/1; MG/1
1 TABLET, FILM COATED ORAL 2 TIMES DAILY WITH MEALS
Qty: 180 TABLET | Refills: 3 | Status: SHIPPED | OUTPATIENT
Start: 2022-06-01 | End: 2022-06-08

## 2022-06-01 RX ORDER — POLYETHYLENE GLYCOL 3350 17 G/17G
17 POWDER, FOR SOLUTION ORAL DAILY PRN
Qty: 527 G | Refills: 0 | COMMUNITY
Start: 2022-06-01 | End: 2022-06-14

## 2022-06-01 RX ORDER — INSULIN GLARGINE 100 [IU]/ML
50 INJECTION, SOLUTION SUBCUTANEOUS EVERY MORNING
Qty: 10 ML | Refills: 3 | Status: SHIPPED | OUTPATIENT
Start: 2022-06-02 | End: 2022-06-01 | Stop reason: HOSPADM

## 2022-06-01 RX ORDER — INSULIN LISPRO 100 [IU]/ML
15 INJECTION, SOLUTION INTRAVENOUS; SUBCUTANEOUS
Status: DISCONTINUED | OUTPATIENT
Start: 2022-06-01 | End: 2022-06-01 | Stop reason: HOSPADM

## 2022-06-01 RX ORDER — INSULIN LISPRO 100 [IU]/ML
18 INJECTION, SOLUTION INTRAVENOUS; SUBCUTANEOUS
Qty: 15 ML | Refills: 0 | Status: SHIPPED | OUTPATIENT
Start: 2022-06-01 | End: 2022-06-01 | Stop reason: HOSPADM

## 2022-06-01 RX ORDER — OXYCODONE HYDROCHLORIDE 5 MG/1
5 TABLET ORAL EVERY 6 HOURS PRN
Qty: 20 TABLET | Refills: 0 | Status: SHIPPED | OUTPATIENT
Start: 2022-06-01 | End: 2022-06-06

## 2022-06-01 RX ORDER — INSULIN DETEMIR 100 [IU]/ML
INJECTION, SOLUTION SUBCUTANEOUS
Qty: 15 PEN | Refills: 3 | Status: SHIPPED | OUTPATIENT
Start: 2022-06-01 | End: 2022-10-03 | Stop reason: SDUPTHER

## 2022-06-01 RX ADMIN — ESCITALOPRAM OXALATE 10 MG: 10 TABLET ORAL at 09:09

## 2022-06-01 RX ADMIN — SODIUM CHLORIDE, PRESERVATIVE FREE 10 ML: 5 INJECTION INTRAVENOUS at 09:16

## 2022-06-01 RX ADMIN — HEPARIN SODIUM 22.51 UNITS/KG/HR: 10000 INJECTION, SOLUTION INTRAVENOUS at 11:59

## 2022-06-01 RX ADMIN — INSULIN GLARGINE 50 UNITS: 100 INJECTION, SOLUTION SUBCUTANEOUS at 09:11

## 2022-06-01 RX ADMIN — INSULIN LISPRO 3 UNITS: 100 INJECTION, SOLUTION INTRAVENOUS; SUBCUTANEOUS at 07:49

## 2022-06-01 RX ADMIN — LISINOPRIL 20 MG: 20 TABLET ORAL at 09:09

## 2022-06-01 RX ADMIN — METFORMIN HYDROCHLORIDE 1000 MG: 1000 TABLET ORAL at 07:46

## 2022-06-01 RX ADMIN — OXYCODONE 10 MG: 5 TABLET ORAL at 02:59

## 2022-06-01 RX ADMIN — HEPARIN SODIUM 20.51 UNITS/KG/HR: 10000 INJECTION, SOLUTION INTRAVENOUS at 07:21

## 2022-06-01 RX ADMIN — ALOGLIPTIN 25 MG: 25 TABLET, FILM COATED ORAL at 09:10

## 2022-06-01 RX ADMIN — INSULIN LISPRO 18 UNITS: 100 INJECTION, SOLUTION INTRAVENOUS; SUBCUTANEOUS at 07:51

## 2022-06-01 RX ADMIN — METOPROLOL TARTRATE 25 MG: 25 TABLET, FILM COATED ORAL at 09:09

## 2022-06-01 RX ADMIN — OXYCODONE 10 MG: 5 TABLET ORAL at 07:46

## 2022-06-01 RX ADMIN — OXYCODONE 10 MG: 5 TABLET ORAL at 12:04

## 2022-06-01 RX ADMIN — ASPIRIN 81 MG: 81 TABLET, COATED ORAL at 09:09

## 2022-06-01 RX ADMIN — ALLOPURINOL 300 MG: 300 TABLET ORAL at 09:09

## 2022-06-01 RX ADMIN — HEPARIN SODIUM 2000 UNITS: 1000 INJECTION INTRAVENOUS; SUBCUTANEOUS at 04:43

## 2022-06-01 RX ADMIN — HEPARIN SODIUM 2000 UNITS: 1000 INJECTION INTRAVENOUS; SUBCUTANEOUS at 11:53

## 2022-06-01 RX ADMIN — LINEZOLID 600 MG: 600 TABLET, FILM COATED ORAL at 09:10

## 2022-06-01 RX ADMIN — COLLAGENASE SANTYL: 250 OINTMENT TOPICAL at 09:15

## 2022-06-01 RX ADMIN — APIXABAN 5 MG: 5 TABLET, FILM COATED ORAL at 13:37

## 2022-06-01 RX ADMIN — INSULIN LISPRO 18 UNITS: 100 INJECTION, SOLUTION INTRAVENOUS; SUBCUTANEOUS at 11:46

## 2022-06-01 RX ADMIN — MUPIROCIN: 20 OINTMENT TOPICAL at 09:15

## 2022-06-01 ASSESSMENT — PAIN SCALES - GENERAL
PAINLEVEL_OUTOF10: 7

## 2022-06-01 ASSESSMENT — PAIN DESCRIPTION - ORIENTATION
ORIENTATION: LEFT
ORIENTATION: LEFT
ORIENTATION: LEFT;UPPER
ORIENTATION: LEFT

## 2022-06-01 ASSESSMENT — PAIN DESCRIPTION - LOCATION
LOCATION: BACK

## 2022-06-01 ASSESSMENT — PAIN - FUNCTIONAL ASSESSMENT: PAIN_FUNCTIONAL_ASSESSMENT: PREVENTS OR INTERFERES SOME ACTIVE ACTIVITIES AND ADLS

## 2022-06-01 ASSESSMENT — PAIN DESCRIPTION - DESCRIPTORS
DESCRIPTORS: ACHING;PRESSURE
DESCRIPTORS: ACHING

## 2022-06-01 NOTE — CARE COORDINATION
Chart reviewed and discussed with nursing- Pt remains on IV heparin infusion. Anticipated transition to PO eliquis per cardio. Discharge plan remains HOME with [facility]. [address] orders will be needed upon discharge. Pt is independent resides at home with family. PO zyvox is covered- No prior auth needed. Spoke to Queenie Serrano at [facility]- medication will be $10 copay and they will fill for patient. 1251: Notified Pawel Contreras at [facility] that patient will discharge. [address] orders obtained. Javi confirmed that they will see patient tomorrow.

## 2022-06-01 NOTE — PLAN OF CARE
Problem: Pain  Goal: Verbalizes/displays adequate comfort level or baseline comfort level  6/1/2022 1235 by Christiano Moreno RN  Outcome: Completed  6/1/2022 0236 by Emily Antonio RN  Outcome: Progressing     Problem: Safety - Adult  Goal: Free from fall injury  6/1/2022 1235 by Christiano Moreno RN  Outcome: Completed  6/1/2022 0236 by Emily Antonio RN  Outcome: Progressing  Flowsheets (Taken 6/1/2022 0122)  Free From Fall Injury: Instruct family/caregiver on patient safety     Problem: ABCDS Injury Assessment  Goal: Absence of physical injury  6/1/2022 1235 by Christiano Moreno RN  Outcome: Completed  Flowsheets (Taken 6/1/2022 0735 by Rishabh Gallego RN)  Absence of Physical Injury: Implement safety measures based on patient assessment  6/1/2022 0236 by Emily Antonio RN  Outcome: Progressing     Problem: Chronic Conditions and Co-morbidities  Goal: Patient's chronic conditions and co-morbidity symptoms are monitored and maintained or improved  6/1/2022 1235 by Christiano Moreno RN  Outcome: Completed  Flowsheets (Taken 6/1/2022 0735 by Rishabh Gallego RN)  Care Plan - Patient's Chronic Conditions and Co-Morbidity Symptoms are Monitored and Maintained or Improved: Monitor and assess patient's chronic conditions and comorbid symptoms for stability, deterioration, or improvement  6/1/2022 0236 by Emily Antonio RN  Outcome: Progressing     Problem: Discharge Planning  Goal: Discharge to home or other facility with appropriate resources  6/1/2022 1235 by Christiano Moreno RN  Outcome: Completed  Flowsheets (Taken 6/1/2022 0735 by Rishabh Gallego RN)  Discharge to home or other facility with appropriate resources: Identify barriers to discharge with patient and caregiver  6/1/2022 0236 by Emily Antonio RN  Outcome: Progressing

## 2022-06-01 NOTE — PLAN OF CARE
Problem: Pain  Goal: Verbalizes/displays adequate comfort level or baseline comfort level  6/1/2022 0236 by Dannie Vazquez RN  Outcome: Progressing  5/31/2022 1419 by Cary Davila RN  Outcome: Progressing     Problem: Safety - Adult  Goal: Free from fall injury  6/1/2022 0236 by Dannie Vazquez RN  Outcome: Progressing  Flowsheets (Taken 6/1/2022 0122)  Free From Fall Injury: Instruct family/caregiver on patient safety  5/31/2022 1419 by Cary Davila RN  Outcome: Progressing     Problem: ABCDS Injury Assessment  Goal: Absence of physical injury  6/1/2022 0236 by Dannie Vazquez RN  Outcome: Progressing  5/31/2022 1419 by Cary Davila RN  Outcome: Progressing  Flowsheets (Taken 5/31/2022 6152)  Absence of Physical Injury: Implement safety measures based on patient assessment     Problem: Chronic Conditions and Co-morbidities  Goal: Patient's chronic conditions and co-morbidity symptoms are monitored and maintained or improved  6/1/2022 0236 by Dannie Vazquez RN  Outcome: Progressing  5/31/2022 1419 by Cary Davila RN  Outcome: Progressing  Flowsheets (Taken 5/31/2022 9804)  Care Plan - Patient's Chronic Conditions and Co-Morbidity Symptoms are Monitored and Maintained or Improved: Monitor and assess patient's chronic conditions and comorbid symptoms for stability, deterioration, or improvement     Problem: Discharge Planning  Goal: Discharge to home or other facility with appropriate resources  6/1/2022 0236 by Dannie Vazquez RN  Outcome: Progressing  5/31/2022 1419 by Cary Davila RN  Outcome: Progressing  Flowsheets (Taken 5/31/2022 5869)  Discharge to home or other facility with appropriate resources: Identify barriers to discharge with patient and caregiver

## 2022-06-01 NOTE — PROGRESS NOTES
Spoke with Katia KWOK re: echo completed but not read. States she spoke with Dr. Edouard Kimball and the patient can be discharged from cardiology standpoint without the echo being read. Follow- up with Dr. Edouard Kimball in one month.

## 2022-06-01 NOTE — PROGRESS NOTES
P Quality Flow/Interdisciplinary Rounds Progress Note        Quality Flow Rounds held on June 1, 2022    Disciplines Attending:  Bedside Nurse,  and     Caitlin Sabillon was admitted on 5/27/2022  1:10 PM                  Anticipated Discharge Date:   6/2/2022    Disposition:    Erlin Score:  Erlin Scale Score: 22    Readmission Risk              Risk of Unplanned Readmission:  18           Discussed patient goal for the day, patient clinical progression, and barriers to discharge. The following Goal(s) of the Day/Commitment(s) have been identified:   Teach wound packing, continue Heparin drip.        Camden Campuzano RN  June 1, 2022

## 2022-06-01 NOTE — PROGRESS NOTES
Inpatient Cardiology Consultation      Reason for Consult:   New onset A. fib with RVR in the postop period      Consulting Physician: Dr. Yahaira Steven      Requesting Physician:    Dr. Jay Weeks    He was not previously known to [facility]) cardiology. Date of Consultation: 6/1/2022    Subjective: Denies chest pain or shortness of breath and also denies palpitation. Follow-up with cardiology for ischemic evaluation and echocardiogram once recovered from acute illness. Past Medical History:    Past Medical History:   Diagnosis Date    Hyperlipidemia     Hypertension     Kidney stones     Multiple times    Type II or unspecified type diabetes mellitus without mention of complication, not stated as uncontrolled        Past Surgical History:    Past Surgical History:   Procedure Laterality Date    ABDOMEN SURGERY Left 5/29/2022    INCISION AND DRAINAGE LEFT BACK ABSCESS performed by Darell De MD at [address] [place] [address]    Lt leg & ankle fx    TONSILLECTOMY  2000       Medications Prior to admit:  Prior to Admission medications    Medication Sig Start Date End Date Taking? Authorizing Provider   oxyCODONE (ROXICODONE) 5 MG immediate release tablet Take 1 tablet by mouth every 6 hours as needed for Pain for up to 5 days. 6/1/22 6/6/22 Yes Nicol Johnson MD   metoprolol tartrate (LOPRESSOR) 25 MG tablet Take 1 tablet by mouth 2 times daily 6/1/22 7/1/22 Yes Nicol Johnson MD   mupirocin (BACTROBAN) 2 % ointment Apply topically 3 times daily. 6/1/22 6/7/22 Yes Nicol Johnson MD   collagenase 250 UNIT/GM ointment Apply topically daily.  6/1/22 6/10/22 Yes Nicol Johnson MD   polyethylene glycol (GLYCOLAX) 17 g packet Take 17 g by mouth daily as needed for Constipation 6/1/22 7/1/22 Yes Nicol Johnson MD   apixaban (ELIQUIS) 5 MG TABS tablet Take 1 tablet by mouth 2 times daily 6/1/22 7/1/22 Yes Nicol Johnson MD   Insulin Pen Needle 32G X 4 MM MISC 1 each by Does not apply route daily 6/1/22  Yes Francois Coates MD   insulin detemir (LEVEMIR FLEXTOUCH) 100 UNIT/ML injection pen Inject 45 units daily in the morning 6/1/22  Yes Karolina Alfaro MD   insulin aspart (NOVOLOG FLEXPEN) 100 UNIT/ML injection pen Inject 15 units with meals + sliding scale. -200 add 3U, -250 add 6U, -300 add 9U, -350 add 12U, -400 add 15U, BS over 400 add 18U 6/1/22  Yes Karolina Alfaro MD   sitaGLIPtan-metFORMIN (JANUMET)  MG per tablet Take 1 tablet by mouth 2 times daily (with meals) 6/1/22  Yes Karolina Alfaro MD   dapagliflozin (FARXIGA) 10 MG tablet Take 1 tablet by mouth every morning 6/1/22  Yes Karolina Alfaro MD   linezolid (ZYVOX) 600 MG tablet Take 1 tablet by mouth 2 times daily for 13 days 5/31/22 6/13/22 Yes Surjit Chisholm MD   lisinopril (PRINIVIL;ZESTRIL) 20 MG tablet TAKE 1 TABLET BY MOUTH EVERY DAY 5/23/22   Nghia Gastelum MD   escitalopram (LEXAPRO) 10 MG tablet TAKE 1 TABLET BY MOUTH EVERY DAY 5/23/22   Nghia Gastelum MD   rosuvastatin (CRESTOR) 20 MG tablet TAKE 1 TABLET BY MOUTH EVERYDAY AT BEDTIME 5/16/22   Nghia Gastelum MD   FARXIGA 10 MG tablet TAKE 1 TABLET BY MOUTH EVERY DAY IN THE MORNING 8/19/21   Nghia Gastelum MD   omega-3 acid ethyl esters (LOVAZA) 1 g capsule TAKE 2 CAPSULES BY MOUTH TWICE A DAY 12/10/20   Nghia Gastelum MD   allopurinol (ZYLOPRIM) 300 MG tablet TAKE 1 TABLET BY MOUTH EVERY DAY 3/10/20   Nghia Gastelum MD   Glucose Blood (BLOOD GLUCOSE TEST STRIPS) STRP Test four times a day 1/6/16   Nghia Gastelum MD   BD ULTRA-FINE PEN NEEDLES 29G X 12.7MM MISC USE 5 TIMES DAILY OR AS DIRECTED 9/2/14   Nghia Gastelum MD   aspirin 81 MG tablet Take 81 mg by mouth daily. Historical Provider, MD   Blood Glucose Monitoring Suppl (ONE TOUCH II TEST STRIP RESENDEZ) by Does not apply route.       Historical Provider, MD       Current Medications:    Current Facility-Administered Medications: apixaban (ELIQUIS) tablet 5 mg, 5 mg, Oral, BID  insulin lispro (HUMALOG) injection vial 15 Units, 15 Units, SubCUTAneous, TID   [START ON 6/2/2022] insulin glargine (LANTUS) injection vial 45 Units, 45 Units, SubCUTAneous, QAM  collagenase ointment, , Topical, Daily  linezolid (ZYVOX) tablet 600 mg, 600 mg, Oral, 2 times per day  metFORMIN (GLUCOPHAGE) tablet 1,000 mg, 1,000 mg, Oral, BID WC  alogliptin (NESINA) tablet 25 mg, 25 mg, Oral, Daily  perflutren lipid microspheres (DEFINITY) injection 1.65 mg, 1.5 mL, IntraVENous, ONCE PRN  metoprolol tartrate (LOPRESSOR) tablet 25 mg, 25 mg, Oral, BID  ezetimibe (ZETIA) tablet 10 mg, 10 mg, Oral, Nightly  sodium chloride flush 0.9 % injection 5-40 mL, 5-40 mL, IntraVENous, 2 times per day  sodium chloride flush 0.9 % injection 5-40 mL, 5-40 mL, IntraVENous, PRN  0.9 % sodium chloride infusion, , IntraVENous, PRN  ondansetron (ZOFRAN-ODT) disintegrating tablet 4 mg, 4 mg, Oral, Q8H PRN **OR** ondansetron (ZOFRAN) injection 4 mg, 4 mg, IntraVENous, Q6H PRN  HYDROmorphone (DILAUDID) injection 0.25 mg, 0.25 mg, IntraVENous, Q3H PRN **OR** HYDROmorphone (DILAUDID) injection 0.5 mg, 0.5 mg, IntraVENous, Q3H PRN  oxyCODONE (ROXICODONE) immediate release tablet 5 mg, 5 mg, Oral, Q4H PRN **OR** oxyCODONE (ROXICODONE) immediate release tablet 10 mg, 10 mg, Oral, Q4H PRN  escitalopram (LEXAPRO) tablet 10 mg, 10 mg, Oral, Daily  melatonin tablet 6 mg, 6 mg, Oral, Nightly  allopurinol (ZYLOPRIM) tablet 300 mg, 300 mg, Oral, Daily  aspirin EC tablet 81 mg, 81 mg, Oral, Daily  insulin lispro (HUMALOG) injection vial 0-18 Units, 0-18 Units, SubCUTAneous, TID WC  insulin lispro (HUMALOG) injection vial 0-9 Units, 0-9 Units, SubCUTAneous, Nightly  glucose chewable tablet 16 g, 4 tablet, Oral, PRN  dextrose bolus 10% 125 mL, 125 mL, IntraVENous, PRN **OR** dextrose bolus 10% 250 mL, 250 mL, IntraVENous, PRN  glucagon (rDNA) injection 1 mg, 1 mg, IntraMUSCular, PRN  dextrose 5 % solution, 100 mL/hr, IntraVENous, PRN  lisinopril (PRINIVIL;ZESTRIL) tablet 20 mg, 20 mg, Oral, Daily  rosuvastatin (CRESTOR) tablet 20 mg, 20 mg, Oral, Nightly  polyethylene glycol (GLYCOLAX) packet 17 g, 17 g, Oral, Daily PRN  acetaminophen (TYLENOL) tablet 650 mg, 650 mg, Oral, Q6H PRN **OR** acetaminophen (TYLENOL) suppository 650 mg, 650 mg, Rectal, Q6H PRN  magnesium sulfate 2000 mg in 50 mL IVPB premix, 2,000 mg, IntraVENous, PRN  potassium chloride (KLOR-CON M) extended release tablet 40 mEq, 40 mEq, Oral, PRN **OR** potassium bicarb-citric acid (EFFER-K) effervescent tablet 40 mEq, 40 mEq, Oral, PRN **OR** potassium chloride 10 mEq/100 mL IVPB (Peripheral Line), 10 mEq, IntraVENous, PRN  mupirocin (BACTROBAN) 2 % ointment, , Nasal, BID    Allergies:  Sulfa antibiotics    Social History:    Social History     Socioeconomic History    Marital status:      Spouse name: Not on file    Number of children: Not on file    Years of education: Not on file    Highest education level: Not on file   Occupational History    Not on file   Tobacco Use    Smoking status: Never Smoker    Smokeless tobacco: Never Used   Vaping Use    Vaping Use: Never used   Substance and Sexual Activity    Alcohol use: No     Comment: soc    Drug use: No    Sexual activity: Not on file   Other Topics Concern    Not on file   Social History Narrative    Not on file     Social Determinants of Health     Financial Resource Strain: Low Risk     Difficulty of Paying Living Expenses: Not hard at all   Food Insecurity: No Food Insecurity    Worried About Running Out of Food in the Last Year: Never true    Ran Out of Food in the Last Year: Never true   Transportation Needs:     Lack of Transportation (Medical): Not on file    Lack of Transportation (Non-Medical):  Not on file   Physical Activity:     Days of Exercise per Week: Not on file    Minutes of Exercise per Session: Not on file   Stress:     Feeling of Stress : Not on file   Social Connections:     Frequency of Communication with Friends and Family: Not on file    Frequency of Social Gatherings with Friends and Family: Not on file    Attends Buddhist Services: Not on file    Active Member of Clubs or Organizations: Not on file    Attends Club or Organization Meetings: Not on file    Marital Status: Not on file   Intimate Partner Violence:     Fear of Current or Ex-Partner: Not on file    Emotionally Abused: Not on file    Physically Abused: Not on file    Sexually Abused: Not on file   Housing Stability:     Unable to Pay for Housing in the Last Year: Not on file    Number of [place] in the Last Year: Not on file    Unstable Housing in the Last Year: Not on file       Family History:   Family History   Problem Relation Age of Onset    Diabetes Mother     High Blood Pressure Mother     Cancer Father         prostate    Heart Disease Father         MI @ 52yrs old/ also 1 stent       REVIEW OF SYSTEMS:     Constitutional: Denies fatigue, fevers, chills or night sweats  Eyes: Denies visual changes or drainage  ENT: Denies headaches or hearing loss. No mouth sores or sore throat. No epistaxis   Cardiovascular: Denies chest pain, pressure or palpitations. No lower extremity swelling. Respiratory: Denies GARCIA, cough, orthopnea or PND. No hemoptysis   Gastrointestinal: Denies hematemesis or anorexia. No hematochezia or melena    Genitourinary: Denies urgency, dysuria or hematuria. Musculoskeletal: Denies gait disturbance, weakness or joint complaints  Integumentary: Denies rash, hives or pruritis   Neurological: Denies dizziness, headaches or seizures. No numbness or tingling  Psychiatric: Denies anxiety or depression. Endocrine: Denies temperature intolerance. No recent weight change. .  Hematologic/Lymphatic: Denies abnormal bruising or bleeding.  No swollen lymph nodes    PHYSICAL EXAM:   BP (!) 156/80   Pulse 90   Temp 98.2 °F (36.8 °C) (Oral)   Resp 18

## 2022-06-01 NOTE — PROGRESS NOTES
ENDOCRINOLOGY PROGRESS NOTE      Date of admission: 5/27/2022  Date of service: 5/31/2022  Admitting physician: Roselia Geiger MD   Primary Care Physician: Ruchi Duron MD  Consultant physician: Kayy Bailey MD     Reason for the consultation:  Uncontrolled DM    History of Present Illness: The history is provided by the patient. Accuracy of the patient data is excellent    Ashley Serrano is a very pleasant 42 y.o. old male with PMH obesity, CHRYSTAL skin infection, poorly controlled DM type 2 and other listed below admitted to [facility] on 5/27/2022 because of skin infection in Lt upper back , endocrine service was consulted for diabetes management.     subjective   Seen and examined, no acute issues , bloog glucose improving     Inpatient diet:   Carb Restricted diet     Point of care glucose monitoring   (Independently reviewed)   Recent Labs     05/29/22  1950 05/30/22  0634 05/30/22  1141 05/30/22  1557 05/30/22  2038 05/31/22  0645 05/31/22  1128 05/31/22  1649   GLUMET 248* 213* 209* 162* 164* 266* 188* 106*     Scheduled Meds:   collagenase   Topical Daily    linezolid  600 mg Oral 2 times per day    metFORMIN  1,000 mg Oral BID WC    alogliptin  25 mg Oral Daily    insulin lispro  23 Units SubCUTAneous TID WC    metoprolol tartrate  25 mg Oral BID    ezetimibe  10 mg Oral Nightly    insulin glargine  65 Units SubCUTAneous QAM    sodium chloride flush  5-40 mL IntraVENous 2 times per day    escitalopram  10 mg Oral Daily    melatonin  6 mg Oral Nightly    allopurinol  300 mg Oral Daily    aspirin  81 mg Oral Daily    insulin lispro  0-18 Units SubCUTAneous TID WC    insulin lispro  0-9 Units SubCUTAneous Nightly    lisinopril  20 mg Oral Daily    rosuvastatin  20 mg Oral Nightly    mupirocin   Nasal BID       PRN Meds:   heparin (porcine), 4,000 Units, PRN  heparin (porcine), 2,000 Units, PRN  perflutren lipid microspheres, 1.5 mL, ONCE PRN  sodium chloride flush, 5-40 mL, PRN  sodium chloride, , PRN  ondansetron, 4 mg, Q8H PRN   Or  ondansetron, 4 mg, Q6H PRN  HYDROmorphone, 0.25 mg, Q3H PRN   Or  HYDROmorphone, 0.5 mg, Q3H PRN  oxyCODONE, 5 mg, Q4H PRN   Or  oxyCODONE, 10 mg, Q4H PRN  glucose, 4 tablet, PRN  dextrose bolus, 125 mL, PRN   Or  dextrose bolus, 250 mL, PRN  glucagon (rDNA), 1 mg, PRN  dextrose, 100 mL/hr, PRN  polyethylene glycol, 17 g, Daily PRN  acetaminophen, 650 mg, Q6H PRN   Or  acetaminophen, 650 mg, Q6H PRN  magnesium sulfate, 2,000 mg, PRN  potassium chloride, 40 mEq, PRN   Or  potassium alternative oral replacement, 40 mEq, PRN   Or  potassium chloride, 10 mEq, PRN      Continuous Infusions:   heparin (PORCINE) Infusion 18.509 Units/kg/hr (05/31/22 1742)    sodium chloride      dilTIAZem Stopped (05/31/22 0710)    sodium chloride 75 mL/hr at 05/31/22 0849    dextrose         Review of Systems  All systems reviewed.  All negative except for symptoms mentioned in HPI     OBJECTIVE    BP (!) 116/59   Pulse 94   Temp 98.5 °F (36.9 °C) (Oral)   Resp 16   Ht 5' 9\" (1.753 m)   Wt 247 lb 9.6 oz (112.3 kg)   SpO2 95%   BMI 36.56 kg/m²   No intake or output data in the 24 hours ending 05/31/22 2012    Physical examination:  General: awake alert, oriented x3  HEENT: normocephalic non traumatic, no exophthalmos   Neck: supple, No thyroid tenderness,  Pulm: good equal air entry no added sounds  CVS: S1 + S2  Abd: soft lax, no tenderness  Skin: + skin infection Lt upper back   Neuro: CN intact, sensation present bilateral , muscle power normal  Psych: normal mood, and affect    Review of Laboratory Data:  I personally reviewed the following labs:   Recent Labs     05/29/22  0955 05/30/22  0545 05/31/22  0236   WBC 18.1* 14.2* 9.8   RBC 4.74 4.28 4.40   HGB 13.9 12.3* 12.6   HCT 42.1 39.3 39.1   MCV 88.8 91.8 88.9   MCH 29.3 28.7 28.6   MCHC 33.0 31.3* 32.2   RDW 12.8 13.2 13.1    315 373   MPV 10.6 11.2 10.3     Recent Labs     05/29/22  0327 05/30/22  0545 05/31/22  0236    133 133   K 3.4* 3.6 3.6    98 96*   CO2 24 24 27   BUN 6 6 6   CREATININE 0.5* 0.5* 0.7   GLUCOSE 154* 229* 383*   CALCIUM 7.3* 8.3* 7.9*     Beta-Hydroxybutyrate   Date Value Ref Range Status   05/27/2022 4.19 (H) 0.02 - 0.27 mmol/L Final     Lab Results   Component Value Date    LABA1C 12.7 05/27/2022    LABA1C 11.4 04/19/2022    LABA1C 10.8 09/02/2020     Lab Results   Component Value Date/Time    TSH 4.470 (H) 05/30/2022 05:45 AM     Lab Results   Component Value Date    LABA1C 12.7 05/27/2022    GLUCOSE 383 05/31/2022    MALBCR <30 mg/g 04/19/2022     Lab Results   Component Value Date    TRIG 392 04/19/2022    HDL 47 04/19/2022    LDLCALC 162 04/19/2022    CHOL 287 04/19/2022       Blood culture   Lab Results   Component Value Date    BC 24 Hours no growth 05/27/2022       Radiology:  CT CHEST W CONTRAST   Final Result   1. Findings consistent with extensive cellulitis involving the subcutaneous   tissues in the left upper back. No drainable abscess seen. 2. No evidence of acute intrathoracic process. 3. 2 mm nodule in the superior aspect of the right middle lobe. If the   patient is low risk, no routine follow-up is indicated. Please see below for   Fleischner society follow-up guidelines. RECOMMENDATIONS:   Fleischner Society guidelines for follow-up and management of incidentally   detected pulmonary nodules:      Single Solid Nodule:      Nodule size less than 6 mm   In a low-risk patient, no routine follow-up. In a high-risk patient, optional CT at 12 months. Nodule size equals 6-8 mm   In a low-risk patient, CT at 6-12 months, then consider CT at 18-24 months. In a high-risk patient, CT at 6-12 months, then CT at 18-24 months. Nodule size greater than 8 mm         In a low-risk patient, consider CT at 3 months, PET/CT, or tissue sampling. In a high-risk patient, consider CT at 3 months, PET/CT, or tissue sampling.       Multiple Solid Nodules: Nodule size less than 6 mm   In a low-risk patient, no routine follow-up. In a high-risk patient, optional CT at 12 months. Nodule size equals 6-8 mm   In a low-risk patient, CT at 3-6 months, then consider CT at 18-24 months. In a high-risk patient, CT at 3-6 months, then CT at 18-24 months. Nodule size greater than 8 mm   In a low-risk patient, CT at 3-6 months, then consider CT at 18-24 months. In a high-risk patient, CT at 3-6 months, then CT at 18-24 months. - Low risk patients include individuals with minimal or absent history of   smoking and other known risk factors. - High risk patients include individuals with a history or smoking or known   risk factors. Radiology 2017 http://pubs. rsna.org/doi/full/10.1148/radiol. 2510756690             Medical Records/Labs/Images review:   I personally reviewed and summarized previous records   All labs and imaging were reviewed independently     ASSESSMENT & PLAN   Saintclair Drivers, a 42 y.o.-old male seen today for inpatient diabetes management     Diabetes Mellitus type 2   · Improving control    · We will adjust his insulin regimen to  · Lantus 50 u daily in AM   · Humalog 18 u with meals   · High dose sliding scale   · Metformin 1000 mg BID   · Will likely significantly benefit from adding GLP-1 agonist and DPP-4 inhibitors to his regimen as an outpatient   · Will titrate insulin dose based on the blood glucose trend & insulin requirement    Skin infection   · Managed by primary, ID and surgery service   · Discussion th importance of controlling DM in management of skin infection     The above issues were reviewed with the patient who understood and agreed with the plan. Thank you for allowing us to participate in the care of this patient. Please do not hesitate to contact us with any additional questions.      Josue Bolanos MD  Endocrinologist, [place] [address] [facility] Endocrinology   [address] [facility] [address]   Phone: [phone]  Fax: [phone]  --------------------------------------------  An electronic signature was used to authenticate this note.  Noelle Edgar MD on 5/31/2022 at 8:12 PM

## 2022-06-01 NOTE — PROGRESS NOTES
[address] [place] [address] [facility]  [place]  Progress Note    SUBJECTIVE:  Chief Complaint   Patient presents with    Abscess     back     Patient sitting on edge of bed, wife present  In no distress. Feeling really good and wants to go home  No fevers  Tolerating current antibiotics     Review of systems:  As stated above in the chief complaint, otherwise negative.     Medications:  Scheduled Meds:   collagenase   Topical Daily    linezolid  600 mg Oral 2 times per day    metFORMIN  1,000 mg Oral BID WC    alogliptin  25 mg Oral Daily    insulin glargine  50 Units SubCUTAneous QAM    insulin lispro  18 Units SubCUTAneous TID WC    metoprolol tartrate  25 mg Oral BID    ezetimibe  10 mg Oral Nightly    sodium chloride flush  5-40 mL IntraVENous 2 times per day    escitalopram  10 mg Oral Daily    melatonin  6 mg Oral Nightly    allopurinol  300 mg Oral Daily    aspirin  81 mg Oral Daily    insulin lispro  0-18 Units SubCUTAneous TID WC    insulin lispro  0-9 Units SubCUTAneous Nightly    lisinopril  20 mg Oral Daily    rosuvastatin  20 mg Oral Nightly    mupirocin   Nasal BID     Continuous Infusions:   heparin (PORCINE) Infusion 20.51 Units/kg/hr (06/01/22 0721)    sodium chloride      dilTIAZem Stopped (05/31/22 0710)    sodium chloride 75 mL/hr at 05/31/22 2151    dextrose       PRN Meds:heparin (porcine), heparin (porcine), perflutren lipid microspheres, sodium chloride flush, sodium chloride, ondansetron **OR** ondansetron, HYDROmorphone **OR** HYDROmorphone, oxyCODONE **OR** oxyCODONE, glucose, dextrose bolus **OR** dextrose bolus, glucagon (rDNA), dextrose, polyethylene glycol, acetaminophen **OR** acetaminophen, magnesium sulfate, potassium chloride **OR** potassium alternative oral replacement **OR** potassium chloride    OBJECTIVE:  BP (!) 157/97   Pulse 86   Temp 98.7 °F (37.1 °C) (Oral)   Resp 18   Ht 5' 9\" (1.753 m)   Wt 245 lb 12.8 oz (111.5 kg)   SpO2 99%   BMI 36.30 kg/m²   Temp  Avg: 98.8 °F (37.1 °C)  Min: 98.7 °F (37.1 °C)  Max: 98.9 °F (37.2 °C)  Constitutional: The patient is awake, alert, and oriented. In no distress  Skin: Warm and dry. No rashes were noted. L upper back wound - see picture  HEENT: Round and reactive pupils. Moist mucous membranes. No ulcerations or thrush. Neck: Supple to movements. Chest: No use of accessory muscles to breathe. Symmetrical expansion. No wheezing, crackles or rhonchi. Cardiovascular: S1 and S2 are rhythmic and regular. No murmurs appreciated. Abdomen: Positive bowel sounds to auscultation. Benign to palpation. No masses felt. Extremities: No edema.   Lines: peripheral        Laboratory and Tests Review:  Lab Results   Component Value Date    WBC 9.1 06/01/2022    WBC 9.8 05/31/2022    WBC 14.2 (H) 05/30/2022    HGB 12.6 06/01/2022    HCT 39.2 06/01/2022    MCV 89.3 06/01/2022     (H) 06/01/2022     Lab Results   Component Value Date    NEUTROABS 5.22 06/01/2022    NEUTROABS 6.28 05/31/2022    NEUTROABS 9.96 (H) 05/30/2022     No results found for: [facility]  Lab Results   Component Value Date    ALT 14 05/27/2022    AST 10 05/27/2022    ALKPHOS 154 (H) 05/27/2022    BILITOT 0.7 05/27/2022     Lab Results   Component Value Date     06/01/2022    K 4.1 06/01/2022     06/01/2022    CO2 29 06/01/2022    BUN 5 06/01/2022    CREATININE 0.6 06/01/2022    CREATININE 0.7 05/31/2022    CREATININE 0.5 05/30/2022    GFRAA >60 06/01/2022    LABGLOM >60 06/01/2022    GLUCOSE 179 06/01/2022    PROT 7.8 05/27/2022    LABALBU 3.8 05/27/2022    CALCIUM 8.4 06/01/2022    BILITOT 0.7 05/27/2022    ALKPHOS 154 05/27/2022    AST 10 05/27/2022    ALT 14 05/27/2022     Lab Results   Component Value Date    CRP 43.9 (H) 05/27/2022     Lab Results   Component Value Date    SEDRATE 51 (H) 05/27/2022     Radiology:  Reviewed     Microbiology:   Blood Cultures 5/27/22: negative so far  Surgical Cultures 5/29/22: MSSA Gram Stain: gram positive cocci in pairs, gram positive cocci in clusters, gram positive cocci    ASSESSMENT:  Recurrent furuncles and carbuncle-back  Cellulitis of the back  Historically has been MSSA  Hyperglycemia associated with the above  Leukocytosis associated with the above, improved     Plan:    Consolidate antibiotics to Zyvox - reconciled   MSSA is pan sensitive but Cephalexin may not be well absorbed in the big individual of 250 pounds. I&D of skin, soft tissue and muscle 5/29/22  Local wound care, Strong hygiene educational program given to the patient  Check surgical cultures and monitor labs  Ok to discharge from [address] [place] [address]  9:18 AM  6/1/2022   Pt seen and examined. Above discussed agree with advanced practice nurse. Labs, cultures, and radiographs reviewed. Face to Face encounter occurred. Changes made as necessary.      Akash Brown MD

## 2022-06-01 NOTE — PROGRESS NOTES
Patients wife, Madeline Mcclellan, present for wound care demonstration and teaching.  Madeline Mcclellan verbalized understanding of dressing changes and care of wound

## 2022-06-01 NOTE — PROGRESS NOTES
Removed IV's, no bleeding noted. Band aids in place x2. Notified CMR of discharge, removed heart monitor, cleaned and placed back in closet. Reviewed discharge instructions with patient, answered all questions to his satisfaction. Meds to bed delivered.  Pt d/c to home with all belongings, preferred to walk downstairs

## 2022-06-02 NOTE — PROGRESS NOTES
CLINICAL PHARMACY NOTE: MEDS TO BEDS    Total # of Prescriptions Filled: 9   The following medications were delivered to the patient:  · Mupirocin 2% oint  · zyvox 600mg  · Metformin 1000 mg  · Oxycodone 5 mg  · Metoprolol tart 25 mg  · eliquis 5 mg  · Pen needles  · basaglar kiwipen  · novolog flexpen    Additional Documentation:

## 2022-06-02 NOTE — PROGRESS NOTES
Latesha Chen, pharmacist for [facility], called to clarify sliding scale for blood sugar.

## 2022-06-08 ENCOUNTER — TELEPHONE (OUTPATIENT)
Dept: CARDIOLOGY CLINIC | Age: 47
End: 2022-06-08

## 2022-06-08 ENCOUNTER — OFFICE VISIT (OUTPATIENT)
Dept: ENDOCRINOLOGY | Age: 47
End: 2022-06-08
Payer: COMMERCIAL

## 2022-06-08 VITALS
WEIGHT: 259 LBS | HEIGHT: 70 IN | DIASTOLIC BLOOD PRESSURE: 88 MMHG | SYSTOLIC BLOOD PRESSURE: 154 MMHG | BODY MASS INDEX: 37.08 KG/M2 | HEART RATE: 71 BPM

## 2022-06-08 DIAGNOSIS — Z91.119 DIETARY NONCOMPLIANCE: ICD-10-CM

## 2022-06-08 DIAGNOSIS — E55.9 VITAMIN D DEFICIENCY: ICD-10-CM

## 2022-06-08 DIAGNOSIS — Z79.4 TYPE 2 DIABETES MELLITUS WITH HYPERGLYCEMIA, WITH LONG-TERM CURRENT USE OF INSULIN (HCC): Primary | ICD-10-CM

## 2022-06-08 DIAGNOSIS — E11.65 TYPE 2 DIABETES MELLITUS WITH HYPERGLYCEMIA, WITH LONG-TERM CURRENT USE OF INSULIN (HCC): Primary | ICD-10-CM

## 2022-06-08 PROCEDURE — 99214 OFFICE O/P EST MOD 30 MIN: CPT | Performed by: INTERNAL MEDICINE

## 2022-06-08 PROCEDURE — 1111F DSCHRG MED/CURRENT MED MERGE: CPT | Performed by: INTERNAL MEDICINE

## 2022-06-08 PROCEDURE — 1036F TOBACCO NON-USER: CPT | Performed by: INTERNAL MEDICINE

## 2022-06-08 PROCEDURE — G8417 CALC BMI ABV UP PARAM F/U: HCPCS | Performed by: INTERNAL MEDICINE

## 2022-06-08 PROCEDURE — 2022F DILAT RTA XM EVC RTNOPTHY: CPT | Performed by: INTERNAL MEDICINE

## 2022-06-08 PROCEDURE — G8428 CUR MEDS NOT DOCUMENT: HCPCS | Performed by: INTERNAL MEDICINE

## 2022-06-08 PROCEDURE — 3046F HEMOGLOBIN A1C LEVEL >9.0%: CPT | Performed by: INTERNAL MEDICINE

## 2022-06-08 RX ORDER — FLASH GLUCOSE SENSOR
KIT MISCELLANEOUS
Qty: 6 EACH | Refills: 3 | Status: SHIPPED
Start: 2022-06-08 | End: 2022-06-15 | Stop reason: SDUPTHER

## 2022-06-08 RX ORDER — FLASH GLUCOSE SENSOR
KIT MISCELLANEOUS
Qty: 6 EACH | Refills: 3 | Status: SHIPPED | OUTPATIENT
Start: 2022-06-08 | End: 2022-06-08 | Stop reason: SDUPTHER

## 2022-06-08 NOTE — PROGRESS NOTES
[address] [place] [address] Department of Endocrinology Diabetes and Metabolism   [address] [facility] [address]   Phone: [phone]  Fax: [phone]      Date of service: 6/8/2022  Primary Care Physician: Tulio De MD  Consultant physician: Nallely Zapata MD     Reason for the consultation:  Uncontrolled DM    History of Present Illness: The history is provided by the patient. Accuracy of the patient data is excellent    Milvia Crump is a very pleasant 42 y.o. old male with PMH obesity, CHRYSTAL skin infection, poorly controlled DM type 2 seen today for follow up visit after recent hospitalizatin for infected diabetic foot   The patient was diagnosed with type 2 DM long time ago. Currently on Levemir 45u daily, Huamlog 15u with meals + ss 3:50>150. Patient has had no hypoglycemic episodes. Patient has not been eating consistent carbohydrate meals and wasn't consistent with check BG. He admit poor compliance with his diet and drinking a lot of regular sodas. In addition, patient denied macrovascular or microvascular complications. Has been up to date with yearly diabetic eye exam.   Lab Results   Component Value Date    LABA1C 12.7 05/27/2022       Inpatient diet:   Carb Restricted diet     Point of care glucose monitoring   (Independently reviewed)   No results for input(s): GLUMET in the last 72 hours.     Past medical history:   Past Medical History:   Diagnosis Date    Hyperlipidemia     Hypertension     Kidney stones     Multiple times    Type II or unspecified type diabetes mellitus without mention of complication, not stated as uncontrolled        Past surgical history:  Past Surgical History:   Procedure Laterality Date    ABDOMEN SURGERY Left 5/29/2022    INCISION AND DRAINAGE LEFT BACK ABSCESS performed by Nic Montesinos MD at [address] [place] [address] leg & ankle fx    TONSILLECTOMY  2000       Social history:   Tobacco:   reports that he has never smoked. He has never used smokeless tobacco.  Alcohol:   reports no history of alcohol use. Drugs:   reports no history of drug use. Family history:    Family History   Problem Relation Age of Onset    Diabetes Mother     High Blood Pressure Mother     Cancer Father         prostate    Heart Disease Father         MI @ 52yrs old/ also 1 stent       Allergy and drug reactions: Allergies   Allergen Reactions    Sulfa Antibiotics Hives     Current Outpatient Medications   Medication Sig Dispense Refill    Continuous Blood Gluc Sensor (FREESTYLE WAGNER 2 SENSOR) MISC Change sensor every 14 days 6 each 3    metFORMIN (GLUCOPHAGE) 1000 MG tablet       metoprolol tartrate (LOPRESSOR) 25 MG tablet Take 1 tablet by mouth 2 times daily 60 tablet 0    collagenase 250 UNIT/GM ointment Apply topically daily. 90 g 0    polyethylene glycol (GLYCOLAX) 17 g packet Take 17 g by mouth daily as needed for Constipation 527 g 0    apixaban (ELIQUIS) 5 MG TABS tablet Take 1 tablet by mouth 2 times daily 60 tablet 0    Insulin Pen Needle 32G X 4 MM MISC 1 each by Does not apply route daily 100 each 0    insulin detemir (LEVEMIR FLEXTOUCH) 100 UNIT/ML injection pen Inject 45 units daily in the morning 15 pen 3    insulin aspart (NOVOLOG FLEXPEN) 100 UNIT/ML injection pen Inject 15 units with meals + sliding scale.  -200 add 3U, -250 add 6U, -300 add 9U, -350 add 12U, -400 add 15U, BS over 400 add 18U 15 pen 3    dapagliflozin (FARXIGA) 10 MG tablet Take 1 tablet by mouth every morning 90 tablet 1    linezolid (ZYVOX) 600 MG tablet Take 1 tablet by mouth 2 times daily for 13 days 26 tablet 0    lisinopril (PRINIVIL;ZESTRIL) 20 MG tablet TAKE 1 TABLET BY MOUTH EVERY DAY 90 tablet 3    escitalopram (LEXAPRO) 10 MG tablet TAKE 1 TABLET BY MOUTH EVERY DAY 90 tablet 1    rosuvastatin (CRESTOR) 20 MG tablet TAKE 1 TABLET BY MOUTH EVERYDAY AT BEDTIME 30 tablet 5    FARXIGA 10 MG tablet TAKE 1 TABLET BY MOUTH EVERY DAY IN THE MORNING 90 tablet 3    omega-3 acid ethyl esters (LOVAZA) 1 g capsule TAKE 2 CAPSULES BY MOUTH TWICE A  capsule 3    allopurinol (ZYLOPRIM) 300 MG tablet TAKE 1 TABLET BY MOUTH EVERY DAY 90 tablet 3    Glucose Blood (BLOOD GLUCOSE TEST STRIPS) STRP Test four times a day 360 strip 3    BD ULTRA-FINE PEN NEEDLES 29G X 12.7MM MISC USE 5 TIMES DAILY OR AS DIRECTED 500 each 3    aspirin 81 MG tablet Take 81 mg by mouth daily.  Blood Glucose Monitoring Suppl (ONE TOUCH II TEST STRIP RESENDEZ) by Does not apply route. No current facility-administered medications for this visit. Review of Systems  All systems reviewed. All negative except for symptoms mentioned in HPI     OBJECTIVE    BP (!) 154/88   Pulse 71   Ht 5' 10\" (1.778 m)   Wt 259 lb (117.5 kg)   BMI 37.16 kg/m²   No intake or output data in the 24 hours ending 06/08/22 1455    Physical examination:  General: awake alert, oriented x3  HEENT: normocephalic non traumatic, no exophthalmos   Neck: supple, No thyroid tenderness,  Pulm: good equal air entry no added sounds  CVS: S1 + S2  Abd: soft lax, no tenderness  Skin: + skin infection Lt upper back   Neuro: CN intact, sensation present bilateral , muscle power normal  Psych: normal mood, and affect    Review of Laboratory Data:  I personally reviewed the following labs:   No results for input(s): WBC, RBC, HGB, HCT, MCV, MCH, MCHC, RDW, PLT, MPV in the last 72 hours. No results for input(s): NA, K, CL, CO2, BUN, CREATININE, GLUCOSE, CALCIUM, PROT, LABALBU, BILITOT, ALKPHOS, AST, ALT in the last 72 hours.   Beta-Hydroxybutyrate   Date Value Ref Range Status   05/27/2022 4.19 (H) 0.02 - 0.27 mmol/L Final     Lab Results   Component Value Date    LABA1C 12.7 05/27/2022    LABA1C 11.4 04/19/2022    LABA1C 10.8 09/02/2020     Lab Results   Component Value Date/Time    TSH 4.470 (H) 05/30/2022 05:45 AM     Lab Results   Component Value Date    LABA1C 12.7 05/27/2022    GLUCOSE 179 06/01/2022    MALBCR <30 mg/g 04/19/2022     Lab Results   Component Value Date    TRIG 392 04/19/2022    HDL 47 04/19/2022    LDLCALC 162 04/19/2022    CHOL 287 04/19/2022       Blood culture   Lab Results   Component Value Date    BC 5 Days no growth 05/27/2022       Radiology:  No orders to display     ASSESSMENT & PLAN   Ishmael Pinto, a 42 y.o.-old male seen today for inpatient diabetes management     Diabetes Mellitus type 2   · Improving control  · Continue Levemir 45u daily, Humalog 15u with meals + ss 3:50>150   · Continue glucose check with meals and at bedtime   · Will titrate insulin dose based on the blood glucose trend & insulin requirement    Skin infection   · Managed by primary, ID and surgery service   · Discussion th importance of controlling DM in management of skin infection     The above issues were reviewed with the patient who understood and agreed with the plan. Thank you for allowing us to participate in the care of this patient. Please do not hesitate to contact us with any additional questions. Sabas Manzanares MD  Endocrinologist, [facility] [[email or web address]] Diabetes Care and Endocrinology   [address] [place], [place] [address]   Phone: [phone]  Fax: [phone]  --------------------------------------------  An electronic signature was used to authenticate this note.  Yokasta Busby MD on 6/8/2022 at 2:55 PM

## 2022-06-10 PROBLEM — Z91.119 DIETARY NONCOMPLIANCE: Status: ACTIVE | Noted: 2022-06-10

## 2022-06-10 PROBLEM — E55.9 VITAMIN D DEFICIENCY: Status: ACTIVE | Noted: 2022-06-10

## 2022-06-13 RX ORDER — ALLOPURINOL 300 MG/1
TABLET ORAL
Qty: 90 TABLET | Refills: 3 | Status: SHIPPED | OUTPATIENT
Start: 2022-06-13

## 2022-06-14 ENCOUNTER — HOSPITAL ENCOUNTER (OUTPATIENT)
Dept: WOUND CARE | Age: 47
Discharge: HOME | End: 2022-06-14
Payer: COMMERCIAL

## 2022-06-14 VITALS
WEIGHT: 252 LBS | SYSTOLIC BLOOD PRESSURE: 142 MMHG | DIASTOLIC BLOOD PRESSURE: 70 MMHG | HEART RATE: 18 BPM | HEIGHT: 70 IN | BODY MASS INDEX: 36.08 KG/M2 | TEMPERATURE: 96.4 F | RESPIRATION RATE: 18 BRPM

## 2022-06-14 PROCEDURE — 11043 DBRDMT MUSC&/FSCA 1ST 20/<: CPT

## 2022-06-14 PROCEDURE — 11046 DBRDMT MUSC&/FSCA EA ADDL: CPT

## 2022-06-14 PROCEDURE — 99213 OFFICE O/P EST LOW 20 MIN: CPT

## 2022-06-14 PROCEDURE — 6370000000 HC RX 637 (ALT 250 FOR IP): Performed by: SURGERY

## 2022-06-14 PROCEDURE — 11043 DBRDMT MUSC&/FSCA 1ST 20/<: CPT | Performed by: SURGERY

## 2022-06-14 PROCEDURE — 11046 DBRDMT MUSC&/FSCA EA ADDL: CPT | Performed by: SURGERY

## 2022-06-14 PROCEDURE — 99024 POSTOP FOLLOW-UP VISIT: CPT | Performed by: SURGERY

## 2022-06-14 RX ORDER — LIDOCAINE 50 MG/G
OINTMENT TOPICAL ONCE
Status: CANCELLED | OUTPATIENT
Start: 2022-06-14 | End: 2022-06-14

## 2022-06-14 RX ORDER — LIDOCAINE HYDROCHLORIDE 40 MG/ML
SOLUTION TOPICAL ONCE
Status: CANCELLED | OUTPATIENT
Start: 2022-06-14 | End: 2022-06-14

## 2022-06-14 RX ORDER — LIDOCAINE HYDROCHLORIDE 20 MG/ML
JELLY TOPICAL ONCE
Status: CANCELLED | OUTPATIENT
Start: 2022-06-14 | End: 2022-06-14

## 2022-06-14 RX ORDER — LIDOCAINE HYDROCHLORIDE 40 MG/ML
SOLUTION TOPICAL ONCE
Status: COMPLETED | OUTPATIENT
Start: 2022-06-14 | End: 2022-06-14

## 2022-06-14 RX ORDER — BETAMETHASONE DIPROPIONATE 0.05 %
OINTMENT (GRAM) TOPICAL ONCE
Status: CANCELLED | OUTPATIENT
Start: 2022-06-14 | End: 2022-06-14

## 2022-06-14 RX ORDER — BACITRACIN, NEOMYCIN, POLYMYXIN B 400; 3.5; 5 [USP'U]/G; MG/G; [USP'U]/G
OINTMENT TOPICAL ONCE
Status: CANCELLED | OUTPATIENT
Start: 2022-06-14 | End: 2022-06-14

## 2022-06-14 RX ORDER — LIDOCAINE 40 MG/G
CREAM TOPICAL ONCE
Status: CANCELLED | OUTPATIENT
Start: 2022-06-14 | End: 2022-06-14

## 2022-06-14 RX ORDER — BACITRACIN ZINC AND POLYMYXIN B SULFATE 500; 1000 [USP'U]/G; [USP'U]/G
OINTMENT TOPICAL ONCE
Status: CANCELLED | OUTPATIENT
Start: 2022-06-14 | End: 2022-06-14

## 2022-06-14 RX ORDER — CLOBETASOL PROPIONATE 0.5 MG/G
OINTMENT TOPICAL ONCE
Status: CANCELLED | OUTPATIENT
Start: 2022-06-14 | End: 2022-06-14

## 2022-06-14 RX ORDER — GENTAMICIN SULFATE 1 MG/G
OINTMENT TOPICAL ONCE
Status: CANCELLED | OUTPATIENT
Start: 2022-06-14 | End: 2022-06-14

## 2022-06-14 RX ORDER — GINSENG 100 MG
CAPSULE ORAL ONCE
Status: CANCELLED | OUTPATIENT
Start: 2022-06-14 | End: 2022-06-14

## 2022-06-14 RX ADMIN — LIDOCAINE HYDROCHLORIDE 5 ML: 40 SOLUTION TOPICAL at 14:05

## 2022-06-14 NOTE — PLAN OF CARE
Problem: Chronic Conditions and Co-morbidities  Goal: Patient's chronic conditions and co-morbidity symptoms are monitored and maintained or improved  Outcome: Progressing     Problem: Cognitive:  Goal: Knowledge of wound care  Description: Knowledge of wound care  Outcome: Progressing  Goal: Understands risk factors for wounds  Description: Understands risk factors for wounds  Outcome: Progressing     Problem: Wound:  Goal: Will show signs of wound healing; wound closure and no evidence of infection  Description: Will show signs of wound healing; wound closure and no evidence of infection  Outcome: Progressing

## 2022-06-14 NOTE — PROGRESS NOTES
[facility]aling Center Followup Visit Note    Referring Physician : Tulio De MD  Milvia Crump  MEDICAL RECORD NUMBER:  [id number]  AGE: 42 y.o. GENDER: male  : 1975  EPISODE DATE:  2022    Subjective:     Chief Complaint   Patient presents with    Wound Check     back      HISTORY of PRESENT ILLNESS HPI   Milvia Crump is a 42 y.o. male who presents today in regards to follow up evaluation and treatment of wound/ulcer. That patient's past medical, family and social hx were reviewed and changes were made if present. History of Wound Context:  The patient has a wound of the left shoulder which underwent operative debridement for a necrotizing soft tissue infection. It has been improving with santyl.     Wound/Ulcer Pain Timing/Severity: intermittent  Quality of pain: burning  Severity:  4 / 10   Modifying Factors: Pain is relieved/improved with rest  Associated Signs/Symptoms: pain    Ulcer Identification:  Ulcer Type: non-healing surgical  Contributing Factors: poor glucose control    Diabetic/Pressure/Non Pressure Ulcers only:  Ulcer: Non-Pressure ulcer, muscle necrosis    Wound: N/A        PAST MEDICAL HISTORY      Diagnosis Date    Hyperlipidemia     Hypertension     Kidney stones     Multiple times    Type II or unspecified type diabetes mellitus without mention of complication, not stated as uncontrolled      Past Surgical History:   Procedure Laterality Date    ABDOMEN SURGERY Left 2022    INCISION AND DRAINAGE LEFT BACK ABSCESS performed by Nic Montesinos MD at [facility] leg & ankle fx    TONSILLECTOMY       Family History   Problem Relation Age of Onset    Diabetes Mother     High Blood Pressure Mother     Cancer Father         prostate    Heart Disease Father         MI @ 52yrs old/ also 1 stent     Social History     Tobacco Use    Smoking status: Never Smoker    Smokeless tobacco: Never Used   Vaping Use  Vaping Use: Never used   Substance Use Topics    Alcohol use: No     Comment: soc    Drug use: No     Allergies   Allergen Reactions    Sulfa Antibiotics Hives     Current Outpatient Medications on File Prior to Encounter   Medication Sig Dispense Refill    allopurinol (ZYLOPRIM) 300 MG tablet TAKE 1 TABLET BY MOUTH EVERY DAY 90 tablet 3    metFORMIN (GLUCOPHAGE) 1000 MG tablet 1,000 mg 2 times daily (with meals)       Continuous Blood Gluc Sensor (FREESTYLE WAGNER 2 SENSOR) MISC Change sensor every 14 days 6 each 3    metoprolol tartrate (LOPRESSOR) 25 MG tablet Take 1 tablet by mouth 2 times daily 60 tablet 0    apixaban (ELIQUIS) 5 MG TABS tablet Take 1 tablet by mouth 2 times daily 60 tablet 0    Insulin Pen Needle 32G X 4 MM MISC 1 each by Does not apply route daily 100 each 0    insulin detemir (LEVEMIR FLEXTOUCH) 100 UNIT/ML injection pen Inject 45 units daily in the morning 15 pen 3    insulin aspart (NOVOLOG FLEXPEN) 100 UNIT/ML injection pen Inject 15 units with meals + sliding scale. -200 add 3U, -250 add 6U, -300 add 9U, -350 add 12U, -400 add 15U, BS over 400 add 18U 15 pen 3    lisinopril (PRINIVIL;ZESTRIL) 20 MG tablet TAKE 1 TABLET BY MOUTH EVERY DAY 90 tablet 3    escitalopram (LEXAPRO) 10 MG tablet TAKE 1 TABLET BY MOUTH EVERY DAY 90 tablet 1    rosuvastatin (CRESTOR) 20 MG tablet TAKE 1 TABLET BY MOUTH EVERYDAY AT BEDTIME 30 tablet 5    FARXIGA 10 MG tablet TAKE 1 TABLET BY MOUTH EVERY DAY IN THE MORNING 90 tablet 3    omega-3 acid ethyl esters (LOVAZA) 1 g capsule TAKE 2 CAPSULES BY MOUTH TWICE A  capsule 3    Glucose Blood (BLOOD GLUCOSE TEST STRIPS) STRP Test four times a day 360 strip 3    BD ULTRA-FINE PEN NEEDLES 29G X 12.7MM MISC USE 5 TIMES DAILY OR AS DIRECTED 500 each 3    aspirin 81 MG tablet Take 81 mg by mouth daily.  (Patient not taking: Reported on 6/14/2022)      Blood Glucose Monitoring Suppl (ONE TOUCH II TEST STRIP RESENDEZ) by Does not apply route. No current facility-administered medications on file prior to encounter. REVIEW OF SYSTEMS See HPI    Objective:    BP (!) 142/70   Pulse (!) 18   Temp (!) 96.4 °F (35.8 °C) (Temporal)   Resp 18   Ht 5' 10\" (1.778 m)   Wt 252 lb (114.3 kg)   BMI 36.16 kg/m²   Wt Readings from Last 3 Encounters:   06/14/22 252 lb (114.3 kg)   06/08/22 259 lb (117.5 kg)   06/01/22 245 lb 12.8 oz (111.5 kg)     PHYSICAL EXAM  CONSTITUTIONAL:   Awake, alert, cooperative   EYES:  lids and lashes normal   ENT: external ears and nose without lesions   NECK:  supple, symmetrical, trachea midline   SKIN:  Open wound Present    Assessment:     Problem List Items Addressed This Visit     None          Pre Debridement Measurements:  Are located in the [place]  Documentation Flow Sheet  Post Debridement Measurements:  Wound/Ulcer Descriptions are Pre Debridement except measurements:     Wound 05/31/22 Shoulder Posterior; Left #1 Wound LEFT SHOULDER  (Active)   Wound Image   06/14/22 1404   Dressing Status Old drainage noted 06/01/22 0735   Wound Cleansed Cleansed with saline 05/31/22 2200   Dressing/Treatment Other (comment) 05/31/22 2200   Dressing Change Due 06/01/22 05/31/22 2200   Wound Length (cm) 4.5 cm 06/14/22 1402   Wound Width (cm) 5.4 cm 06/14/22 1402   Wound Depth (cm) 1.7 cm 06/14/22 1402   Wound Surface Area (cm^2) 24.3 cm^2 06/14/22 1402   Change in Wound Size % (l*w) -49.54 06/14/22 1402   Wound Volume (cm^3) 41.31 cm^3 06/14/22 1402   Wound Healing % 15 06/14/22 1402   Post-Procedure Length (cm) 4.7 cm 06/14/22 1404   Post-Procedure Width (cm) 5.5 cm 06/14/22 1404   Post-Procedure Depth (cm) 1.9 cm 06/14/22 1404   Post-Procedure Surface Area (cm^2) 25.85 cm^2 06/14/22 1404   Post-Procedure Volume (cm^3) 49.115 cm^3 06/14/22 1404   Undermining Starts ___ O'Clock 2 05/31/22 1052   Undermining Ends___ O'Clock 5 05/31/22 1052   Undermining Maxium Distance (cm) 1.5 05/31/22 1052 Wound Assessment Fibrin;Granulation tissue 06/14/22 1402   Drainage Amount Moderate 06/14/22 1402   Drainage Description Thick; Yellow 06/14/22 1402   Odor None 06/14/22 1402   Soco-wound Assessment Warm;Blanchable erythema 06/14/22 1402   Wound Thickness Description not for Pressure Injury Full thickness 05/31/22 1052   Number of days: 13          Procedure Note  Indications:  Based on my examination of this patient's wound(s)/ulcer(s) today, debridement is required to promote healing and evaluate the wound base. Performed by: Delroy Moreno MD    Consent obtained:  Yes    Time out taken:  Yes    Pain Control: Anesthetic  Anesthetic: 4% Lidocaine Liquid Topical     Debridement:Excisional Debridement    Using curette ans scissors the wound(s)/ulcer(s) was/were sharply debrided down through and including the removal of muscle/fascia. Devitalized Tissue Debrided:  fibrin, biofilm, slough, exudate and callus to stimulate bleeding to promote healing, post debridement good bleeding base and wound edges noted    Wound/Ulcer #: 1    Percent of Wound/Ulcer Debrided: 100%    Total Surface Area Debrided:  25.85 sq cm     Estimated Blood Loss:  Minimal  Hemostasis Achieved:  by pressure    Procedural Pain:  9  / 10   Post Procedural Pain:  4 / 10     Response to treatment:  Well tolerated by patient. Plan:   Treatment Note please see attached Discharge Instructions    Written patient dismissal instructions given to patient and signed by patient or POA. Discharge Instructions       Visit Discharge/Physician Orders    Discharge condition: Stable    Assessment of pain at discharge:minimal    Anesthetic used: 4% lidocaine    Discharge to: Home    Left via:Private automobile    Accompanied by: accompanied by       ECF/HHA: [facility]    Dressing Orders:left upper back cleanse with normal saline apply santyl and dry dressing daily.     Treatment Orders:  Eat foods high in protein and vitamin c    Multivitamin daily    [facility] followup visit _____________1 week________________  (Please note your next appointment above and if you are unable to keep, kindly give a 24 hour notice. Thank you.)    Physician signature:__________________________      If you experience any of the following, please call the [address] [place] [address] [facility] hours:    * Increase in Pain  * Temperature over 101  * Increase in drainage from your wound  * Drainage with a foul odor  * Bleeding  * Increase in swelling  * Need for compression bandage changes due to slippage, breakthrough drainage. If you need medical attention outside of the business hours of the [address] [place] [address] [address] please contact your PCP or go to the nearest emergency room.         Electronically signed by Lucian Hunt MD on 6/14/2022 at 2:55 PM

## 2022-06-15 DIAGNOSIS — E11.65 TYPE 2 DIABETES MELLITUS WITH HYPERGLYCEMIA, WITH LONG-TERM CURRENT USE OF INSULIN (HCC): ICD-10-CM

## 2022-06-15 DIAGNOSIS — Z79.4 TYPE 2 DIABETES MELLITUS WITH HYPERGLYCEMIA, WITH LONG-TERM CURRENT USE OF INSULIN (HCC): ICD-10-CM

## 2022-06-15 RX ORDER — FLASH GLUCOSE SENSOR
KIT MISCELLANEOUS
Qty: 6 EACH | Refills: 3 | Status: SHIPPED | OUTPATIENT
Start: 2022-06-15

## 2022-06-15 NOTE — PROGRESS NOTES
Physician Progress Note      Diamond Haro  CSN #:                  [id number]  :                       1975  ADMIT DATE:       2022 1:10 PM  [address] DATE:        2022 5:20 PM  RESPONDING  PROVIDER #:        Jose R Nye MD          QUERY TEXT:    Patient admitted with Soft tissue infection. Noted documentation of DKA by ER   provider. If possible, please document in progress notes and discharge   summary:    The medical record reflects the following:  Risk Factors: Diabetes  Clinical Indicators: Per ED provider \". Franklin Thomas Diabetic ketoacidosis without coma   associated with type 2 diabetes mellitus. Franklin William Franklin Thomas \" Beta-hydroxybutyrate of 4.19,   meter glucose on  of   Treatment: SC Insulin. Options provided:  -- Diabetic ketoacidosis confirmed present on admission  -- Diabetic ketoacidosis ruled out  -- Other - I will add my own diagnosis  -- Disagree - Not applicable / Not valid  -- Disagree - Clinically unable to determine / Unknown  -- Refer to Clinical Documentation Reviewer    PROVIDER RESPONSE TEXT:    The diagnosis of Diabetic ketoacidosis was confirmed as present on admission.     Query created by: Umberto Meade on 2022 2:57 PM      Electronically signed by:  Jose R Nye MD 2022 10:07 PM

## 2022-06-21 ENCOUNTER — HOSPITAL ENCOUNTER (OUTPATIENT)
Dept: WOUND CARE | Age: 47
Discharge: HOME | End: 2022-06-21
Payer: COMMERCIAL

## 2022-06-21 VITALS
HEART RATE: 100 BPM | RESPIRATION RATE: 18 BRPM | TEMPERATURE: 98.9 F | SYSTOLIC BLOOD PRESSURE: 150 MMHG | DIASTOLIC BLOOD PRESSURE: 88 MMHG

## 2022-06-21 DIAGNOSIS — M79.89 NECROTIZING SOFT TISSUE INFECTION: ICD-10-CM

## 2022-06-21 PROCEDURE — 11042 DBRDMT SUBQ TIS 1ST 20SQCM/<: CPT | Performed by: SURGERY

## 2022-06-21 PROCEDURE — 11042 DBRDMT SUBQ TIS 1ST 20SQCM/<: CPT

## 2022-06-21 NOTE — PROGRESS NOTES
Wound Healing Center Followup Visit Note    Referring Physician : Francine Graham MD  Anastasia Martinez  MEDICAL RECORD NUMBER:  [id number]  AGE: 42 y.o. GENDER: male  : 1975  EPISODE DATE:  2022    Subjective:     Chief Complaint   Patient presents with    Wound Check     shoulder left      HISTORY of PRESENT ILLNESS HPI   Anastasia Martinez is a 42 y.o. male who presents today in regards to follow up evaluation and treatment of wound/ulcer. That patient's past medical, family and social hx were reviewed and changes were made if present. History of Wound Context:  The patient has a wound of the left shoulder which underwent operative debridement for a necrotizing soft tissue infection. It has been improving with santyl.     Wound/Ulcer Pain Timing/Severity: intermittent  Quality of pain: burning  Severity:  4 / 10   Modifying Factors: Pain is relieved/improved with rest  Associated Signs/Symptoms: pain    Ulcer Identification:  Ulcer Type: non-healing surgical  Contributing Factors: poor glucose control    Diabetic/Pressure/Non Pressure Ulcers only:  Ulcer: Non-Pressure ulcer, muscle necrosis    Wound: N/A        PAST MEDICAL HISTORY      Diagnosis Date    Hyperlipidemia     Hypertension     Kidney stones     Multiple times    Type II or unspecified type diabetes mellitus without mention of complication, not stated as uncontrolled      Past Surgical History:   Procedure Laterality Date    ABDOMEN SURGERY Left 2022    INCISION AND DRAINAGE LEFT BACK ABSCESS performed by Jelena Butler MD at [address] [place] [address] leg & ankle fx    TONSILLECTOMY       Family History   Problem Relation Age of Onset    Diabetes Mother     High Blood Pressure Mother     Cancer Father         prostate    Heart Disease Father         MI @ 52yrs old/ also 1 stent     Social History     Tobacco Use    Smoking status: Never Smoker    Smokeless tobacco: Never Used Vaping Use    Vaping Use: Never used   Substance Use Topics    Alcohol use: No     Comment: soc    Drug use: No     Allergies   Allergen Reactions    Sulfa Antibiotics Hives     Current Outpatient Medications on File Prior to Encounter   Medication Sig Dispense Refill    Continuous Blood Gluc Sensor (FREESTYLE WAGNER 2 SENSOR) MISC Change sensor every 14 days 6 each 3    allopurinol (ZYLOPRIM) 300 MG tablet TAKE 1 TABLET BY MOUTH EVERY DAY 90 tablet 3    metFORMIN (GLUCOPHAGE) 1000 MG tablet 1,000 mg 2 times daily (with meals)       metoprolol tartrate (LOPRESSOR) 25 MG tablet Take 1 tablet by mouth 2 times daily 60 tablet 0    apixaban (ELIQUIS) 5 MG TABS tablet Take 1 tablet by mouth 2 times daily 60 tablet 0    Insulin Pen Needle 32G X 4 MM MISC 1 each by Does not apply route daily 100 each 0    insulin detemir (LEVEMIR FLEXTOUCH) 100 UNIT/ML injection pen Inject 45 units daily in the morning 15 pen 3    insulin aspart (NOVOLOG FLEXPEN) 100 UNIT/ML injection pen Inject 15 units with meals + sliding scale. -200 add 3U, -250 add 6U, -300 add 9U, -350 add 12U, -400 add 15U, BS over 400 add 18U 15 pen 3    lisinopril (PRINIVIL;ZESTRIL) 20 MG tablet TAKE 1 TABLET BY MOUTH EVERY DAY 90 tablet 3    escitalopram (LEXAPRO) 10 MG tablet TAKE 1 TABLET BY MOUTH EVERY DAY 90 tablet 1    rosuvastatin (CRESTOR) 20 MG tablet TAKE 1 TABLET BY MOUTH EVERYDAY AT BEDTIME 30 tablet 5    FARXIGA 10 MG tablet TAKE 1 TABLET BY MOUTH EVERY DAY IN THE MORNING 90 tablet 3    omega-3 acid ethyl esters (LOVAZA) 1 g capsule TAKE 2 CAPSULES BY MOUTH TWICE A  capsule 3    Glucose Blood (BLOOD GLUCOSE TEST STRIPS) STRP Test four times a day 360 strip 3    BD ULTRA-FINE PEN NEEDLES 29G X 12.7MM MISC USE 5 TIMES DAILY OR AS DIRECTED 500 each 3    aspirin 81 MG tablet Take 81 mg by mouth daily       Blood Glucose Monitoring Suppl (ONE TOUCH II TEST STRIP RESENDEZ) by Does not apply route. No current facility-administered medications on file prior to encounter. REVIEW OF SYSTEMS See HPI    Objective:    BP (!) 150/88   Pulse 100   Temp 98.9 °F (37.2 °C) (Tympanic)   Resp 18   Wt Readings from Last 3 Encounters:   06/14/22 252 lb (114.3 kg)   06/08/22 259 lb (117.5 kg)   06/01/22 245 lb 12.8 oz (111.5 kg)     PHYSICAL EXAM  CONSTITUTIONAL:   Awake, alert, cooperative   EYES:  lids and lashes normal   ENT: external ears and nose without lesions   NECK:  supple, symmetrical, trachea midline   SKIN:  Open wound Present    Assessment:     Problem List Items Addressed This Visit     * (Principal) Necrotizing soft tissue infection          Pre Debridement Measurements:  Are located in the [place]  Documentation Flow Sheet  Post Debridement Measurements:  Wound/Ulcer Descriptions are Pre Debridement except measurements:     Wound 05/31/22 Shoulder Posterior; Left #1 Wound LEFT SHOULDER  (Active)   Wound Image   06/14/22 1404   Dressing Status Old drainage noted 06/01/22 0735   Wound Cleansed Cleansed with saline 05/31/22 2200   Dressing/Treatment Other (comment) 05/31/22 2200   Dressing Change Due 06/01/22 05/31/22 2200   Wound Length (cm) 4 cm 06/21/22 1400   Wound Width (cm) 4.5 cm 06/21/22 1400   Wound Depth (cm) 1.2 cm 06/21/22 1400   Wound Surface Area (cm^2) 18 cm^2 06/21/22 1400   Change in Wound Size % (l*w) -10.77 06/21/22 1400   Wound Volume (cm^3) 21.6 cm^3 06/21/22 1400   Wound Healing % 56 06/21/22 1400   Post-Procedure Length (cm) 4.2 cm 06/21/22 1425   Post-Procedure Width (cm) 4.6 cm 06/21/22 1425   Post-Procedure Depth (cm) 1.3 cm 06/21/22 1425   Post-Procedure Surface Area (cm^2) 19.32 cm^2 06/21/22 1425   Post-Procedure Volume (cm^3) 25.116 cm^3 06/21/22 1425   Undermining Starts ___ O'Clock 2 05/31/22 1052   Undermining Ends___ O'Clock 5 05/31/22 1052   Undermining Maxium Distance (cm) 1.5 05/31/22 1052   Wound Assessment Fibrin;Granulation tissue 06/21/22 1400   Drainage Amount Moderate 06/21/22 1400   Drainage Description Serosanguinous 06/21/22 1400   Odor None 06/21/22 1400   Soco-wound Assessment Intact 06/21/22 1400   Wound Thickness Description not for Pressure Injury Full thickness 06/21/22 1400   Number of days: 20          Procedure Note  Indications:  Based on my examination of this patient's wound(s)/ulcer(s) today, debridement is required to promote healing and evaluate the wound base. Performed by: James Briggs MD    Consent obtained:  Yes    Time out taken:  Yes    Pain Control: Anesthetic  Anesthetic: 4% Lidocaine Liquid Topical     Debridement:Excisional Debridement    Using curette ans scissors the wound(s)/ulcer(s) was/were sharply debrided down through and including the removal of subcutaneous tissue. Devitalized Tissue Debrided:  fibrin, biofilm, slough, exudate and callus to stimulate bleeding to promote healing, post debridement good bleeding base and wound edges noted    Wound/Ulcer #: 1    Percent of Wound/Ulcer Debrided: 100%    Total Surface Area Debrided:  19.32 sq cm     Estimated Blood Loss:  Minimal  Hemostasis Achieved:  by pressure    Procedural Pain:  9  / 10   Post Procedural Pain:  4 / 10     Response to treatment:  Well tolerated by patient. Plan:   Treatment Note please see attached Discharge Instructions    Written patient dismissal instructions given to patient and signed by patient or POA.          Discharge Instructions       Visit Discharge/Physician Orders     Discharge condition: Stable     Assessment of pain at discharge:minimal     Anesthetic used: 4% lidocaine     Discharge to: Home     Left via:Private automobile     Accompanied by: accompanied by         ECF/HHA: [facility]     Dressing Orders:left upper back cleanse with normal saline apply santyl and dry dressing daily.     Treatment Orders:  Eat foods high in protein and vitamin c     Multivitamin daily     [facility] followup visit _____________1 week________________  (Please note your next appointment above and if you are unable to keep, kindly give a 24 hour notice.  Thank you.)     Physician signature:__________________________        If you experience any of the following, please call the [facility] [address] during business hours:     * Increase in Pain  * Temperature over 101  * Increase in drainage from your wound  * Drainage with a foul odor  * Bleeding  * Increase in swelling  * Need for compression bandage changes due to slippage, breakthrough drainage.     If you need medical attention outside of the business hours of the [facility] [address] please contact your PCP or go to the nearest emergency room.                 Electronically signed by Neftali Rod MD on 6/21/2022 at 2:26 PM

## 2022-06-23 ENCOUNTER — TELEPHONE (OUTPATIENT)
Dept: ENDOCRINOLOGY | Age: 47
End: 2022-06-23

## 2022-06-24 NOTE — TELEPHONE ENCOUNTER
Pa denied. Pt must try dexcom first. Called Patient and LM  that his insurance will pay for dexcom instead.

## 2022-06-30 ENCOUNTER — TELEPHONE (OUTPATIENT)
Dept: ADMINISTRATIVE | Age: 47
End: 2022-06-30

## 2022-06-30 NOTE — TELEPHONE ENCOUNTER
Pt called to schedule hfu with Dr. Jaret Funez. Pt was admitted to [facility] for new onset a fib and dcd  On 5/30/22. Pt was advised to f/u with Dr. Edwin Chopra.

## 2022-07-04 LAB
FUNGUS (MYCOLOGY) CULTURE: NORMAL
FUNGUS STAIN: NORMAL

## 2022-07-05 ENCOUNTER — HOSPITAL ENCOUNTER (OUTPATIENT)
Dept: WOUND CARE | Age: 47
Discharge: HOME | End: 2022-07-05
Payer: COMMERCIAL

## 2022-07-05 VITALS
DIASTOLIC BLOOD PRESSURE: 84 MMHG | RESPIRATION RATE: 18 BRPM | BODY MASS INDEX: 36.08 KG/M2 | SYSTOLIC BLOOD PRESSURE: 152 MMHG | HEIGHT: 70 IN | WEIGHT: 252 LBS | TEMPERATURE: 99.8 F | HEART RATE: 96 BPM

## 2022-07-05 DIAGNOSIS — M79.89 NECROTIZING SOFT TISSUE INFECTION: Primary | ICD-10-CM

## 2022-07-05 PROCEDURE — 11042 DBRDMT SUBQ TIS 1ST 20SQCM/<: CPT

## 2022-07-05 PROCEDURE — 6370000000 HC RX 637 (ALT 250 FOR IP): Performed by: SURGERY

## 2022-07-05 PROCEDURE — 11042 DBRDMT SUBQ TIS 1ST 20SQCM/<: CPT | Performed by: SURGERY

## 2022-07-05 RX ORDER — LIDOCAINE HYDROCHLORIDE 40 MG/ML
SOLUTION TOPICAL ONCE
Status: CANCELLED | OUTPATIENT
Start: 2022-07-05 | End: 2022-07-05

## 2022-07-05 RX ORDER — BACITRACIN, NEOMYCIN, POLYMYXIN B 400; 3.5; 5 [USP'U]/G; MG/G; [USP'U]/G
OINTMENT TOPICAL ONCE
Status: CANCELLED | OUTPATIENT
Start: 2022-07-05 | End: 2022-07-05

## 2022-07-05 RX ORDER — LIDOCAINE 50 MG/G
OINTMENT TOPICAL ONCE
Status: CANCELLED | OUTPATIENT
Start: 2022-07-05 | End: 2022-07-05

## 2022-07-05 RX ORDER — GENTAMICIN SULFATE 1 MG/G
OINTMENT TOPICAL ONCE
Status: CANCELLED | OUTPATIENT
Start: 2022-07-05 | End: 2022-07-05

## 2022-07-05 RX ORDER — LIDOCAINE HYDROCHLORIDE 20 MG/ML
JELLY TOPICAL ONCE
Status: CANCELLED | OUTPATIENT
Start: 2022-07-05 | End: 2022-07-05

## 2022-07-05 RX ORDER — GINSENG 100 MG
CAPSULE ORAL ONCE
Status: CANCELLED | OUTPATIENT
Start: 2022-07-05 | End: 2022-07-05

## 2022-07-05 RX ORDER — BETAMETHASONE DIPROPIONATE 0.05 %
OINTMENT (GRAM) TOPICAL ONCE
Status: CANCELLED | OUTPATIENT
Start: 2022-07-05 | End: 2022-07-05

## 2022-07-05 RX ORDER — LIDOCAINE 40 MG/G
CREAM TOPICAL ONCE
Status: CANCELLED | OUTPATIENT
Start: 2022-07-05 | End: 2022-07-05

## 2022-07-05 RX ORDER — BACITRACIN ZINC AND POLYMYXIN B SULFATE 500; 1000 [USP'U]/G; [USP'U]/G
OINTMENT TOPICAL ONCE
Status: CANCELLED | OUTPATIENT
Start: 2022-07-05 | End: 2022-07-05

## 2022-07-05 RX ORDER — CLOBETASOL PROPIONATE 0.5 MG/G
OINTMENT TOPICAL ONCE
Status: CANCELLED | OUTPATIENT
Start: 2022-07-05 | End: 2022-07-05

## 2022-07-05 RX ORDER — LIDOCAINE HYDROCHLORIDE 40 MG/ML
SOLUTION TOPICAL ONCE
Status: COMPLETED | OUTPATIENT
Start: 2022-07-05 | End: 2022-07-05

## 2022-07-05 RX ADMIN — LIDOCAINE HYDROCHLORIDE 5 ML: 40 SOLUTION TOPICAL at 14:35

## 2022-07-05 NOTE — PROGRESS NOTES
Wound Healing Center Followup Visit Note    Referring Physician : Cirilo Sparrow MD  Jeremi Dutton  MEDICAL RECORD NUMBER:  [id number]  AGE: 42 y.o. GENDER: male  : 1975  EPISODE DATE:  2022    Subjective:     Chief Complaint   Patient presents with    Wound Check     LEFT SHOULDER      HISTORY of PRESENT ILLNESS HPI   Jeremi Dutton is a 42 y.o. male who presents today in regards to follow up evaluation and treatment of wound/ulcer. That patient's past medical, family and social hx were reviewed and changes were made if present. History of Wound Context:  The patient has a wound of the left shoulder which underwent operative debridement for a necrotizing soft tissue infection. It has been improving with santyl.     Wound/Ulcer Pain Timing/Severity: intermittent  Quality of pain: burning  Severity:  4  10   Modifying Factors: Pain is relieved/improved with rest  Associated Signs/Symptoms: pain    Ulcer Identification:  Ulcer Type: non-healing surgical  Contributing Factors: poor glucose control    Diabetic/Pressure/Non Pressure Ulcers only:  Ulcer: Non-Pressure ulcer, muscle necrosis    Wound: N/A        PAST MEDICAL HISTORY      Diagnosis Date    Hyperlipidemia     Hypertension     Kidney stones     Multiple times    Type II or unspecified type diabetes mellitus without mention of complication, not stated as uncontrolled      Past Surgical History:   Procedure Laterality Date    ABDOMEN SURGERY Left 2022    INCISION AND DRAINAGE LEFT BACK ABSCESS performed by Adrienne Nelson MD at [address] [place] [address]    Lt leg & ankle fx    TONSILLECTOMY       Family History   Problem Relation Age of Onset    Diabetes Mother     High Blood Pressure Mother     Cancer Father         prostate    Heart Disease Father         MI @ 52yrs old/ also 1 stent     Social History     Tobacco Use    Smoking status: Never Smoker    Smokeless tobacco: Never Used Monitoring Suppl (ONE TOUCH II TEST STRIP RESENDEZ) by Does not apply route. No current facility-administered medications on file prior to encounter. REVIEW OF SYSTEMS See HPI    Objective:    BP (!) 152/84   Pulse 96   Temp 99.8 °F (37.7 °C) (Temporal)   Resp 18   Ht 5' 10\" (1.778 m)   Wt 252 lb (114.3 kg)   BMI 36.16 kg/m²   Wt Readings from Last 3 Encounters:   07/05/22 252 lb (114.3 kg)   06/14/22 252 lb (114.3 kg)   06/08/22 259 lb (117.5 kg)     PHYSICAL EXAM  CONSTITUTIONAL:   Awake, alert, cooperative   EYES:  lids and lashes normal   ENT: external ears and nose without lesions   NECK:  supple, symmetrical, trachea midline   SKIN:  Open wound Present    Assessment:     Problem List Items Addressed This Visit     * (Principal) Necrotizing soft tissue infection - Primary    Relevant Orders    Initiate Outpatient Wound Care Protocol          Pre Debridement Measurements:  Are located in the [place]  Documentation Flow Sheet  Post Debridement Measurements:  Wound/Ulcer Descriptions are Pre Debridement except measurements:     Wound 05/31/22 Shoulder Posterior; Left #1 Wound LEFT SHOULDER  (Active)   Wound Image   06/14/22 1404   Dressing Status New dressing applied 06/21/22 1500   Wound Cleansed Cleansed with saline 06/21/22 1500   Dressing/Treatment Dry dressing;ABD;Hydrating gel 06/21/22 1500   Wound Length (cm) 2.2 cm 07/05/22 1431   Wound Width (cm) 1.7 cm 07/05/22 1431   Wound Depth (cm) 0.6 cm 07/05/22 1431   Wound Surface Area (cm^2) 3.74 cm^2 07/05/22 1431   Change in Wound Size % (l*w) 76.98 07/05/22 1431   Wound Volume (cm^3) 2.244 cm^3 07/05/22 1431   Wound Healing % 95 07/05/22 1431   Post-Procedure Length (cm) 2.3 cm 07/05/22 1442   Post-Procedure Width (cm) 2 cm 07/05/22 1442   Post-Procedure Depth (cm) 0.7 cm 07/05/22 1442   Post-Procedure Surface Area (cm^2) 4.6 cm^2 07/05/22 1442   Post-Procedure Volume (cm^3) 3.22 cm^3 07/05/22 1442   Wound Assessment Granulation visit _____________1 week________________  (Please note your next appointment above and if you are unable to keep, kindly give a 24 hour notice.  Thank you.)     Physician signature:__________________________        If you experience any of the following, please call the [address] during business hours:     * Increase in Pain  * Temperature over 101  * Increase in drainage from your wound  * Drainage with a foul odor  * Bleeding  * Increase in swelling  * Need for compression bandage changes due to slippage, breakthrough drainage.     If you need medical attention outside of the business hours of the [address] please contact your PCP or go to the nearest emergency room.                                Electronically signed by Cristel Aguilar MD on 7/5/2022 at 2:44 PM

## 2022-07-12 ENCOUNTER — HOSPITAL ENCOUNTER (OUTPATIENT)
Dept: WOUND CARE | Age: 47
Discharge: HOME | End: 2022-07-12
Payer: COMMERCIAL

## 2022-07-12 VITALS
RESPIRATION RATE: 18 BRPM | WEIGHT: 252 LBS | DIASTOLIC BLOOD PRESSURE: 88 MMHG | TEMPERATURE: 98.6 F | HEIGHT: 70 IN | BODY MASS INDEX: 36.08 KG/M2 | SYSTOLIC BLOOD PRESSURE: 120 MMHG | HEART RATE: 96 BPM

## 2022-07-12 DIAGNOSIS — M79.89 NECROTIZING SOFT TISSUE INFECTION: Primary | ICD-10-CM

## 2022-07-12 PROCEDURE — 6370000000 HC RX 637 (ALT 250 FOR IP): Performed by: SURGERY

## 2022-07-12 PROCEDURE — 11042 DBRDMT SUBQ TIS 1ST 20SQCM/<: CPT | Performed by: SURGERY

## 2022-07-12 PROCEDURE — 11042 DBRDMT SUBQ TIS 1ST 20SQCM/<: CPT

## 2022-07-12 RX ORDER — LIDOCAINE 50 MG/G
OINTMENT TOPICAL ONCE
Status: CANCELLED | OUTPATIENT
Start: 2022-07-12 | End: 2022-07-12

## 2022-07-12 RX ORDER — BACITRACIN, NEOMYCIN, POLYMYXIN B 400; 3.5; 5 [USP'U]/G; MG/G; [USP'U]/G
OINTMENT TOPICAL ONCE
Status: CANCELLED | OUTPATIENT
Start: 2022-07-12 | End: 2022-07-12

## 2022-07-12 RX ORDER — GINSENG 100 MG
CAPSULE ORAL ONCE
Status: CANCELLED | OUTPATIENT
Start: 2022-07-12 | End: 2022-07-12

## 2022-07-12 RX ORDER — LIDOCAINE HYDROCHLORIDE 40 MG/ML
SOLUTION TOPICAL ONCE
Status: COMPLETED | OUTPATIENT
Start: 2022-07-12 | End: 2022-07-12

## 2022-07-12 RX ORDER — LIDOCAINE HYDROCHLORIDE 20 MG/ML
JELLY TOPICAL ONCE
Status: CANCELLED | OUTPATIENT
Start: 2022-07-12 | End: 2022-07-12

## 2022-07-12 RX ORDER — CLOBETASOL PROPIONATE 0.5 MG/G
OINTMENT TOPICAL ONCE
Status: CANCELLED | OUTPATIENT
Start: 2022-07-12 | End: 2022-07-12

## 2022-07-12 RX ORDER — LIDOCAINE HYDROCHLORIDE 40 MG/ML
SOLUTION TOPICAL ONCE
Status: CANCELLED | OUTPATIENT
Start: 2022-07-12 | End: 2022-07-12

## 2022-07-12 RX ORDER — BACITRACIN ZINC AND POLYMYXIN B SULFATE 500; 1000 [USP'U]/G; [USP'U]/G
OINTMENT TOPICAL ONCE
Status: CANCELLED | OUTPATIENT
Start: 2022-07-12 | End: 2022-07-12

## 2022-07-12 RX ORDER — LIDOCAINE 40 MG/G
CREAM TOPICAL ONCE
Status: CANCELLED | OUTPATIENT
Start: 2022-07-12 | End: 2022-07-12

## 2022-07-12 RX ORDER — GENTAMICIN SULFATE 1 MG/G
OINTMENT TOPICAL ONCE
Status: CANCELLED | OUTPATIENT
Start: 2022-07-12 | End: 2022-07-12

## 2022-07-12 RX ORDER — BETAMETHASONE DIPROPIONATE 0.05 %
OINTMENT (GRAM) TOPICAL ONCE
Status: CANCELLED | OUTPATIENT
Start: 2022-07-12 | End: 2022-07-12

## 2022-07-12 RX ADMIN — LIDOCAINE HYDROCHLORIDE 5 ML: 40 SOLUTION TOPICAL at 15:40

## 2022-07-12 NOTE — PLAN OF CARE
Problem: Chronic Conditions and Co-morbidities  Goal: Patient's chronic conditions and co-morbidity symptoms are monitored and maintained or improved  Outcome: Progressing     Problem: Chronic Conditions and Co-morbidities  Goal: Patient's chronic conditions and co-morbidity symptoms are monitored and maintained or improved  Outcome: Progressing     Problem: Cognitive:  Goal: Knowledge of wound care  Description: Knowledge of wound care  Outcome: Progressing  Goal: Understands risk factors for wounds  Description: Understands risk factors for wounds  Outcome: Progressing     Problem: Wound:  Goal: Will show signs of wound healing; wound closure and no evidence of infection  Description: Will show signs of wound healing; wound closure and no evidence of infection  Outcome: Progressing

## 2022-07-12 NOTE — PROGRESS NOTES
Wound Healing Center Followup Visit Note    Referring Physician : Robin Stanley MD  Marycruz Bettencourt  MEDICAL RECORD NUMBER:  [id number]  AGE: 42 y.o. GENDER: male  : 1975  EPISODE DATE:  2022    Subjective:     Chief Complaint   Patient presents with    Wound Check     shoulder      HISTORY of PRESENT ILLNESS HPI   Marycruz Bettencourt is a 42 y.o. male who presents today in regards to follow up evaluation and treatment of wound/ulcer. That patient's past medical, family and social hx were reviewed and changes were made if present. History of Wound Context:  The patient has a wound of the left shoulder which underwent operative debridement for a necrotizing soft tissue infection. It has been improving with santyl.     Wound/Ulcer Pain Timing/Severity: intermittent  Quality of pain: burning  Severity:   10   Modifying Factors: Pain is relieved/improved with rest  Associated Signs/Symptoms: pain    Ulcer Identification:  Ulcer Type: non-healing surgical  Contributing Factors: poor glucose control    Diabetic/Pressure/Non Pressure Ulcers only:  Ulcer: Non-Pressure ulcer, muscle necrosis    Wound: N/A        PAST MEDICAL HISTORY      Diagnosis Date    Hyperlipidemia     Hypertension     Kidney stones     Multiple times    Type II or unspecified type diabetes mellitus without mention of complication, not stated as uncontrolled      Past Surgical History:   Procedure Laterality Date    ABDOMEN SURGERY Left 2022    INCISION AND DRAINAGE LEFT BACK ABSCESS performed by Hannah Oconnor MD at [address] [place] [address]    Lt leg & ankle fx    TONSILLECTOMY       Family History   Problem Relation Age of Onset    Diabetes Mother     High Blood Pressure Mother     Cancer Father         prostate    Heart Disease Father         MI @ 52yrs old/ also 1 stent     Social History     Tobacco Use    Smoking status: Never Smoker    Smokeless tobacco: Never Used   Vaping Use    Vaping Use: Never used   Substance Use Topics    Alcohol use: No     Comment: soc    Drug use: No     Allergies   Allergen Reactions    Sulfa Antibiotics Hives     Current Outpatient Medications on File Prior to Encounter   Medication Sig Dispense Refill    Continuous Blood Gluc Sensor (FREESTYLE WAGNER 2 SENSOR) MISC Change sensor every 14 days 6 each 3    allopurinol (ZYLOPRIM) 300 MG tablet TAKE 1 TABLET BY MOUTH EVERY DAY 90 tablet 3    metFORMIN (GLUCOPHAGE) 1000 MG tablet 1,000 mg 2 times daily (with meals)       metoprolol tartrate (LOPRESSOR) 25 MG tablet Take 1 tablet by mouth 2 times daily 60 tablet 0    apixaban (ELIQUIS) 5 MG TABS tablet Take 1 tablet by mouth 2 times daily 60 tablet 0    Insulin Pen Needle 32G X 4 MM MISC 1 each by Does not apply route daily 100 each 0    insulin detemir (LEVEMIR FLEXTOUCH) 100 UNIT/ML injection pen Inject 45 units daily in the morning (Patient taking differently: 50 Units Inject 50 units daily in the morning) 15 pen 3    insulin aspart (NOVOLOG FLEXPEN) 100 UNIT/ML injection pen Inject 15 units with meals + sliding scale.  -200 add 3U, -250 add 6U, -300 add 9U, -350 add 12U, -400 add 15U, BS over 400 add 18U 15 pen 3    lisinopril (PRINIVIL;ZESTRIL) 20 MG tablet TAKE 1 TABLET BY MOUTH EVERY DAY 90 tablet 3    escitalopram (LEXAPRO) 10 MG tablet TAKE 1 TABLET BY MOUTH EVERY DAY 90 tablet 1    rosuvastatin (CRESTOR) 20 MG tablet TAKE 1 TABLET BY MOUTH EVERYDAY AT BEDTIME 30 tablet 5    FARXIGA 10 MG tablet TAKE 1 TABLET BY MOUTH EVERY DAY IN THE MORNING 90 tablet 3    omega-3 acid ethyl esters (LOVAZA) 1 g capsule TAKE 2 CAPSULES BY MOUTH TWICE A  capsule 3    Glucose Blood (BLOOD GLUCOSE TEST STRIPS) STRP Test four times a day 360 strip 3    BD ULTRA-FINE PEN NEEDLES 29G X 12.7MM MISC USE 5 TIMES DAILY OR AS DIRECTED 500 each 3    aspirin 81 MG tablet Take 81 mg by mouth daily       Blood Glucose Monitoring Suppl (ONE TOUCH II TEST STRIP RESENDEZ) by Does not apply route. No current facility-administered medications on file prior to encounter. REVIEW OF SYSTEMS See HPI    Objective:    /88   Pulse 96   Temp 98.6 °F (37 °C) (Temporal)   Resp 18   Ht 5' 10\" (1.778 m)   Wt 252 lb (114.3 kg)   BMI 36.16 kg/m²   Wt Readings from Last 3 Encounters:   07/12/22 252 lb (114.3 kg)   07/05/22 252 lb (114.3 kg)   06/14/22 252 lb (114.3 kg)     PHYSICAL EXAM  CONSTITUTIONAL:   Awake, alert, cooperative   EYES:  lids and lashes normal   ENT: external ears and nose without lesions   NECK:  supple, symmetrical, trachea midline   SKIN:  Open wound Present    Assessment:     Problem List Items Addressed This Visit     * (Principal) Necrotizing soft tissue infection - Primary    Relevant Orders    Initiate Outpatient Wound Care Protocol          Pre Debridement Measurements:  Are located in the [place]  Documentation Flow Sheet  Post Debridement Measurements:  Wound/Ulcer Descriptions are Pre Debridement except measurements:     Wound 05/31/22 Shoulder Posterior; Left #1 Wound LEFT SHOULDER  (Active)   Wound Image   06/14/22 1404   Dressing Status New dressing applied 06/21/22 1500   Wound Cleansed Cleansed with saline 06/21/22 1500   Dressing/Treatment Dry dressing;ABD;Hydrating gel 06/21/22 1500   Wound Length (cm) 1 cm 07/12/22 1538   Wound Width (cm) 1 cm 07/12/22 1538   Wound Depth (cm) 0.3 cm 07/12/22 1538   Wound Surface Area (cm^2) 1 cm^2 07/12/22 1538   Change in Wound Size % (l*w) 93.85 07/12/22 1538   Wound Volume (cm^3) 0.3 cm^3 07/12/22 1538   Wound Healing % 99 07/12/22 1538   Post-Procedure Length (cm) 1 cm 07/12/22 1543   Post-Procedure Width (cm) 1.2 cm 07/12/22 1543   Post-Procedure Depth (cm) 0.5 cm 07/12/22 1543   Post-Procedure Surface Area (cm^2) 1.2 cm^2 07/12/22 1543   Post-Procedure Volume (cm^3) 0.6 cm^3 07/12/22 1543   Wound Assessment Fibrin;Granulation tissue 07/12/22 0893   Drainage Amount Small 07/12/22 1538   Drainage Description Yellow; Thick 07/12/22 1538   Odor None 07/12/22 1538   Soco-wound Assessment Intact 07/12/22 1538   Wound Thickness Description not for Pressure Injury Full thickness 06/21/22 1400   Number of days: 41          Procedure Note  Indications:  Based on my examination of this patient's wound(s)/ulcer(s) today, debridement is required to promote healing and evaluate the wound base. Performed by: Evelyne Tony MD    Consent obtained:  Yes    Time out taken:  Yes    Pain Control: Anesthetic  Anesthetic: 4% Lidocaine Liquid Topical     Debridement:Excisional Debridement    Using curette ans scissors the wound(s)/ulcer(s) was/were sharply debrided down through and including the removal of subcutaneous tissue. Devitalized Tissue Debrided:  fibrin, biofilm, slough, exudate and callus to stimulate bleeding to promote healing, post debridement good bleeding base and wound edges noted    Wound/Ulcer #: 1    Percent of Wound/Ulcer Debrided: 100%    Total Surface Area Debrided:  1.2 sq cm     Estimated Blood Loss:  Minimal  Hemostasis Achieved:  by pressure    Procedural Pain:  9  / 10   Post Procedural Pain:  4 / 10     Response to treatment:  Well tolerated by patient. Plan:   Treatment Note please see attached Discharge Instructions    Written patient dismissal instructions given to patient and signed by patient or POA.          Discharge Instructions        Visit Discharge/Physician Orders     Discharge condition: Stable     Assessment of pain at discharge:minimal     Anesthetic used: 4% lidocaine     Discharge to: Home     Left via:Private automobile     Accompanied by: accompanied by         ECF/HHA: [facility]     Dressing Orders:left upper back cleanse with normal saline apply santyl and dry dressing daily.     Treatment Orders:  Eat foods high in protein and vitamin c     Multivitamin daily     [facility] followup visit _____________1 week________________  (Please note your next appointment above and if you are unable to keep, kindly give a 24 hour notice.  Thank you.)     Physician signature:__________________________        If you experience any of the following, please call the [facility] [address] during business hours:     * Increase in Pain  * Temperature over 101  * Increase in drainage from your wound  * Drainage with a foul odor  * Bleeding  * Increase in swelling  * Need for compression bandage changes due to slippage, breakthrough drainage.     If you need medical attention outside of the business hours of the [facility] please contact your PCP or go to the nearest emergency room.                                                     Electronically signed by Evelyne Tony MD on 7/12/2022 at 3:44 PM

## 2022-07-15 ENCOUNTER — OFFICE VISIT (OUTPATIENT)
Dept: FAMILY MEDICINE CLINIC | Age: 47
End: 2022-07-15
Payer: COMMERCIAL

## 2022-07-15 VITALS
SYSTOLIC BLOOD PRESSURE: 122 MMHG | WEIGHT: 265.4 LBS | HEART RATE: 94 BPM | BODY MASS INDEX: 39.31 KG/M2 | TEMPERATURE: 98.2 F | HEIGHT: 69 IN | DIASTOLIC BLOOD PRESSURE: 86 MMHG | OXYGEN SATURATION: 95 % | RESPIRATION RATE: 16 BRPM

## 2022-07-15 DIAGNOSIS — E11.65 UNCONTROLLED TYPE 2 DIABETES MELLITUS WITH HYPERGLYCEMIA (HCC): Primary | ICD-10-CM

## 2022-07-15 DIAGNOSIS — L02.212 BACK ABSCESS: ICD-10-CM

## 2022-07-15 DIAGNOSIS — I10 ESSENTIAL HYPERTENSION: ICD-10-CM

## 2022-07-15 DIAGNOSIS — E11.65 POORLY CONTROLLED TYPE 2 DIABETES MELLITUS (HCC): Primary | ICD-10-CM

## 2022-07-15 PROCEDURE — G8417 CALC BMI ABV UP PARAM F/U: HCPCS | Performed by: FAMILY MEDICINE

## 2022-07-15 PROCEDURE — 99214 OFFICE O/P EST MOD 30 MIN: CPT | Performed by: FAMILY MEDICINE

## 2022-07-15 PROCEDURE — 3046F HEMOGLOBIN A1C LEVEL >9.0%: CPT | Performed by: FAMILY MEDICINE

## 2022-07-15 PROCEDURE — G8427 DOCREV CUR MEDS BY ELIG CLIN: HCPCS | Performed by: FAMILY MEDICINE

## 2022-07-15 PROCEDURE — 2022F DILAT RTA XM EVC RTNOPTHY: CPT | Performed by: FAMILY MEDICINE

## 2022-07-15 PROCEDURE — 1036F TOBACCO NON-USER: CPT | Performed by: FAMILY MEDICINE

## 2022-07-15 RX ORDER — BLOOD-GLUCOSE TRANSMITTER
EACH MISCELLANEOUS
Qty: 1 EACH | Refills: 0 | Status: SHIPPED
Start: 2022-07-15 | End: 2022-10-03 | Stop reason: SDUPTHER

## 2022-07-15 RX ORDER — BLOOD-GLUCOSE,RECEIVER,CONT
EACH MISCELLANEOUS
Qty: 1 EACH | Refills: 0 | Status: SHIPPED
Start: 2022-07-15 | End: 2022-07-18 | Stop reason: SDUPTHER

## 2022-07-15 RX ORDER — BLOOD-GLUCOSE SENSOR
EACH MISCELLANEOUS
Qty: 6 EACH | Refills: 3 | Status: SHIPPED
Start: 2022-07-15 | End: 2022-10-03 | Stop reason: SDUPTHER

## 2022-07-15 RX ORDER — BLOOD-GLUCOSE TRANSMITTER
EACH MISCELLANEOUS
Qty: 1 EACH | Refills: 0 | Status: SHIPPED
Start: 2022-07-15 | End: 2022-07-15 | Stop reason: SDUPTHER

## 2022-07-15 NOTE — DISCHARGE INSTRUCTIONS
Visit Discharge/Physician Orders     Discharge condition: Stable     Assessment of pain at discharge:minimal     Anesthetic used: 4% lidocaine     Discharge to: Home     Left via:Private automobile     Accompanied by: accompanied by        [facility]: [facility]     Dressing Orders:left upper back cleanse with normal saline apply santyl and dry dressing daily. Treatment Orders:  Eat foods high in protein and vitamin c     Multivitamin daily     [facility] followup visit _____________1 week________________  (Please note your next appointment above and if you are unable to keep, kindly give a 24 hour notice. Thank you.)     Physician signature:__________________________        If you experience any of the following, please call the [facility] during business hours:     * Increase in Pain  * Temperature over 101  * Increase in drainage from your wound  * Drainage with a foul odor  * Bleeding  * Increase in swelling  * Need for compression bandage changes due to slippage, breakthrough drainage. If you need medical attention outside of the business hours of the [facility] please contact your PCP or go to the nearest emergency room.

## 2022-07-15 NOTE — PROGRESS NOTES
DM2:   Patient is here to fu regarding DM2. Pt was last seen for skin infection of the back. He was admitted for IV abx and had surgical I&D and debridement. He goes to wound care once a week. It is improving. He saw endo who adjusted his meds. HM reviewed. Lab Results   Component Value Date    LABA1C 12.7 (H) 05/27/2022       Lab Results   Component Value Date    LDLCALC 162 (H) 04/19/2022        Patient's past medical, surgical, social and/or family history reviewed, updated in chart, and are non-contributory (unless otherwise stated). Medications and allergies also reviewed and updated in chart.       Review of Systems:  Constitutional:  No fever, no fatigue, no chills, no headaches, no weight change  Dermatology:  No rash, no mole, no dry or sensitive skin  ENT:  No cough, no sore throat, no sinus pain, no runny nose, no ear pain  Cardiology:  No chest pain, no palpitations, no leg edema, no shortness of breath, no PND  Gastroenterology:  No dysphagia, no abdominal pain, no nausea, no vomiting, no constipation, no diarrhea, no heartburn  Musculoskeletal:  No joint pain, no leg cramps, no back pain, no muscle aches  Respiratory:  No shortness of breath, no orthopnea, no wheezing, no GARCIA, no hemoptysis  Urology:  No blood in the urine, no urinary frequency, no urinary incontinence, no urinary urgency, no nocturia, no dysuria  Vitals:    07/15/22 0807   BP: 122/86   Pulse: 94   Resp: 16   Temp: 98.2 °F (36.8 °C)   TempSrc: Temporal   SpO2: 95%   Weight: 265 lb 6.4 oz (120.4 kg)   Height: 5' 9\" (1.753 m)       General:  Patient alert and oriented x 3, NAD, pleasant  HEENT:  Atraumatic, normocephalic, PERRLA, EOMI, clear conjunctiva, TMs clear, nose-clear, throat - no erythema  Neck:  Supple, no goiter, no carotid bruits, no lymphadenopathy  Lungs:  CTA B  Heart:  RRR, no murmurs, gallops or rubs  Abdomen:  Soft/nt/nd, + bowel sounds  Extremities:  No clubbing, cyanosis or edema  Skin: upper back with open wound that is healing. See wound care note    Assessment/Plan:      Eagle Arevalo was seen today for diabetes. Diagnoses and all orders for this visit:    Uncontrolled type 2 diabetes mellitus with hyperglycemia ([address]    Essential hypertension    Back abscess    Other orders  -     apixaban (ELIQUIS) 5 MG TABS tablet; Take 1 tablet by mouth in the morning and 1 tablet before bedtime.  -     metoprolol tartrate (LOPRESSOR) 25 MG tablet; Take 1 tablet by mouth in the morning and 1 tablet before bedtime.  -     Continuous Blood Gluc  ([address]) [address]; Dx: DM2  -     Continuous Blood Gluc Sensor (DEXCOM G6 SENSOR) MISC; Apply every two weeks  -     Continuous Blood Gluc Transmit (DEXCOM G6 TRANSMITTER) MISC; Dx: DM2    As above. Call or go to ED immediately if symptoms worsen or persist.  Return in about 5 months (around 12/15/2022). , or sooner if necessary. Educational materials and/or home exercises printed for patient's review and were included in patient instructions on his/her After Visit Summary and given to patient at the end of visit. Counseled regarding above diagnosis, including possible risks and complications,  especially if left uncontrolled. Counseled regarding the possible side effects, risks, benefits and alternatives to treatment; patient and/or guardian verbalizes understanding, agrees, feels comfortable with and wishes to proceed with above treatment plan. Advised patient to call with any new medication issues, and read all Rx info from pharmacy to assure aware of all possible risks and side effects of medication before taking. Reviewed age and gender appropriate health screening exams and vaccinations.   Advised patient regarding importance of keeping up with recommended health maintenance and to schedule as soon as possible if overdue, as this is important in assessing for undiagnosed pathology, especially cancer, as well as protecting against potentially harmful/life threatening disease. Patient and/or guardian verbalizes understanding and agrees with above counseling, assessment and plan. All questions answered. Billy Yoon MD  7/15/2022    I have personally reviewed and updated the chief complaint, HPI, Past Medical, Family and Social History, as well as the above Review of Systems.

## 2022-07-18 RX ORDER — BLOOD-GLUCOSE,RECEIVER,CONT
EACH MISCELLANEOUS
Qty: 1 EACH | Refills: 0 | Status: SHIPPED | OUTPATIENT
Start: 2022-07-18

## 2022-07-19 ENCOUNTER — HOSPITAL ENCOUNTER (OUTPATIENT)
Dept: WOUND CARE | Age: 47
Discharge: HOME | End: 2022-07-19
Payer: COMMERCIAL

## 2022-07-19 VITALS
HEIGHT: 69 IN | SYSTOLIC BLOOD PRESSURE: 112 MMHG | BODY MASS INDEX: 39.25 KG/M2 | RESPIRATION RATE: 18 BRPM | HEART RATE: 82 BPM | TEMPERATURE: 97.9 F | WEIGHT: 265 LBS | DIASTOLIC BLOOD PRESSURE: 60 MMHG

## 2022-07-19 DIAGNOSIS — S21.202D OPEN WOUND OF LEFT SIDE OF BACK, SUBSEQUENT ENCOUNTER: ICD-10-CM

## 2022-07-19 DIAGNOSIS — M79.89 NECROTIZING SOFT TISSUE INFECTION: Primary | ICD-10-CM

## 2022-07-19 PROBLEM — S21.202A OPEN WOUND OF LEFT SIDE OF BACK: Status: ACTIVE | Noted: 2022-07-19

## 2022-07-19 LAB
AFB CULTURE (MYCOBACTERIA): NORMAL
AFB SMEAR: NORMAL

## 2022-07-19 PROCEDURE — 97597 DBRDMT OPN WND 1ST 20 CM/<: CPT | Performed by: SURGERY

## 2022-07-19 PROCEDURE — 97597 DBRDMT OPN WND 1ST 20 CM/<: CPT

## 2022-07-19 RX ORDER — BACITRACIN, NEOMYCIN, POLYMYXIN B 400; 3.5; 5 [USP'U]/G; MG/G; [USP'U]/G
OINTMENT TOPICAL ONCE
Status: CANCELLED | OUTPATIENT
Start: 2022-07-19 | End: 2022-07-19

## 2022-07-19 RX ORDER — BETAMETHASONE DIPROPIONATE 0.05 %
OINTMENT (GRAM) TOPICAL ONCE
Status: CANCELLED | OUTPATIENT
Start: 2022-07-19 | End: 2022-07-19

## 2022-07-19 RX ORDER — LIDOCAINE HYDROCHLORIDE 20 MG/ML
JELLY TOPICAL ONCE
Status: CANCELLED | OUTPATIENT
Start: 2022-07-19 | End: 2022-07-19

## 2022-07-19 RX ORDER — LIDOCAINE 40 MG/G
CREAM TOPICAL ONCE
Status: CANCELLED | OUTPATIENT
Start: 2022-07-19 | End: 2022-07-19

## 2022-07-19 RX ORDER — LIDOCAINE 50 MG/G
OINTMENT TOPICAL ONCE
Status: CANCELLED | OUTPATIENT
Start: 2022-07-19 | End: 2022-07-19

## 2022-07-19 RX ORDER — LIDOCAINE HYDROCHLORIDE 40 MG/ML
SOLUTION TOPICAL ONCE
Status: DISCONTINUED | OUTPATIENT
Start: 2022-07-19 | End: 2022-07-20 | Stop reason: HOSPADM

## 2022-07-19 RX ORDER — GINSENG 100 MG
CAPSULE ORAL ONCE
Status: CANCELLED | OUTPATIENT
Start: 2022-07-19 | End: 2022-07-19

## 2022-07-19 RX ORDER — GENTAMICIN SULFATE 1 MG/G
OINTMENT TOPICAL ONCE
Status: CANCELLED | OUTPATIENT
Start: 2022-07-19 | End: 2022-07-19

## 2022-07-19 RX ORDER — BACITRACIN ZINC AND POLYMYXIN B SULFATE 500; 1000 [USP'U]/G; [USP'U]/G
OINTMENT TOPICAL ONCE
Status: CANCELLED | OUTPATIENT
Start: 2022-07-19 | End: 2022-07-19

## 2022-07-19 RX ORDER — CLOBETASOL PROPIONATE 0.5 MG/G
OINTMENT TOPICAL ONCE
Status: CANCELLED | OUTPATIENT
Start: 2022-07-19 | End: 2022-07-19

## 2022-07-19 RX ORDER — LIDOCAINE HYDROCHLORIDE 40 MG/ML
SOLUTION TOPICAL ONCE
Status: CANCELLED | OUTPATIENT
Start: 2022-07-19 | End: 2022-07-19

## 2022-07-19 NOTE — PROGRESS NOTES
Wound Healing Center Followup Visit Note    Referring Physician : Jesica Cottrell MD  Christy Garrido  MEDICAL RECORD NUMBER:  [id number]  AGE: 42 y.o. GENDER: male  : 1975  EPISODE DATE:  2022    Subjective:     Chief Complaint   Patient presents with    Wound Check     SHOULDER       HISTORY of PRESENT ILLNESS HPI   Christy Garrido is a 42 y.o. male who presents today in regards to follow up evaluation and treatment of wound/ulcer. That patient's past medical, family and social hx were reviewed and changes were made if present. History of Wound Context:  The patient has a wound of the left shoulder which underwent operative debridement for a necrotizing soft tissue infection. It has been improving with santyl.     Wound/Ulcer Pain Timing/Severity: intermittent  Quality of pain: burning  Severity:  4 / 10   Modifying Factors: Pain is relieved/improved with rest  Associated Signs/Symptoms: pain      2022  Wound of the back, skin looks improved, skin only today        Ulcer Identification:  Ulcer Type: non-healing surgical  Contributing Factors: poor glucose control    Diabetic/Pressure/Non Pressure Ulcers only:  Ulcer: Non-Pressure ulcer, muscle necrosis    Wound: N/A        PAST MEDICAL HISTORY      Diagnosis Date    Hyperlipidemia     Hypertension     Kidney stones     Multiple times    Type II or unspecified type diabetes mellitus without mention of complication, not stated as uncontrolled      Past Surgical History:   Procedure Laterality Date    ABDOMEN SURGERY Left 2022    INCISION AND DRAINAGE LEFT BACK ABSCESS performed by Susi Hernandez MD at [address] [place] [address] leg & ankle fx    TONSILLECTOMY       Family History   Problem Relation Age of Onset    Diabetes Mother     High Blood Pressure Mother     Cancer Father         prostate    Heart Disease Father         MI @ 52yrs old/ also 1 stent     Social History     Tobacco Use    Smoking status: Never    Smokeless tobacco: Never   Vaping Use    Vaping Use: Never used   Substance Use Topics    Alcohol use: No     Comment: soc    Drug use: No     Allergies   Allergen Reactions    Sulfa Antibiotics Hives     Current Outpatient Medications on File Prior to Encounter   Medication Sig Dispense Refill    Continuous Blood Gluc  (DEXCOM G6 ) MIKY Dx: DM2 1 each 0    apixaban (ELIQUIS) 5 MG TABS tablet Take 1 tablet by mouth in the morning and 1 tablet before bedtime. 180 tablet 3    metoprolol tartrate (LOPRESSOR) 25 MG tablet Take 1 tablet by mouth in the morning and 1 tablet before bedtime. 180 tablet 3    Continuous Blood Gluc Sensor (DEXCOM G6 SENSOR) MISC Apply every two weeks 6 each 3    Continuous Blood Gluc Transmit (DEXCOM G6 TRANSMITTER) MISC Dx: DM2 1 each 0    Continuous Blood Gluc Sensor (FREESTYLE WAGNER 2 SENSOR) MISC Change sensor every 14 days 6 each 3    allopurinol (ZYLOPRIM) 300 MG tablet TAKE 1 TABLET BY MOUTH EVERY DAY 90 tablet 3    metFORMIN (GLUCOPHAGE) 1000 MG tablet 1,000 mg 2 times daily (with meals)       Insulin Pen Needle 32G X 4 MM MISC 1 each by Does not apply route daily 100 each 0    insulin detemir (LEVEMIR FLEXTOUCH) 100 UNIT/ML injection pen Inject 45 units daily in the morning (Patient taking differently: 50 Units Inject 50 units daily in the morning) 15 pen 3    insulin aspart (NOVOLOG FLEXPEN) 100 UNIT/ML injection pen Inject 15 units with meals + sliding scale.  -200 add 3U, -250 add 6U, -300 add 9U, -350 add 12U, -400 add 15U, BS over 400 add 18U 15 pen 3    lisinopril (PRINIVIL;ZESTRIL) 20 MG tablet TAKE 1 TABLET BY MOUTH EVERY DAY 90 tablet 3    escitalopram (LEXAPRO) 10 MG tablet TAKE 1 TABLET BY MOUTH EVERY DAY 90 tablet 1    rosuvastatin (CRESTOR) 20 MG tablet TAKE 1 TABLET BY MOUTH EVERYDAY AT BEDTIME 30 tablet 5    FARXIGA 10 MG tablet TAKE 1 TABLET BY MOUTH EVERY DAY IN THE MORNING 90 tablet 3    omega-3 acid ethyl esters (LOVAZA) 1 g capsule TAKE 2 CAPSULES BY MOUTH TWICE A  capsule 3    Glucose Blood (BLOOD GLUCOSE TEST STRIPS) STRP Test four times a day 360 strip 3    BD ULTRA-FINE PEN NEEDLES 29G X 12.7MM MISC USE 5 TIMES DAILY OR AS DIRECTED 500 each 3    aspirin 81 MG tablet Take 81 mg by mouth daily       Blood Glucose Monitoring Suppl (ONE TOUCH II TEST STRIP RESENDEZ) by Does not apply route. No current facility-administered medications on file prior to encounter. REVIEW OF SYSTEMS See HPI    Objective:    /60   Pulse 82   Temp 97.9 °F (36.6 °C) (Temporal)   Resp 18   Ht 5' 9\" (1.753 m)   Wt 265 lb (120.2 kg)   BMI 39.13 kg/m²   Wt Readings from Last 3 Encounters:   07/19/22 265 lb (120.2 kg)   07/15/22 265 lb 6.4 oz (120.4 kg)   07/12/22 252 lb (114.3 kg)     PHYSICAL EXAM  CONSTITUTIONAL:   Awake, alert, cooperative   EYES:  lids and lashes normal   ENT: external ears and nose without lesions   NECK:  supple, symmetrical, trachea midline   SKIN:  Open wound Present    Assessment:     Problem List Items Addressed This Visit          Medium    Necrotizing soft tissue infection - Primary    Relevant Medications    lidocaine (XYLOCAINE) 4 % external solution (Start on 7/19/2022  3:00 PM)    Other Relevant Orders    Initiate Outpatient Wound Care Protocol    Open wound of left side of back       Pre Debridement Measurements:  Are located in the [place]  Documentation Flow Sheet  Post Debridement Measurements:  Wound/Ulcer Descriptions are Pre Debridement except measurements:     Wound 05/31/22 Shoulder Posterior; Left #1 Wound LEFT SHOULDER  (Active)   Wound Image   07/19/22 1436   Dressing Status New dressing applied 06/21/22 1500   Wound Cleansed Cleansed with saline 06/21/22 1500   Dressing/Treatment Dry dressing;ABD;Hydrating gel 06/21/22 1500   Wound Length (cm) 1 cm 07/19/22 1436   Wound Width (cm) 0.5 cm 07/19/22 1436   Wound Depth (cm) 0.1 cm 07/19/22 1436   Wound Surface Area (cm^2) 0.5 cm^2 07/19/22 1436   Change in Wound Size % (l*w) 96.92 07/19/22 1436   Wound Volume (cm^3) 0.05 cm^3 07/19/22 1436   Wound Healing % 100 07/19/22 1436   Post-Procedure Length (cm) 1 cm 07/19/22 1450   Post-Procedure Width (cm) 0.6 cm 07/19/22 1450   Post-Procedure Depth (cm) 0.2 cm 07/19/22 1450   Post-Procedure Surface Area (cm^2) 0.6 cm^2 07/19/22 1450   Post-Procedure Volume (cm^3) 0.12 cm^3 07/19/22 1450   Wound Assessment Fibrin;Granulation tissue 07/19/22 1436   Drainage Amount Small 07/19/22 1436   Drainage Description Yellow 07/19/22 1436   Odor None 07/19/22 1436   Soco-wound Assessment Intact 07/19/22 1436   Wound Thickness Description not for Pressure Injury Full thickness 06/21/22 1400   Number of days: 48          Procedure Note  Indications:  Based on my examination of this patient's wound(s)/ulcer(s) today, debridement is required to promote healing and evaluate the wound base. Performed by: Robert Martell MD    Consent obtained:  Yes    Time out taken:  Yes    Pain Control:       Debridement:Excisional Debridement    Using curette ans scissors the wound(s)/ulcer(s) was/were sharply debrided down through and including the removal of epidermis and dermis    Devitalized Tissue Debrided:  fibrin, biofilm, slough, exudate and callus to stimulate bleeding to promote healing, post debridement good bleeding base and wound edges noted    Wound/Ulcer #: 1    Percent of Wound/Ulcer Debrided: 100%    Total Surface Area Debrided: 0.5 sq cm     Estimated Blood Loss:  Minimal  Hemostasis Achieved:  by pressure    Procedural Pain:  9  / 10   Post Procedural Pain:  4 / 10     Response to treatment:  Well tolerated by patient. Plan:   Treatment Note please see attached Discharge Instructions    Written patient dismissal instructions given to patient and signed by patient or POA.          Discharge Instructions          Visit Discharge/Physician Orders     Discharge condition: Stable Assessment of pain at discharge:minimal     Anesthetic used: 4% lidocaine     Discharge to: Home     Left via:Private automobile     Accompanied by: accompanied by        [facility]     Dressing Orders:left upper back cleanse with normal saline apply santyl and dry dressing daily. Treatment Orders:  Eat foods high in protein and vitamin c     Multivitamin daily     [address] [place] [address] followup visit _____________1 week________________  (Please note your next appointment above and if you are unable to keep, kindly give a 24 hour notice. Thank you.)     Physician signature:__________________________        If you experience any of the following, please call the [email or web address] during business hours:     * Increase in Pain  * Temperature over 101  * Increase in drainage from your wound  * Drainage with a foul odor  * Bleeding  * Increase in swelling  * Need for compression bandage changes due to slippage, breakthrough drainage. If you need medical attention outside of the business hours of the [facility] [employer] please contact your PCP or go to the nearest emergency room.                                                                              Electronically signed by Bernice Falk MD on 7/19/2022 at 2:53 PM

## 2022-07-19 NOTE — PLAN OF CARE
Problem: Chronic Conditions and Co-morbidities  Goal: Patient's chronic conditions and co-morbidity symptoms are monitored and maintained or improved  Outcome: Progressing Towards Goal     Problem: Cognitive:  Goal: Knowledge of wound care  Description: Knowledge of wound care  Outcome: Progressing Towards Goal  Goal: Understands risk factors for wounds  Description: Understands risk factors for wounds  Outcome: Progressing Towards Goal     Problem: Wound:  Goal: Will show signs of wound healing; wound closure and no evidence of infection  Description: Will show signs of wound healing; wound closure and no evidence of infection  Outcome: Progressing Towards Goal

## 2022-07-19 NOTE — DISCHARGE INSTRUCTIONS
Visit Discharge/Physician Orders     Discharge condition: Stable  Assessment of pain at discharge:minimal  Anesthetic used: 4% lidocaine  Discharge to: Home  Left via:Private automobile  Accompanied by: accompanied by self  ECF/HHA:      Dressing Orders:left upper back cleanse with normal saline apply santyl and dry dressing daily. Treatment Orders:  Eat foods high in protein and vitamin c     Multivitamin daily     [facility] followup visit _____________2 weeks________________  (Please note your next appointment above and if you are unable to keep, kindly give a 24 hour notice. Thank you.)     Physician signature:__________________________        If you experience any of the following, please call the [facility] during business hours:     * Increase in Pain  * Temperature over 101  * Increase in drainage from your wound  * Drainage with a foul odor  * Bleeding  * Increase in swelling  * Need for compression bandage changes due to slippage, breakthrough drainage. If you need medical attention outside of the business hours of the [facility] please contact your PCP or go to the nearest emergency room.

## 2022-07-26 ENCOUNTER — OFFICE VISIT (OUTPATIENT)
Dept: CARDIOLOGY CLINIC | Age: 47
End: 2022-07-26
Payer: COMMERCIAL

## 2022-07-26 ENCOUNTER — HOSPITAL ENCOUNTER (OUTPATIENT)
Dept: WOUND CARE | Age: 47
Discharge: HOME | End: 2022-07-26
Payer: COMMERCIAL

## 2022-07-26 VITALS
WEIGHT: 263.2 LBS | DIASTOLIC BLOOD PRESSURE: 82 MMHG | BODY MASS INDEX: 37.68 KG/M2 | RESPIRATION RATE: 16 BRPM | HEART RATE: 101 BPM | HEIGHT: 70 IN | SYSTOLIC BLOOD PRESSURE: 118 MMHG

## 2022-07-26 VITALS
RESPIRATION RATE: 18 BRPM | HEART RATE: 66 BPM | DIASTOLIC BLOOD PRESSURE: 64 MMHG | TEMPERATURE: 96 F | WEIGHT: 263 LBS | HEIGHT: 70 IN | BODY MASS INDEX: 37.65 KG/M2 | SYSTOLIC BLOOD PRESSURE: 136 MMHG

## 2022-07-26 DIAGNOSIS — M79.89 NECROTIZING SOFT TISSUE INFECTION: Primary | ICD-10-CM

## 2022-07-26 DIAGNOSIS — I10 PRIMARY HYPERTENSION: Primary | ICD-10-CM

## 2022-07-26 PROCEDURE — 93000 ELECTROCARDIOGRAM COMPLETE: CPT | Performed by: INTERNAL MEDICINE

## 2022-07-26 PROCEDURE — 99214 OFFICE O/P EST MOD 30 MIN: CPT | Performed by: INTERNAL MEDICINE

## 2022-07-26 PROCEDURE — G8427 DOCREV CUR MEDS BY ELIG CLIN: HCPCS | Performed by: INTERNAL MEDICINE

## 2022-07-26 PROCEDURE — 11042 DBRDMT SUBQ TIS 1ST 20SQCM/<: CPT | Performed by: SURGERY

## 2022-07-26 PROCEDURE — 6370000000 HC RX 637 (ALT 250 FOR IP): Performed by: SURGERY

## 2022-07-26 PROCEDURE — 11042 DBRDMT SUBQ TIS 1ST 20SQCM/<: CPT

## 2022-07-26 PROCEDURE — 1036F TOBACCO NON-USER: CPT | Performed by: INTERNAL MEDICINE

## 2022-07-26 PROCEDURE — G8417 CALC BMI ABV UP PARAM F/U: HCPCS | Performed by: INTERNAL MEDICINE

## 2022-07-26 RX ORDER — LIDOCAINE HYDROCHLORIDE 40 MG/ML
SOLUTION TOPICAL ONCE
Status: COMPLETED | OUTPATIENT
Start: 2022-07-26 | End: 2022-07-26

## 2022-07-26 RX ORDER — LIDOCAINE 40 MG/G
CREAM TOPICAL ONCE
Status: CANCELLED | OUTPATIENT
Start: 2022-07-26 | End: 2022-07-26

## 2022-07-26 RX ORDER — BACITRACIN, NEOMYCIN, POLYMYXIN B 400; 3.5; 5 [USP'U]/G; MG/G; [USP'U]/G
OINTMENT TOPICAL ONCE
Status: CANCELLED | OUTPATIENT
Start: 2022-07-26 | End: 2022-07-26

## 2022-07-26 RX ORDER — LIDOCAINE HYDROCHLORIDE 40 MG/ML
SOLUTION TOPICAL ONCE
Status: CANCELLED | OUTPATIENT
Start: 2022-07-26 | End: 2022-07-26

## 2022-07-26 RX ORDER — CLOBETASOL PROPIONATE 0.5 MG/G
OINTMENT TOPICAL ONCE
Status: CANCELLED | OUTPATIENT
Start: 2022-07-26 | End: 2022-07-26

## 2022-07-26 RX ORDER — BETAMETHASONE DIPROPIONATE 0.05 %
OINTMENT (GRAM) TOPICAL ONCE
Status: CANCELLED | OUTPATIENT
Start: 2022-07-26 | End: 2022-07-26

## 2022-07-26 RX ORDER — LIDOCAINE 50 MG/G
OINTMENT TOPICAL ONCE
Status: CANCELLED | OUTPATIENT
Start: 2022-07-26 | End: 2022-07-26

## 2022-07-26 RX ORDER — LIDOCAINE HYDROCHLORIDE 20 MG/ML
JELLY TOPICAL ONCE
Status: CANCELLED | OUTPATIENT
Start: 2022-07-26 | End: 2022-07-26

## 2022-07-26 RX ORDER — BACITRACIN ZINC AND POLYMYXIN B SULFATE 500; 1000 [USP'U]/G; [USP'U]/G
OINTMENT TOPICAL ONCE
Status: CANCELLED | OUTPATIENT
Start: 2022-07-26 | End: 2022-07-26

## 2022-07-26 RX ORDER — GINSENG 100 MG
CAPSULE ORAL ONCE
Status: CANCELLED | OUTPATIENT
Start: 2022-07-26 | End: 2022-07-26

## 2022-07-26 RX ORDER — GENTAMICIN SULFATE 1 MG/G
OINTMENT TOPICAL ONCE
Status: CANCELLED | OUTPATIENT
Start: 2022-07-26 | End: 2022-07-26

## 2022-07-26 RX ADMIN — LIDOCAINE HYDROCHLORIDE 5 ML: 40 SOLUTION TOPICAL at 15:02

## 2022-07-26 NOTE — DISCHARGE INSTR - COC
Continuity of Care Form    Patient Name: Katya Gipson   :  1975  MRN:  [id number]    Admit date:  2022  Discharge date:  ***    Code Status Order: Prior   Advance Directives:     Admitting Physician:  No admitting provider for patient encounter. PCP: Nayely Castillo MD    Discharging Nurse: [facility] Unit/Room#: No information available for this encounter.   Discharging Unit Phone Number: ***    Emergency Contact:   Extended Emergency Contact Information  Primary Emergency Contact: Renée Jason  Address: [address] [place] [address], [address] [place] [address] Phone: [phone]  Relation: Spouse    Past Surgical History:  Past Surgical History:   Procedure Laterality Date    ABDOMEN SURGERY Left 2022    INCISION AND DRAINAGE LEFT BACK ABSCESS performed by Darryle Goo, MD at [address] [place] [address] leg & ankle fx    TONSILLECTOMY         Immunization History:   Immunization History   Administered Date(s) Administered    COVID-19, PFIZER PURPLE top, DILUTE for use, (age 15 y+), 30mcg/0.3mL 2021, 04/15/2021, 2021    Tdap (Boostrix, Adacel) 2012    Varicella (Varivax) 2013       Active Problems:  Patient Active Problem List   Diagnosis Code    Poorly controlled type 2 diabetes mellitus ([facility] [address] E11.65    HTN (hypertension) I10    Hyperlipidemia E78.5    Kidney stone N20.0    Morbid obesity due to excess calories (HCC) E66.01    Family history of prostate cancer in father Z80.41    Back abscess L02.212    Abscess of left shoulder L02.414    Cellulitis and abscess of trunk L03.319, L02.219    Cellulitis of back L03.312    Pulmonary nodule R91.1    Necrotizing soft tissue infection M79.89    Atrial fibrillation, rapid ([address] I48.91    Dietary noncompliance Z91.11    Vitamin D deficiency E55.9    Open wound of left side of back S21.A       Isolation/Infection:   Isolation            No Isolation Patient Infection Status       None to display            Nurse Assessment:  Last Vital Signs: /64   Pulse 66   Temp (!) 96 °F (35.6 °C) (Temporal)   Resp 18   Ht 5' 10\" (1.778 m)   Wt 263 lb (119.3 kg)   BMI 37.74 kg/m²     Last documented pain score (0-10 scale):    Last Weight:   Wt Readings from Last 1 Encounters:   07/26/22 263 lb (119.3 kg)     Mental Status:  {IP PT MENTAL STATUS:20030}    IV Access:  { ENRIQUE IV ACCESS:503174071}    Nursing Mobility/ADLs:  Walking   {P DME VGKE:652320043}  Transfer  {P DME GALP:260475532}  Bathing  {P DME WYBO:637533116}  Dressing  {CHP DME UVWN:362329169}  Toileting  {CHP DME CEVY:755954807}  Feeding  {P DME RQER:537113156}  Med Admin  {P DME OOOS:356939523}  Med Delivery   { ENRIQUE MED Delivery:406133664}    Wound Care Documentation and Therapy:  Wound 05/31/22 Shoulder Posterior; Left #1 Wound LEFT SHOULDER  (Active)   Wound Image   07/19/22 1436   Dressing Status New dressing applied 07/19/22 1504   Wound Cleansed Cleansed with saline 07/19/22 1504   Dressing/Treatment Hydrating gel;Silicone border 01/79/54 1504   Offloading for Diabetic Foot Ulcers Offloading not required 07/19/22 1504   Wound Length (cm) 1.2 cm 07/26/22 1501   Wound Width (cm) 0.3 cm 07/26/22 1501   Wound Depth (cm) 0.1 cm 07/26/22 1501   Wound Surface Area (cm^2) 0.36 cm^2 07/26/22 1501   Change in Wound Size % (l*w) 97.78 07/26/22 1501   Wound Volume (cm^3) 0.036 cm^3 07/26/22 1501   Wound Healing % 100 07/26/22 1501   Post-Procedure Length (cm) 1.3 cm 07/26/22 1546   Post-Procedure Width (cm) 0.4 cm 07/26/22 1546   Post-Procedure Depth (cm) 0.2 cm 07/26/22 1546   Post-Procedure Surface Area (cm^2) 0.52 cm^2 07/26/22 1546   Post-Procedure Volume (cm^3) 0.104 cm^3 07/26/22 1546   Wound Assessment Pink/red;Granulation tissue 07/26/22 1501   Drainage Amount None 07/26/22 1501   Drainage Description Yellow 07/19/22 1436   Odor None 07/26/22 1501   Soco-wound Assessment Intact 22 1501   Wound Thickness Description not for Pressure Injury Full thickness 22 1400   Number of days: 56        Elimination:  Continence: Bowel: {YES / UJ:40120}  Bladder: {YES / CJ:57846}  Urinary Catheter: {Urinary Catheter:905185159}   Colostomy/Ileostomy/Ileal Conduit: {YES / KZ:05937}       Date of Last BM: ***  No intake or output data in the 24 hours ending 22 1619  No intake/output data recorded.     Safety Concerns:     [employer] Safety Concerns:866356923}    Impairments/Disabilities:      [employer] Impairments/Disabilities:363328232}    Nutrition Therapy:  Current Nutrition Therapy:   [employer] Diet List:107252955}    Routes of Feeding: {CHP DME Other Feedings:672124322}  Liquids: {Slp liquid thickness:78384}  Daily Fluid Restriction: {CHP DME Yes amt example:479338588}  Last Modified Barium Swallow with Video (Video Swallowing Test): {Done Not Done XYHL:380807093}    Treatments at the Time of Hospital Discharge:   Respiratory Treatments: ***  Oxygen Therapy:  {Therapy; copd oxygen:81396}  Ventilator:    {MH CC Vent ZNJN:177747249}    Rehab Therapies: {THERAPEUTIC INTERVENTION:0024470148}  Weight Bearing Status/Restrictions: [address] [employer]  Weight Bearin}  Other Medical Equipment (for information only, NOT a DME order):  {EQUIPMENT:131203256}  Other Treatments: ***    Patient's personal belongings (please select all that are sent with patient):  {CHP DME Belongings:002752345}    RN SIGNATURE:  {Esignature:491290991}    CASE MANAGEMENT/SOCIAL WORK SECTION    Inpatient Status Date: ***    Readmission Risk Assessment Score:  Readmission Risk              Risk of Unplanned Readmission:  0           Discharging to Facility/ Agency   Name:   Address:  Phone:  Fax:    Dialysis Facility (if applicable)   Name:  Address:  Dialysis Schedule:  Phone:  Fax:    / signature: {Esignature:428098747}    PHYSICIAN SECTION    Prognosis: {Prognosis:7248003730}    Condition at Discharge: [address] Patient Condition:227639889}    Rehab Potential (if transferring to Rehab): {Prognosis:5722757437}    Recommended Labs or Other Treatments After Discharge: ***    Physician Certification: I certify the above information and transfer of Stacia Music  is necessary for the continuing treatment of the diagnosis listed and that he requires {Admit to Appropriate Level of Care:56533} for {GREATER/LESS:830883848} 30 days.      Update Admission H&P: {CHP DME Changes in [facility]:971717628}    PHYSICIAN SIGNATURE:  {Esignature:311362528}

## 2022-07-26 NOTE — PLAN OF CARE
Problem: Chronic Conditions and Co-morbidities  Goal: Patient's chronic conditions and co-morbidity symptoms are monitored and maintained or improved  Outcome: Progressing Towards Goal     Problem: Wound:  Goal: Will show signs of wound healing; wound closure and no evidence of infection  Description: Will show signs of wound healing; wound closure and no evidence of infection  Outcome: Progressing Towards Goal

## 2022-07-26 NOTE — PROGRESS NOTES
OUTPATIENT CARDIOLOGY FOLLOW-UP    Name: Valerie Carey    Age: 42 y.o. Primary Care Physician: Lucius Guillen MD    Date of Service: 7/26/2022    Chief Complaint:   Chief Complaint   Patient presents with    Follow-Up from Hospital       Interim History:   Mr. Robbie Abarca is a 74-year-old  male with a history of type 2 diabetes on insulin, hypertension, hyperlipidemia, renal stones presented to the emergency room on 5/27/2022 with complaints of abscess in his left posterior chest.  He noticed a lesion on his left upper for about 3 days and he went to the urgent care center where an I&D was was performed and was referred to his primary care provider for follow-up. He presented to the emergency room on 5/27/2022 with swelling and and redness. Patient was initiated on IV antibiotics and admitted for further evaluation. Patient underwent I&D and wound debridement on 5/29/2022. Postoperatively, patient went into A. fib with RVR. He never had any atrial fibrillation. He was seen as a new consult on 5/30/2022 and was diagnosed with new onset A. fib. He was initiated on metoprolol for rate control and Eliquis for anticoagulation. Echocardiogram was obtained which showed normal LV function. He is here for follow-up visit. No further hospitalizations or ER visits since he was discharged from the hospital.   He is compliant with medications, as well as salt and fluid intake. He does not take any over-the-counter arthritis medications. No new cardiac complaints since last cardiology evaluation. He denies recent chest pain, SOB, palpitations, lightheadedness, dizziness, syncope, PND, or orthopnea. SR on EKG.     Review of Systems:   Cardiac: As per HPI  General: No fever, chills  Pulmonary: As per HPI  HEENT: No visual disturbances, difficult swallowing  GI: No nausea, vomiting  Endocrine: No thyroid disease or DM  Musculoskeletal: CLAIRE x 4, no focal motor deficits  Skin: Intact, no rashes  Neuro/Psych: No headache or seizures    Past Medical History:  Past Medical History:   Diagnosis Date    Hyperlipidemia     Hypertension     Kidney stones     Multiple times    Type II or unspecified type diabetes mellitus without mention of complication, not stated as uncontrolled        Past Surgical History:  Past Surgical History:   Procedure Laterality Date    ABDOMEN SURGERY Left 5/29/2022    INCISION AND DRAINAGE LEFT BACK ABSCESS performed by Jorge Medley MD at [address] [facility] [address] leg & ankle fx    TONSILLECTOMY  2000       Family History:  Family History   Problem Relation Age of Onset    Diabetes Mother     High Blood Pressure Mother     Cancer Father         prostate    Heart Disease Father         MI @ 52yrs old/ also 1 stent       Social History:  Social History     Socioeconomic History    Marital status:      Spouse name: Not on file    Number of children: Not on file    Years of education: Not on file    Highest education level: Not on file   Occupational History    Not on file   Tobacco Use    Smoking status: Never    Smokeless tobacco: Never   Vaping Use    Vaping Use: Never used   Substance and Sexual Activity    Alcohol use: No     Comment: soc    Drug use: No    Sexual activity: Not on file   Other Topics Concern    Not on file   Social History Narrative    Not on file     Social Determinants of Health     Financial Resource Strain: Low Risk     Difficulty of Paying Living Expenses: Not hard at all   Food Insecurity: No Food Insecurity    Worried About Running Out of Food in the Last Year: Never true    Ran Out of Food in the Last Year: Never true   Transportation Needs: Not on file   Physical Activity: Not on file   Stress: Not on file   Social Connections: Not on file   Intimate Partner Violence: Not on file   Housing Stability: Not on file       Allergies:   Allergies   Allergen Reactions    Sulfa Antibiotics Hives       Current Medications:  Current Outpatient Medications   Medication Sig Dispense Refill    Continuous Blood Gluc  (DEXCOM G6 ) MIKY Dx: DM2 1 each 0    apixaban (ELIQUIS) 5 MG TABS tablet Take 1 tablet by mouth in the morning and 1 tablet before bedtime. 180 tablet 3    metoprolol tartrate (LOPRESSOR) 25 MG tablet Take 1 tablet by mouth in the morning and 1 tablet before bedtime. 180 tablet 3    Continuous Blood Gluc Sensor (DEXCOM G6 SENSOR) MISC Apply every two weeks 6 each 3    Continuous Blood Gluc Transmit (DEXCOM G6 TRANSMITTER) MISC Dx: DM2 1 each 0    Continuous Blood Gluc Sensor (FREESTYLE WAGNER 2 SENSOR) MISC Change sensor every 14 days 6 each 3    allopurinol (ZYLOPRIM) 300 MG tablet TAKE 1 TABLET BY MOUTH EVERY DAY 90 tablet 3    metFORMIN (GLUCOPHAGE) 1000 MG tablet 1,000 mg 2 times daily (with meals)       Insulin Pen Needle 32G X 4 MM MISC 1 each by Does not apply route daily 100 each 0    insulin detemir (LEVEMIR FLEXTOUCH) 100 UNIT/ML injection pen Inject 45 units daily in the morning (Patient taking differently: 50 Units Inject 50 units daily in the morning) 15 pen 3    insulin aspart (NOVOLOG FLEXPEN) 100 UNIT/ML injection pen Inject 15 units with meals + sliding scale.  -200 add 3U, -250 add 6U, -300 add 9U, -350 add 12U, -400 add 15U, BS over 400 add 18U 15 pen 3    lisinopril (PRINIVIL;ZESTRIL) 20 MG tablet TAKE 1 TABLET BY MOUTH EVERY DAY 90 tablet 3    escitalopram (LEXAPRO) 10 MG tablet TAKE 1 TABLET BY MOUTH EVERY DAY 90 tablet 1    rosuvastatin (CRESTOR) 20 MG tablet TAKE 1 TABLET BY MOUTH EVERYDAY AT BEDTIME 30 tablet 5    FARXIGA 10 MG tablet TAKE 1 TABLET BY MOUTH EVERY DAY IN THE MORNING 90 tablet 3    omega-3 acid ethyl esters (LOVAZA) 1 g capsule TAKE 2 CAPSULES BY MOUTH TWICE A  capsule 3    Glucose Blood (BLOOD GLUCOSE TEST STRIPS) STRP Test four times a day 360 strip 3    BD ULTRA-FINE PEN NEEDLES 29G X 12.7MM MISC USE 5 TIMES DAILY OR AS DIRECTED 500 each 3    Blood Glucose Monitoring Suppl (ONE TOUCH II TEST STRIP RESENDEZ) by Does not apply route. No current facility-administered medications for this visit. Physical Exam:  /82   Pulse (!) 101   Resp 16   Ht 5' 10\" (1.778 m)   Wt 263 lb 3.2 oz (119.4 kg)   BMI 37.77 kg/m²   Wt Readings from Last 3 Encounters:   07/26/22 263 lb 3.2 oz (119.4 kg)   07/19/22 265 lb (120.2 kg)   07/15/22 265 lb 6.4 oz (120.4 kg)     Appearance: Awake, alert and oriented x 3, no acute respiratory distress  Skin: Intact, no rash  Head: Normocephalic, atraumatic  Eyes: EOMI, no conjunctival erythema  ENMT: No pharyngeal erythema, MMM, no rhinorrhea  Neck: Supple, no elevated JVP, no carotid bruits  Lungs: Clear to auscultation bilaterally. No wheezes, rales, or rhonchi.   Cardiac: Regular rate and rhythm, tachycardic +S1S2, no murmurs apparent  Abdomen: Soft, nontender, +bowel sounds  Extremities: Moves all extremities x 4, no lower extremity edema  Neurologic: No focal motor deficits apparent, normal mood and affect, alert and oriented x 3  Peripheral Pulses: Intact posterior tibial pulses bilaterally    Laboratory Tests:  Lab Results   Component Value Date    CREATININE 0.6 (L) 06/01/2022    BUN 5 (L) 06/01/2022     06/01/2022    K 4.1 06/01/2022     06/01/2022    CO2 29 06/01/2022     Lab Results   Component Value Date/Time    MG 2.1 05/29/2022 03:27 AM     Lab Results   Component Value Date    WBC 9.1 06/01/2022    HGB 12.6 06/01/2022    HCT 39.2 06/01/2022    MCV 89.3 06/01/2022     (H) 06/01/2022     Lab Results   Component Value Date    ALT 14 05/27/2022    AST 10 05/27/2022    ALKPHOS 154 (H) 05/27/2022    BILITOT 0.7 05/27/2022     No results found for: CKTOTAL, CKMB, CKMBINDEX, TROPONINI  No results found for: INR, PROTIME  Lab Results   Component Value Date    TSH 4.470 (H) 05/30/2022     Lab Results   Component Value Date    LABA1C 12.7 (H) 05/27/2022     No results found for: EAG  Lab Results   Component Value Date    CHOL 287 (H) 04/19/2022    CHOL 172 09/02/2020    CHOL 213 (H) 06/03/2020     Lab Results   Component Value Date    TRIG 392 (H) 04/19/2022    TRIG 305 (H) 09/02/2020    TRIG 314 (H) 06/03/2020     Lab Results   Component Value Date    HDL 47 04/19/2022    HDL 44 09/02/2020    HDL 42 06/03/2020     Lab Results   Component Value Date    LDLCALC 162 (H) 04/19/2022    LDLCALC 67 09/02/2020    LDLCALC 108 (H) 06/03/2020     Lab Results   Component Value Date    LABVLDL 78 04/19/2022    LABVLDL 61 09/02/2020    LABVLDL 63 06/03/2020     Lab Results   Component Value Date    CHOLHDLRATIO 4.2 02/15/2014       Cardiac Tests:  ECG: Sinus tachycardia with heart rate in the 100s, normal normal EKG. Echocardiogram-6/1/2022:    Mild concentric left ventricular hypertrophy. Ejection fraction is visually estimated at 55 to 60%. Normal right ventricular size and function. Mitral valve was not well visualized. No significant aortic stenosis.   Trace TR      Stress test: N/A      Cardiac catheterization:     The 10-year ASCVD risk score (Eliane Quiroga, et al., 2013) is: 7.1%    Values used to calculate the score:      Age: 42 years      Sex: Male      Is Non- : No      Diabetic: Yes      Tobacco smoker: No      Systolic Blood Pressure: 145 mmHg      Is BP treated: Yes      HDL Cholesterol: 47 mg/dL      Total Cholesterol: 287 mg/dL        ASSESSMENT:  New onset A. fib with RVR>>NSR (spontaneously converted) and remains in sinus rhythm  [id number] Vasc score 2, on Eliquis for anticoagulation  Right upper chest abscess with cellulitis status post I&D on IV antibiotics>> off Abx, healing well  Type 2 diabetes on insulin  Hypertension, stable  Hyperlipidemia on statin  History of renal stones    Plan:   Continue metoprolol 25 mg p.o. twice daily for rate control and Eliquis 5 mg p.o. twice daily for anticoagulation  Continue lisinopril and Crestor for hypertension and hyperlipidemia  Management of diabetes as per primary service  He was on vies to discontinue aspirin. Sleep study as an outpatient from his primary care provider's office. Follow-up with me in 3 months. The patient's current medication list, allergies, problem list and results of all previously ordered testing were reviewed at today's visit.   Mingo Lesch, MD  [facility]) Cardiology

## 2022-07-26 NOTE — PROGRESS NOTES
Wound Care Center Follow-Up Progress Note    Yemi Levy  AGE: 42 y.o. GENDER: male  : 1975    TODAY'S DATE:  2022    Subjective:    Yemi Levy is a 42 y.o. male who presents today for follow-up examination and treatment of a delayed-healing wound(s). The patient denies any problems since last visit. Objective:    /64   Pulse 66   Temp (!) 96 °F (35.6 °C) (Temporal)   Resp 18   Ht 5' 10\" (1.778 m)   Wt 263 lb (119.3 kg)   BMI 37.74 kg/m²     (Wound Reference Date is when first assessed. Measurements shown are from today's visit.)    Wound 22 Shoulder Posterior; Left #1 Wound LEFT SHOULDER  (Active)   Wound Image   22 1436   Dressing Status New dressing applied 22 1504   Wound Cleansed Cleansed with saline 22 1504   Dressing/Treatment Hydrating gel;Silicone border  1504   Offloading for Diabetic Foot Ulcers Offloading not required 22 1504   Wound Length (cm) 1.2 cm 22 1501   Wound Width (cm) 0.3 cm 22 1501   Wound Depth (cm) 0.1 cm 22 1501   Wound Surface Area (cm^2) 0.36 cm^2 22 1501   Change in Wound Size % (l*w) 97.78 22 1501   Wound Volume (cm^3) 0.036 cm^3 22 1501   Wound Healing % 100 22 1501   Post-Procedure Length (cm) 1.3 cm 22 1546   Post-Procedure Width (cm) 0.4 cm 22 1546   Post-Procedure Depth (cm) 0.2 cm 22 1546   Post-Procedure Surface Area (cm^2) 0.52 cm^2 22 1546   Post-Procedure Volume (cm^3) 0.104 cm^3 22 1546   Wound Assessment Pink/red;Granulation tissue 22 1501   Drainage Amount None 22 1501   Drainage Description Yellow 22 1436   Odor None 22 1501   Soco-wound Assessment Intact 22 1501   Wound Thickness Description not for Pressure Injury Full thickness 22 1400   Number of days: 55        Other physical exam findings:        Assessment:     Patient Active Problem List   Diagnosis Code    Poorly controlled type 2 diabetes mellitus (HCC) E11.65    HTN (hypertension) I10    Hyperlipidemia E78.5    Kidney stone N20.0    Morbid obesity due to excess calories (HCC) E66.01    Family history of prostate cancer in father Z80.41    Back abscess L02.212    Abscess of left shoulder L02.414    Cellulitis and abscess of trunk L03.319, L02.219    Cellulitis of back L03.312    Pulmonary nodule R91.1    Necrotizing soft tissue infection M79.89    Atrial fibrillation, rapid (HCC) I48.91    Dietary noncompliance Z91.11    Vitamin D deficiency E55.9    Open wound of left side of back S21.202A       Overall Wound Assessment  Wound has improved. Size has decreased. Appearance has improved    (Please refer to nursing measurements and assessment regarding wound measurements.) Wound check - Care provided includes removal of existing dressing and visual inspection. Plan:       1. 100%Debridement today. See Procedure Note below. 2. Discussed appropriate home care of this wound. Dispensed dressing supplies and instructions on their use. Wound redressed. 3. Patient instructions were given. Dressing: santyl Offloading. 4. Follow up: 2 week. Jennifer Martin. Main Procedure Note    Yonny Abler  AGE: 42 y.o. GENDER: male  : 1975    TODAY'S DATE:  2022    The patient was identified and the procedure was confirmed. Lidocaine gel soaked gauze was applied at beginning of wound evaluation. The left upper back wound was excisionally debrided sharply of fibrotic, necrotic, and hyperkeratotic tissue, including a layer of surrounding healthy tissue using curette for a total surface area of < 20 cm2. The wound(s) was debrided down through and including full thickness tissue. 100% of the wound was debrided. There was minimal bleeding that was controlled with pressure. Patient tolerated procedure well and was given proper instruction.       Shruti Mccauley MD

## 2022-07-26 NOTE — PATIENT INSTRUCTIONS
Continue metoprolol 25 mg p.o. twice daily for rate control and Eliquis 5 mg p.o. twice daily for anticoagulation  Continue lisinopril and Crestor for hypertension and hyperlipidemia  Management of diabetes as per primary service  He was on vies to discontinue aspirin. Patient was advised to get a sleep study from his primary care provider's office. Follow-up with me in 3 months.

## 2022-08-04 NOTE — DISCHARGE INSTRUCTIONS
Visit Discharge/Physician Orders     Discharge condition: Stable  Assessment of pain at discharge:minimal  Anesthetic used: 4% lidocaine  Discharge to: Home  Left via:Private automobile  Accompanied by: accompanied by self  ECF/HHA:     Dressing Orders: healed     Treatment Orders:  Eat foods high in protein and vitamin c     Multivitamin daily     [facility] followup visit _____________healed________________  (Please note your next appointment above and if you are unable to keep, kindly give a 24 hour notice. Thank you.)     Physician signature:__________________________        If you experience any of the following, please call the [facility] during business hours:     * Increase in Pain  * Temperature over 101  * Increase in drainage from your wound  * Drainage with a foul odor  * Bleeding  * Increase in swelling  * Need for compression bandage changes due to slippage, breakthrough drainage. If you need medical attention outside of the business hours of the [facility] please contact your PCP or go to the nearest emergency room.

## 2022-08-09 ENCOUNTER — HOSPITAL ENCOUNTER (OUTPATIENT)
Dept: WOUND CARE | Age: 47
Discharge: HOME | End: 2022-08-09
Payer: COMMERCIAL

## 2022-08-09 VITALS
TEMPERATURE: 97.6 F | HEIGHT: 70 IN | HEART RATE: 104 BPM | BODY MASS INDEX: 37.65 KG/M2 | RESPIRATION RATE: 18 BRPM | SYSTOLIC BLOOD PRESSURE: 128 MMHG | DIASTOLIC BLOOD PRESSURE: 74 MMHG | WEIGHT: 263 LBS

## 2022-08-09 DIAGNOSIS — M79.89 NECROTIZING SOFT TISSUE INFECTION: Primary | ICD-10-CM

## 2022-08-09 PROCEDURE — 99213 OFFICE O/P EST LOW 20 MIN: CPT | Performed by: SURGERY

## 2022-08-09 PROCEDURE — 99212 OFFICE O/P EST SF 10 MIN: CPT

## 2022-08-09 NOTE — PROGRESS NOTES
Patient's Name/Date of Birth: Radha Lee / 1975    Date: 8/9/2022    PCP: Jeffery Zamudio MD    Chief Complaint   Patient presents with    Wound Check     back       HPI:  FU necrotizing soft tissue infection left shoulder with open wound. Patient states wound all healed, no pain     Patient's medications, allergies, past medical, surgical, social and family histories were reviewed and updated as appropriate. Allergies   Allergen Reactions    Sulfa Antibiotics Hives       Past Medical History:   Diagnosis Date    Hyperlipidemia     Hypertension     Kidney stones     Multiple times    Type II or unspecified type diabetes mellitus without mention of complication, not stated as uncontrolled         Past Surgical History:   Procedure Laterality Date    ABDOMEN SURGERY Left 5/29/2022    INCISION AND DRAINAGE LEFT BACK ABSCESS performed by Vania Portillo MD at [address] [place] [address] St leg & ankle fx    TONSILLECTOMY  2000        Social History     Tobacco Use    Smoking status: Never    Smokeless tobacco: Never   Substance Use Topics    Alcohol use: No     Comment: soc       Current Outpatient Medications   Medication Sig Dispense Refill    Continuous Blood Gluc  (DEXCOM G6 ) MIKY Dx: DM2 1 each 0    apixaban (ELIQUIS) 5 MG TABS tablet Take 1 tablet by mouth in the morning and 1 tablet before bedtime. 180 tablet 3    metoprolol tartrate (LOPRESSOR) 25 MG tablet Take 1 tablet by mouth in the morning and 1 tablet before bedtime.  180 tablet 3    Continuous Blood Gluc Sensor (DEXCOM G6 SENSOR) MISC Apply every two weeks 6 each 3    Continuous Blood Gluc Transmit (DEXCOM G6 TRANSMITTER) MISC Dx: DM2 1 each 0    Continuous Blood Gluc Sensor (FREESTYLE WAGNER 2 SENSOR) MISC Change sensor every 14 days 6 each 3    allopurinol (ZYLOPRIM) 300 MG tablet TAKE 1 TABLET BY MOUTH EVERY DAY 90 tablet 3    metFORMIN (GLUCOPHAGE) 1000 MG tablet 1,000 mg 2 times daily (with meals)       Insulin Pen Needle 32G X 4 MM MISC 1 each by Does not apply route daily 100 each 0    insulin detemir (LEVEMIR FLEXTOUCH) 100 UNIT/ML injection pen Inject 45 units daily in the morning (Patient taking differently: 50 Units Inject 50 units daily in the morning) 15 pen 3    insulin aspart (NOVOLOG FLEXPEN) 100 UNIT/ML injection pen Inject 15 units with meals + sliding scale. -200 add 3U, -250 add 6U, -300 add 9U, -350 add 12U, -400 add 15U, BS over 400 add 18U 15 pen 3    lisinopril (PRINIVIL;ZESTRIL) 20 MG tablet TAKE 1 TABLET BY MOUTH EVERY DAY 90 tablet 3    escitalopram (LEXAPRO) 10 MG tablet TAKE 1 TABLET BY MOUTH EVERY DAY 90 tablet 1    rosuvastatin (CRESTOR) 20 MG tablet TAKE 1 TABLET BY MOUTH EVERYDAY AT BEDTIME 30 tablet 5    FARXIGA 10 MG tablet TAKE 1 TABLET BY MOUTH EVERY DAY IN THE MORNING 90 tablet 3    omega-3 acid ethyl esters (LOVAZA) 1 g capsule TAKE 2 CAPSULES BY MOUTH TWICE A  capsule 3    Glucose Blood (BLOOD GLUCOSE TEST STRIPS) STRP Test four times a day 360 strip 3    BD ULTRA-FINE PEN NEEDLES 29G X 12.7MM MISC USE 5 TIMES DAILY OR AS DIRECTED 500 each 3    Blood Glucose Monitoring Suppl (ONE TOUCH II TEST STRIP RESENDEZ) by Does not apply route. No current facility-administered medications for this encounter.          Labs:      Review of Systems  Constitutional: negative  Eyes: negative  Ears, nose, mouth, throat, and face: negative  Respiratory: negative  Cardiovascular: negative  Gastrointestinal: negative  Genitourinary:negative  Integument/breast: negative  Hematologic/lymphatic: negative  Musculoskeletal:negative  Neurological: negative  Allergic/Immunologic: negative    Physical exam:  /74   Pulse (!) 104   Temp 97.6 °F (36.4 °C) (Temporal)   Resp 18   Ht 5' 10\" (1.778 m)   Wt 263 lb (119.3 kg)   BMI 37.74 kg/m²   General appearance: no acute distress  Head:NCAT, EOMI, PERRLA, conjunctiva pink  Neck: no masses, supple  Lungs: CTABL  Heart: RRR  Abdomen: soft, nondistended, nontender, no guarding, no peritoneal signs, normoactive bowel sounds  Extremities:no edema    Assessment/Plan:  . Wound Care Center Follow-Up Progress Note    Stacia Armstrong  AGE: 42 y.o. GENDER: male  : 1975    TODAY'S DATE:  2022    Subjective:    Stacia Armstrong is a 42 y.o. male who presents today for follow-up examination and treatment of a delayed-healing wound(s). The patient denies any problems since last visit. Objective: The wound has healed. The previous open area is now closed with new skin. Assessment:     Patient Active Problem List   Diagnosis Code    Poorly controlled type 2 diabetes mellitus (HCC) E11.65    HTN (hypertension) I10    Hyperlipidemia E78.5    Kidney stone N20.0    Morbid obesity due to excess calories (HCC) E66.01    Family history of prostate cancer in father Z80.41    Back abscess L02.212    Abscess of left shoulder L02.414    Cellulitis and abscess of trunk L03.319, L02.219    Cellulitis of back L03.312    Pulmonary nodule R91.1    Necrotizing soft tissue infection M79.89    Atrial fibrillation, rapid (HCC) I48.91    Dietary noncompliance Z91.11    Vitamin D deficiency E55.9    Open wound of left side of back S21.202A       Healed left shoulder  wound. Plan:       1. The patient was instructed on caring for this healed would. 2. Discussed with patient to call us with any problems. 3. Discharge from [address] [place] [address], MD     No follow-ups on file.     Andrew Barbour MD      Send copy of H&P to PCP, Ezra Maria MD

## 2022-09-12 RX ORDER — DAPAGLIFLOZIN 10 MG/1
TABLET, FILM COATED ORAL
Qty: 90 TABLET | Refills: 3 | Status: SHIPPED | OUTPATIENT
Start: 2022-09-12

## 2022-10-03 ENCOUNTER — OFFICE VISIT (OUTPATIENT)
Dept: ENDOCRINOLOGY | Age: 47
End: 2022-10-03
Payer: COMMERCIAL

## 2022-10-03 VITALS
WEIGHT: 264 LBS | BODY MASS INDEX: 37.8 KG/M2 | HEART RATE: 88 BPM | DIASTOLIC BLOOD PRESSURE: 78 MMHG | SYSTOLIC BLOOD PRESSURE: 132 MMHG | HEIGHT: 70 IN | OXYGEN SATURATION: 98 %

## 2022-10-03 DIAGNOSIS — Z79.4 TYPE 2 DIABETES MELLITUS WITH HYPERGLYCEMIA, WITH LONG-TERM CURRENT USE OF INSULIN (HCC): Primary | ICD-10-CM

## 2022-10-03 DIAGNOSIS — E11.65 TYPE 2 DIABETES MELLITUS WITH HYPERGLYCEMIA, WITH LONG-TERM CURRENT USE OF INSULIN (HCC): ICD-10-CM

## 2022-10-03 DIAGNOSIS — Z91.119 DIETARY NONCOMPLIANCE: ICD-10-CM

## 2022-10-03 DIAGNOSIS — E11.65 TYPE 2 DIABETES MELLITUS WITH HYPERGLYCEMIA, WITH LONG-TERM CURRENT USE OF INSULIN (HCC): Primary | ICD-10-CM

## 2022-10-03 DIAGNOSIS — E55.9 VITAMIN D DEFICIENCY: ICD-10-CM

## 2022-10-03 DIAGNOSIS — E66.09 CLASS 2 OBESITY DUE TO EXCESS CALORIES WITHOUT SERIOUS COMORBIDITY WITH BODY MASS INDEX (BMI) OF 37.0 TO 37.9 IN ADULT: ICD-10-CM

## 2022-10-03 DIAGNOSIS — Z79.4 TYPE 2 DIABETES MELLITUS WITH HYPERGLYCEMIA, WITH LONG-TERM CURRENT USE OF INSULIN (HCC): ICD-10-CM

## 2022-10-03 LAB
ALBUMIN SERPL-MCNC: 4.9 G/DL (ref 3.5–5.2)
ALP BLD-CCNC: 88 U/L (ref 40–129)
ALT SERPL-CCNC: 16 U/L (ref 0–40)
ANION GAP SERPL CALCULATED.3IONS-SCNC: 25 MMOL/L (ref 7–16)
AST SERPL-CCNC: 12 U/L (ref 0–39)
BILIRUB SERPL-MCNC: 0.4 MG/DL (ref 0–1.2)
BUN BLDV-MCNC: 16 MG/DL (ref 6–20)
CALCIUM SERPL-MCNC: 10.1 MG/DL (ref 8.6–10.2)
CHLORIDE BLD-SCNC: 99 MMOL/L (ref 98–107)
CHOLESTEROL, TOTAL: 221 MG/DL (ref 0–199)
CO2: 19 MMOL/L (ref 22–29)
CREAT SERPL-MCNC: 0.7 MG/DL (ref 0.7–1.2)
CREATININE URINE: 43 MG/DL (ref 40–278)
GFR AFRICAN AMERICAN: >60
GFR NON-AFRICAN AMERICAN: >60 ML/MIN/1.73
GLUCOSE BLD-MCNC: 347 MG/DL (ref 74–99)
HBA1C MFR BLD: 11.7 %
HDLC SERPL-MCNC: 44 MG/DL
LDL CHOLESTEROL CALCULATED: ABNORMAL MG/DL (ref 0–99)
MICROALBUMIN UR-MCNC: <12 MG/L
MICROALBUMIN/CREAT UR-RTO: ABNORMAL (ref 0–30)
POTASSIUM SERPL-SCNC: 4.7 MMOL/L (ref 3.5–5)
SODIUM BLD-SCNC: 143 MMOL/L (ref 132–146)
TOTAL PROTEIN: 8.5 G/DL (ref 6.4–8.3)
TRIGL SERPL-MCNC: 421 MG/DL (ref 0–149)
TSH SERPL DL<=0.05 MIU/L-ACNC: 3.43 UIU/ML (ref 0.27–4.2)
VITAMIN D 25-HYDROXY: 19 NG/ML (ref 30–100)
VLDLC SERPL CALC-MCNC: ABNORMAL MG/DL

## 2022-10-03 PROCEDURE — 1036F TOBACCO NON-USER: CPT | Performed by: CLINICAL NURSE SPECIALIST

## 2022-10-03 PROCEDURE — 83036 HEMOGLOBIN GLYCOSYLATED A1C: CPT | Performed by: CLINICAL NURSE SPECIALIST

## 2022-10-03 PROCEDURE — G8427 DOCREV CUR MEDS BY ELIG CLIN: HCPCS | Performed by: CLINICAL NURSE SPECIALIST

## 2022-10-03 PROCEDURE — 99214 OFFICE O/P EST MOD 30 MIN: CPT | Performed by: CLINICAL NURSE SPECIALIST

## 2022-10-03 PROCEDURE — G8484 FLU IMMUNIZE NO ADMIN: HCPCS | Performed by: CLINICAL NURSE SPECIALIST

## 2022-10-03 PROCEDURE — 3046F HEMOGLOBIN A1C LEVEL >9.0%: CPT | Performed by: CLINICAL NURSE SPECIALIST

## 2022-10-03 PROCEDURE — 2022F DILAT RTA XM EVC RTNOPTHY: CPT | Performed by: CLINICAL NURSE SPECIALIST

## 2022-10-03 PROCEDURE — G8417 CALC BMI ABV UP PARAM F/U: HCPCS | Performed by: CLINICAL NURSE SPECIALIST

## 2022-10-03 RX ORDER — BLOOD-GLUCOSE SENSOR
EACH MISCELLANEOUS
Qty: 9 EACH | Refills: 3 | Status: SHIPPED | OUTPATIENT
Start: 2022-10-03

## 2022-10-03 RX ORDER — BLOOD-GLUCOSE TRANSMITTER
EACH MISCELLANEOUS
Qty: 1 EACH | Refills: 1 | Status: SHIPPED | OUTPATIENT
Start: 2022-10-03

## 2022-10-03 RX ORDER — INSULIN DETEMIR 100 [IU]/ML
60 INJECTION, SOLUTION SUBCUTANEOUS EVERY MORNING
Qty: 15 ADJUSTABLE DOSE PRE-FILLED PEN SYRINGE | Refills: 3
Start: 2022-10-03

## 2022-10-03 RX ORDER — TIRZEPATIDE 5 MG/.5ML
5 INJECTION, SOLUTION SUBCUTANEOUS WEEKLY
Qty: 12 ADJUSTABLE DOSE PRE-FILLED PEN SYRINGE | Refills: 3 | Status: SHIPPED | OUTPATIENT
Start: 2022-10-03

## 2022-10-03 RX ORDER — INSULIN ASPART 100 [IU]/ML
INJECTION, SOLUTION INTRAVENOUS; SUBCUTANEOUS
Qty: 15 ADJUSTABLE DOSE PRE-FILLED PEN SYRINGE | Refills: 3
Start: 2022-10-03

## 2022-10-03 NOTE — PROGRESS NOTES
[address] [place] [address] Department of Endocrinology Diabetes and Metabolism   [address] [facility] [address]   Phone: [phone]  Fax: [phone]      Date of service: 10/3/2022  Primary Care Physician: Park Easley MD  Consultant physician: Karen Barrera APRN - CNS      Reason for the consultation:  Uncontrolled DM    History of Present Illness: The history is provided by the patient. Accuracy of the patient data is excellent    Gricelkatelyn Piper is a very pleasant 41 y.o. old male with PMH obesity, CHRYSTAL skin infection, poorly controlled DM type 2 seen today for follow up visit after recent hospitalizatin for infected diabetic foot   The patient was diagnosed with type 2 DM long time ago. Currently on Levemir 50u daily, Huamlog 15u with meals + ss 3:50>150. Farxiga 10 mg daily  Patient has had no hypoglycemic episodes. Checks BG infrequently now   Previous nancy BG not well controlled. Average    Had nancy but insurance prefers dexcom per pt   Diet improved. No regular pop    In addition, patient denied macrovascular or microvascular complications. Has been up to date with yearly diabetic eye exam.   Lab Results   Component Value Date/Time    LABA1C 11.7 10/03/2022 07:53 AM       Inpatient diet:   Carb Restricted diet     Point of care glucose monitoring   (Independently reviewed)   No results for input(s): GLUMET in the last 72 hours.     Past medical history:   Past Medical History:   Diagnosis Date    Hyperlipidemia     Hypertension     Kidney stones     Multiple times    Type II or unspecified type diabetes mellitus without mention of complication, not stated as uncontrolled        Past surgical history:  Past Surgical History:   Procedure Laterality Date    ABDOMEN SURGERY Left 5/29/2022    INCISION AND DRAINAGE LEFT BACK ABSCESS performed by Griffin Tijerina MD at [address] [place] [address] leg & ankle fx    TONSILLECTOMY  2000 Social history:   Tobacco:   reports that he has never smoked. He has never used smokeless tobacco.  Alcohol:   reports no history of alcohol use. Drugs:   reports no history of drug use. Family history:    Family History   Problem Relation Age of Onset    Diabetes Mother     High Blood Pressure Mother     Cancer Father         prostate    Heart Disease Father         MI @ 52yrs old/ also 1 stent       Allergy and drug reactions: Allergies   Allergen Reactions    Sulfa Antibiotics Hives     Current Outpatient Medications   Medication Sig Dispense Refill    Continuous Blood Gluc Transmit (DEXCOM G6 TRANSMITTER) MISC Dx: DM2 1 each 1    Continuous Blood Gluc Sensor (DEXCOM G6 SENSOR) MISC Every 10 days 9 each 3    insulin aspart (NOVOLOG FLEXPEN) 100 UNIT/ML injection pen Inject 20 units with meals + sliding scale. -200 add 3U, -250 add 6U, -300 add 9U, -350 add 12U, -400 add 15U, BS over 400 add 18U 15 Adjustable Dose Pre-filled Pen Syringe 3    insulin detemir (LEVEMIR FLEXTOUCH) 100 UNIT/ML injection pen Inject 60 Units into the skin every morning Inject 50 units daily in the morning 15 Adjustable Dose Pre-filled Pen Syringe 3    Tirzepatide (MOUNJARO) 5 MG/0.5ML SOPN SC injection Inject 0.5 mLs into the skin once a week 12 Adjustable Dose Pre-filled Pen Syringe 3    FARXIGA 10 MG tablet TAKE 1 TABLET BY MOUTH EVERY DAY IN THE MORNING 90 tablet 3    Continuous Blood Gluc Sensor (FREESTYLE WAGNER 2 SENSOR) MISC Change sensor every 14 days 6 each 3    metFORMIN (GLUCOPHAGE) 1000 MG tablet 1,000 mg 2 times daily (with meals)       Continuous Blood Gluc  (DEXCOM G6 ) MIKY Dx: DM2 1 each 0    apixaban (ELIQUIS) 5 MG TABS tablet Take 1 tablet by mouth in the morning and 1 tablet before bedtime. 180 tablet 3    metoprolol tartrate (LOPRESSOR) 25 MG tablet Take 1 tablet by mouth in the morning and 1 tablet before bedtime.  180 tablet 3    allopurinol (ZYLOPRIM) 300 MG tablet TAKE 1 TABLET BY MOUTH EVERY DAY 90 tablet 3    Insulin Pen Needle 32G X 4 MM MISC 1 each by Does not apply route daily 100 each 0    lisinopril (PRINIVIL;ZESTRIL) 20 MG tablet TAKE 1 TABLET BY MOUTH EVERY DAY 90 tablet 3    escitalopram (LEXAPRO) 10 MG tablet TAKE 1 TABLET BY MOUTH EVERY DAY 90 tablet 1    rosuvastatin (CRESTOR) 20 MG tablet TAKE 1 TABLET BY MOUTH EVERYDAY AT BEDTIME 30 tablet 5    omega-3 acid ethyl esters (LOVAZA) 1 g capsule TAKE 2 CAPSULES BY MOUTH TWICE A  capsule 3    Glucose Blood (BLOOD GLUCOSE TEST STRIPS) STRP Test four times a day 360 strip 3    BD ULTRA-FINE PEN NEEDLES 29G X 12.7MM MISC USE 5 TIMES DAILY OR AS DIRECTED 500 each 3    Blood Glucose Monitoring Suppl (ONE TOUCH II TEST STRIP RESENDEZ) by Does not apply route. No current facility-administered medications for this visit. Review of Systems  All systems reviewed. All negative except for symptoms mentioned in HPI     OBJECTIVE    /78   Pulse 88   Ht 5' 10\" (1.778 m)   Wt 264 lb (119.7 kg)   SpO2 98%   BMI 37.88 kg/m²   No intake or output data in the 24 hours ending 10/03/22 0807    Physical examination:  General: awake alert, oriented x3  HEENT: normocephalic non traumatic, no exophthalmos   Neck: supple, No thyroid tenderness,  Pulm: good equal air entry no added sounds  CVS: S1 + S2  Abd: soft lax, no tenderness  Skin: + skin infection Lt upper back   Neuro: CN intact, sensation present bilateral , muscle power normal  Psych: normal mood, and affect    Review of Laboratory Data:  I personally reviewed the following labs:   No results for input(s): WBC, RBC, HGB, HCT, MCV, MCH, MCHC, RDW, PLT, MPV in the last 72 hours. No results for input(s): NA, K, CL, CO2, BUN, CREATININE, GLUCOSE, CALCIUM, PROT, LABALBU, BILITOT, ALKPHOS, AST, ALT in the last 72 hours.   Beta-Hydroxybutyrate   Date Value Ref Range Status   05/27/2022 4.19 (H) 0.02 - 0.27 mmol/L Final     Lab Results   Component Value Date/Time    LABA1C 11.7 10/03/2022 07:53 AM    LABA1C 12.7 05/27/2022 07:00 PM    LABA1C 11.4 04/19/2022 12:00 PM     Lab Results   Component Value Date/Time    TSH 4.470 (H) 05/30/2022 05:45 AM     Lab Results   Component Value Date/Time    LABA1C 11.7 10/03/2022 07:53 AM    GLUCOSE 179 06/01/2022 03:00 AM    MALBCR <30 mg/g 04/19/2022 09:49 AM     Lab Results   Component Value Date/Time    TRIG 392 04/19/2022 12:00 PM    HDL 47 04/19/2022 12:00 PM    LDLCALC 162 04/19/2022 12:00 PM    CHOL 287 04/19/2022 12:00 PM       Blood culture   Lab Results   Component Value Date/Time    BC 5 Days no growth 05/27/2022 01:50 PM       Radiology:  No orders to display     ASSESSMENT & PLAN   Cherrie Zaidi, a 41 y.o.-old male seen today for inpatient diabetes management     Diabetes Mellitus type 2   Patient diabetes not well controlled. Hemoglobin A1c 11.7%  Plan: Increase Levemir to 60 units every morning  Increase Humalog to 20 units 3 times daily with meals plus high-dose insulin sliding scale  Continue Farxiga 10 mg daily  Start Mounjara 2.5 mg once weekly for 4 weeks then increase to 5 mg once weekly thereafter  No contraindications. Counseled on side effects  Will send prescription for Dexcom  Advised patient to check blood sugars 4 times per day  Advised patient to send download in 2 weeks for review  No recent hypoglycemia  I encouraged diet and exercise    Vitamin D deficiency  Patient at risk for vitamin D deficiency  Will assess vitamin D    Hyperlipidemia  Continue Crestor 20 mg once per day  Repeat lipids    Obesity  Counseled on diet and exercise      The above issues were reviewed with the patient who understood and agreed with the plan. Thank you for allowing us to participate in the care of this patient. Please do not hesitate to contact us with any additional questions.    Neil Lewis, [address] [facility] and Endocrinology   [address] [address]   Phone: [phone]  Fax: [phone]  --------------------------------------------  An electronic signature was used to authenticate this note.  FRANCISCO Valente   on 10/3/2022 at 8:07 AM

## 2022-10-04 RX ORDER — ERGOCALCIFEROL (VITAMIN D2) 1250 MCG
50000 CAPSULE ORAL WEEKLY
Qty: 12 CAPSULE | Refills: 0 | Status: SHIPPED | OUTPATIENT
Start: 2022-10-04

## 2022-10-05 ENCOUNTER — TELEPHONE (OUTPATIENT)
Dept: ENDOCRINOLOGY | Age: 47
End: 2022-10-05

## 2022-10-05 NOTE — TELEPHONE ENCOUNTER
Called, spoke to patient and gave results and recommendations/instructions  Pt verbalized understanding

## 2022-10-05 NOTE — TELEPHONE ENCOUNTER
----- Message from FRANCISCO Langley sent at 10/4/2022  8:00 AM EDT -----  Please call patient and inform him thyroid function is normal.  Triglycerides are elevated. I advise optimal blood glucose control. Continue Crestor  Patient's vitamin D is low. Please have patient start Drisdol 50,000 IU 1 tablet once weekly for 12 weeks.   Prescription sent patient can then take vitamin D 2000 IU daily over-the-counter thereafter

## 2022-10-20 ENCOUNTER — TELEPHONE (OUTPATIENT)
Dept: FAMILY MEDICINE CLINIC | Age: 47
End: 2022-10-20

## 2022-10-20 NOTE — TELEPHONE ENCOUNTER
Symptoms: very congested, runny nose and a bad cough. Pt explains \"just a typical head cold\"  Symptoms started on Tuesday, pt tested positive last night.

## 2022-10-20 NOTE — TELEPHONE ENCOUNTER
Patient advised on Mucinex OTC and notified that paxlovid would interact with Eliquis and  does not suggest stopping that.  He verbalized understanding

## 2022-10-20 NOTE — TELEPHONE ENCOUNTER
Suggest otc Mucinex DM and push fluids. Paxlovid will interact with his Eliquis and I do not suggest stopping it at this time.

## 2022-10-20 NOTE — TELEPHONE ENCOUNTER
Pt called explaining that he tested positive for covid and that he is a diabetic. He would like to know if he should be doing something to help prevent his symptoms from worsening. Please advise.

## 2022-12-13 RX ORDER — ESCITALOPRAM OXALATE 10 MG/1
TABLET ORAL
Qty: 90 TABLET | Refills: 1 | Status: SHIPPED | OUTPATIENT
Start: 2022-12-13

## 2022-12-23 RX ORDER — ERGOCALCIFEROL 1.25 MG/1
CAPSULE ORAL
Qty: 12 CAPSULE | Refills: 0 | OUTPATIENT
Start: 2022-12-23

## 2023-01-18 ENCOUNTER — OFFICE VISIT (OUTPATIENT)
Dept: FAMILY MEDICINE CLINIC | Age: 48
End: 2023-01-18
Payer: COMMERCIAL

## 2023-01-18 VITALS
TEMPERATURE: 98.3 F | OXYGEN SATURATION: 96 % | BODY MASS INDEX: 36.08 KG/M2 | DIASTOLIC BLOOD PRESSURE: 80 MMHG | HEART RATE: 110 BPM | RESPIRATION RATE: 16 BRPM | WEIGHT: 252 LBS | HEIGHT: 70 IN | SYSTOLIC BLOOD PRESSURE: 132 MMHG

## 2023-01-18 DIAGNOSIS — E78.5 HYPERLIPIDEMIA, UNSPECIFIED HYPERLIPIDEMIA TYPE: ICD-10-CM

## 2023-01-18 DIAGNOSIS — I10 ESSENTIAL HYPERTENSION: ICD-10-CM

## 2023-01-18 DIAGNOSIS — I10 PRIMARY HYPERTENSION: ICD-10-CM

## 2023-01-18 DIAGNOSIS — E11.65 POORLY CONTROLLED TYPE 2 DIABETES MELLITUS (HCC): Primary | ICD-10-CM

## 2023-01-18 DIAGNOSIS — Z12.5 SCREENING FOR PROSTATE CANCER: ICD-10-CM

## 2023-01-18 LAB — HBA1C MFR BLD: 12.1 %

## 2023-01-18 PROCEDURE — 2022F DILAT RTA XM EVC RTNOPTHY: CPT | Performed by: FAMILY MEDICINE

## 2023-01-18 PROCEDURE — 83036 HEMOGLOBIN GLYCOSYLATED A1C: CPT | Performed by: FAMILY MEDICINE

## 2023-01-18 PROCEDURE — 3075F SYST BP GE 130 - 139MM HG: CPT | Performed by: FAMILY MEDICINE

## 2023-01-18 PROCEDURE — 1036F TOBACCO NON-USER: CPT | Performed by: FAMILY MEDICINE

## 2023-01-18 PROCEDURE — G8427 DOCREV CUR MEDS BY ELIG CLIN: HCPCS | Performed by: FAMILY MEDICINE

## 2023-01-18 PROCEDURE — G8484 FLU IMMUNIZE NO ADMIN: HCPCS | Performed by: FAMILY MEDICINE

## 2023-01-18 PROCEDURE — G8417 CALC BMI ABV UP PARAM F/U: HCPCS | Performed by: FAMILY MEDICINE

## 2023-01-18 PROCEDURE — 3079F DIAST BP 80-89 MM HG: CPT | Performed by: FAMILY MEDICINE

## 2023-01-18 PROCEDURE — 99214 OFFICE O/P EST MOD 30 MIN: CPT | Performed by: FAMILY MEDICINE

## 2023-01-18 PROCEDURE — 3046F HEMOGLOBIN A1C LEVEL >9.0%: CPT | Performed by: FAMILY MEDICINE

## 2023-01-18 ASSESSMENT — PATIENT HEALTH QUESTIONNAIRE - PHQ9
SUM OF ALL RESPONSES TO PHQ QUESTIONS 1-9: 0
1. LITTLE INTEREST OR PLEASURE IN DOING THINGS: 0
2. FEELING DOWN, DEPRESSED OR HOPELESS: 0
SUM OF ALL RESPONSES TO PHQ QUESTIONS 1-9: 0
SUM OF ALL RESPONSES TO PHQ9 QUESTIONS 1 & 2: 0

## 2023-01-18 NOTE — PROGRESS NOTES
DM2:   Patient is here to fu regarding DM2. Patient is not controlled. Taking all medications and tolerating well. Patient does not smoke. Most recent labs reviewed with patient. Patient does have complaints or concerns today. He wants to review any preventative tests that need done. Lab Results   Component Value Date    LABA1C 12.1 01/18/2023       Lab Results   Component Value Date    LDLCALC - (AA) 10/03/2022        Patient's past medical, surgical, social and/or family history reviewed, updated in chart, and are non-contributory (unless otherwise stated). Medications and allergies also reviewed and updated in chart.       Review of Systems:  Constitutional:  No fever, no fatigue, no chills, no headaches, no weight change  Dermatology:  No rash, no mole, no dry or sensitive skin  ENT:  No cough, no sore throat, no sinus pain, no runny nose, no ear pain  Cardiology:  No chest pain, no palpitations, no leg edema, no shortness of breath, no PND  Gastroenterology:  No dysphagia, no abdominal pain, no nausea, no vomiting, no constipation, no diarrhea, no heartburn  Musculoskeletal:  No joint pain, no leg cramps, no back pain, no muscle aches  Respiratory:  No shortness of breath, no orthopnea, no wheezing, no GARCIA, no hemoptysis  Urology:  No blood in the urine, no urinary frequency, no urinary incontinence, no urinary urgency, no nocturia, no dysuria  Vitals:    01/18/23 1449   BP: 132/80   Pulse: (!) 110   Resp: 16   Temp: 98.3 °F (36.8 °C)   SpO2: 96%   Weight: 252 lb (114.3 kg)   Height: 5' 10\" (1.778 m)       General:  Patient alert and oriented x 3, NAD, pleasant  HEENT:  Atraumatic, normocephalic, PERRLA, EOMI, clear conjunctiva, TMs clear, nose-clear, throat - no erythema  Neck:  Supple, no goiter, no carotid bruits, no lymphadenopathy  Lungs:  CTA B  Heart:  RRR, no murmurs, gallops or rubs  Abdomen:  Soft/nt/nd, + bowel sounds  Extremities:  No clubbing, cyanosis or edema  Skin: unremarkable    Assessment/Plan:      Citlaly García was seen today for diabetes. Diagnoses and all orders for this visit:    Poorly controlled type 2 diabetes mellitus ([facility] [address]  -     POCT glycosylated hemoglobin (Hb A1C)  -     Comprehensive Metabolic Panel; Future  -     CBC; Future  -     Lipid Panel; Future  -     Hemoglobin A1C; Future    Screening for prostate cancer  -     PSA Screening; Future    Essential hypertension    Hyperlipidemia, unspecified hyperlipidemia type    Primary hypertension    As above. Call or go to ED immediately if symptoms worsen or persist.  No follow-ups on file. , or sooner if necessary. Educational materials and/or home exercises printed for patient's review and were included in patient instructions on his/her After Visit Summary and given to patient at the end of visit. Counseled regarding above diagnosis, including possible risks and complications,  especially if left uncontrolled. Counseled regarding the possible side effects, risks, benefits and alternatives to treatment; patient and/or guardian verbalizes understanding, agrees, feels comfortable with and wishes to proceed with above treatment plan. Advised patient to call with any new medication issues, and read all Rx info from pharmacy to assure aware of all possible risks and side effects of medication before taking. Reviewed age and gender appropriate health screening exams and vaccinations. Advised patient regarding importance of keeping up with recommended health maintenance and to schedule as soon as possible if overdue, as this is important in assessing for undiagnosed pathology, especially cancer, as well as protecting against potentially harmful/life threatening disease. Patient and/or guardian verbalizes understanding and agrees with above counseling, assessment and plan. All questions answered.     Chance Rajan MD  1/18/2023    I have personally reviewed and updated the chief complaint, HPI, Past Medical, Family and Social History, as well as the above Review of Systems.

## 2023-01-30 RX ORDER — ROSUVASTATIN CALCIUM 20 MG/1
TABLET, COATED ORAL
Qty: 90 TABLET | Refills: 1 | Status: SHIPPED | OUTPATIENT
Start: 2023-01-30

## 2023-04-27 ENCOUNTER — OFFICE VISIT (OUTPATIENT)
Dept: FAMILY MEDICINE CLINIC | Age: 48
End: 2023-04-27
Payer: COMMERCIAL

## 2023-04-27 ENCOUNTER — HOSPITAL ENCOUNTER (EMERGENCY)
Age: 48
Discharge: HOME | End: 2023-04-27
Attending: EMERGENCY MEDICINE
Payer: COMMERCIAL

## 2023-04-27 ENCOUNTER — APPOINTMENT (OUTPATIENT)
Dept: CT IMAGING | Age: 48
End: 2023-04-27
Payer: COMMERCIAL

## 2023-04-27 VITALS
BODY MASS INDEX: 36.22 KG/M2 | HEIGHT: 70 IN | HEART RATE: 94 BPM | TEMPERATURE: 98.5 F | WEIGHT: 253 LBS | DIASTOLIC BLOOD PRESSURE: 82 MMHG | SYSTOLIC BLOOD PRESSURE: 129 MMHG | OXYGEN SATURATION: 100 % | RESPIRATION RATE: 16 BRPM

## 2023-04-27 VITALS
DIASTOLIC BLOOD PRESSURE: 82 MMHG | SYSTOLIC BLOOD PRESSURE: 129 MMHG | BODY MASS INDEX: 36.31 KG/M2 | HEART RATE: 102 BPM | OXYGEN SATURATION: 98 % | WEIGHT: 253.6 LBS | RESPIRATION RATE: 16 BRPM | HEIGHT: 70 IN | TEMPERATURE: 98.8 F

## 2023-04-27 DIAGNOSIS — R73.9 HYPERGLYCEMIA: ICD-10-CM

## 2023-04-27 DIAGNOSIS — E11.65 POORLY CONTROLLED TYPE 2 DIABETES MELLITUS (HCC): Primary | ICD-10-CM

## 2023-04-27 DIAGNOSIS — L02.01 ABSCESS OF FACE: ICD-10-CM

## 2023-04-27 DIAGNOSIS — L03.211 FACIAL CELLULITIS: Primary | ICD-10-CM

## 2023-04-27 LAB
ALBUMIN SERPL-MCNC: 4.3 G/DL (ref 3.5–5.2)
ALP SERPL-CCNC: 91 U/L (ref 40–129)
ALT SERPL-CCNC: 13 U/L (ref 0–40)
ANION GAP SERPL CALCULATED.3IONS-SCNC: 12 MMOL/L (ref 7–16)
AST SERPL-CCNC: 11 U/L (ref 0–39)
BASOPHILS # BLD: 0.04 E9/L (ref 0–0.2)
BASOPHILS NFR BLD: 0.4 % (ref 0–2)
BILIRUB SERPL-MCNC: 0.5 MG/DL (ref 0–1.2)
BUN SERPL-MCNC: 12 MG/DL (ref 6–20)
CALCIUM SERPL-MCNC: 9.6 MG/DL (ref 8.6–10.2)
CHLORIDE SERPL-SCNC: 96 MMOL/L (ref 98–107)
CO2 SERPL-SCNC: 26 MMOL/L (ref 22–29)
CREAT SERPL-MCNC: 0.5 MG/DL (ref 0.7–1.2)
CRP SERPL HS-MCNC: 6.9 MG/DL (ref 0–0.4)
EOSINOPHIL # BLD: 0.12 E9/L (ref 0.05–0.5)
EOSINOPHIL NFR BLD: 1.1 % (ref 0–6)
ERYTHROCYTE [DISTWIDTH] IN BLOOD BY AUTOMATED COUNT: 12.6 FL (ref 11.5–15)
ERYTHROCYTE [SEDIMENTATION RATE] IN BLOOD BY WESTERGREN METHOD: 15 MM/HR (ref 0–15)
GLUCOSE SERPL-MCNC: 202 MG/DL (ref 74–99)
HBA1C MFR BLD: 13.7 %
HCT VFR BLD AUTO: 46.8 % (ref 37–54)
HGB BLD-MCNC: 15.9 G/DL (ref 12.5–16.5)
IMM GRANULOCYTES # BLD: 0.03 E9/L
IMM GRANULOCYTES NFR BLD: 0.3 % (ref 0–5)
LYMPHOCYTES # BLD: 1.47 E9/L (ref 1.5–4)
LYMPHOCYTES NFR BLD: 14.1 % (ref 20–42)
MCH RBC QN AUTO: 29.1 PG (ref 26–35)
MCHC RBC AUTO-ENTMCNC: 34 % (ref 32–34.5)
MCV RBC AUTO: 85.6 FL (ref 80–99.9)
MONOCYTES # BLD: 1.05 E9/L (ref 0.1–0.95)
MONOCYTES NFR BLD: 10 % (ref 2–12)
NEUTROPHILS # BLD: 7.74 E9/L (ref 1.8–7.3)
NEUTS SEG NFR BLD: 74.1 % (ref 43–80)
PLATELET # BLD AUTO: 294 E9/L (ref 130–450)
PMV BLD AUTO: 10.3 FL (ref 7–12)
POTASSIUM SERPL-SCNC: 3.9 MMOL/L (ref 3.5–5)
PROT SERPL-MCNC: 7.6 G/DL (ref 6.4–8.3)
RBC # BLD AUTO: 5.47 E12/L (ref 3.8–5.8)
SODIUM SERPL-SCNC: 134 MMOL/L (ref 132–146)
WBC # BLD: 10.5 E9/L (ref 4.5–11.5)

## 2023-04-27 PROCEDURE — 2580000003 HC RX 258: Performed by: STUDENT IN AN ORGANIZED HEALTH CARE EDUCATION/TRAINING PROGRAM

## 2023-04-27 PROCEDURE — 3046F HEMOGLOBIN A1C LEVEL >9.0%: CPT | Performed by: FAMILY MEDICINE

## 2023-04-27 PROCEDURE — 3079F DIAST BP 80-89 MM HG: CPT | Performed by: FAMILY MEDICINE

## 2023-04-27 PROCEDURE — 6360000004 HC RX CONTRAST MEDICATION: Performed by: RADIOLOGY

## 2023-04-27 PROCEDURE — 85025 COMPLETE CBC W/AUTO DIFF WBC: CPT

## 2023-04-27 PROCEDURE — 6370000000 HC RX 637 (ALT 250 FOR IP): Performed by: STUDENT IN AN ORGANIZED HEALTH CARE EDUCATION/TRAINING PROGRAM

## 2023-04-27 PROCEDURE — G8427 DOCREV CUR MEDS BY ELIG CLIN: HCPCS | Performed by: FAMILY MEDICINE

## 2023-04-27 PROCEDURE — G8417 CALC BMI ABV UP PARAM F/U: HCPCS | Performed by: FAMILY MEDICINE

## 2023-04-27 PROCEDURE — 1036F TOBACCO NON-USER: CPT | Performed by: FAMILY MEDICINE

## 2023-04-27 PROCEDURE — 80053 COMPREHEN METABOLIC PANEL: CPT

## 2023-04-27 PROCEDURE — 86140 C-REACTIVE PROTEIN: CPT

## 2023-04-27 PROCEDURE — 2022F DILAT RTA XM EVC RTNOPTHY: CPT | Performed by: FAMILY MEDICINE

## 2023-04-27 PROCEDURE — 87040 BLOOD CULTURE FOR BACTERIA: CPT

## 2023-04-27 PROCEDURE — 99285 EMERGENCY DEPT VISIT HI MDM: CPT

## 2023-04-27 PROCEDURE — 85651 RBC SED RATE NONAUTOMATED: CPT

## 2023-04-27 PROCEDURE — 99215 OFFICE O/P EST HI 40 MIN: CPT | Performed by: FAMILY MEDICINE

## 2023-04-27 PROCEDURE — 3074F SYST BP LT 130 MM HG: CPT | Performed by: FAMILY MEDICINE

## 2023-04-27 PROCEDURE — 83036 HEMOGLOBIN GLYCOSYLATED A1C: CPT | Performed by: FAMILY MEDICINE

## 2023-04-27 PROCEDURE — 70487 CT MAXILLOFACIAL W/DYE: CPT

## 2023-04-27 RX ORDER — CEPHALEXIN 500 MG/1
500 CAPSULE ORAL ONCE
Status: COMPLETED | OUTPATIENT
Start: 2023-04-27 | End: 2023-04-27

## 2023-04-27 RX ORDER — CEPHALEXIN 500 MG/1
500 CAPSULE ORAL 4 TIMES DAILY
Qty: 39 CAPSULE | Refills: 0 | Status: SHIPPED | OUTPATIENT
Start: 2023-04-27 | End: 2023-05-07

## 2023-04-27 RX ORDER — 0.9 % SODIUM CHLORIDE 0.9 %
1000 INTRAVENOUS SOLUTION INTRAVENOUS ONCE
Status: COMPLETED | OUTPATIENT
Start: 2023-04-27 | End: 2023-04-27

## 2023-04-27 RX ADMIN — SODIUM CHLORIDE 1000 ML: 9 INJECTION, SOLUTION INTRAVENOUS at 11:59

## 2023-04-27 RX ADMIN — CEPHALEXIN 500 MG: 500 CAPSULE ORAL at 15:24

## 2023-04-27 RX ADMIN — IOPAMIDOL 75 ML: 755 INJECTION, SOLUTION INTRAVENOUS at 13:19

## 2023-04-27 SDOH — ECONOMIC STABILITY: HOUSING INSECURITY
IN THE LAST 12 MONTHS, WAS THERE A TIME WHEN YOU DID NOT HAVE A STEADY PLACE TO SLEEP OR SLEPT IN A SHELTER (INCLUDING NOW)?: NO

## 2023-04-27 SDOH — ECONOMIC STABILITY: FOOD INSECURITY: WITHIN THE PAST 12 MONTHS, THE FOOD YOU BOUGHT JUST DIDN'T LAST AND YOU DIDN'T HAVE MONEY TO GET MORE.: NEVER TRUE

## 2023-04-27 SDOH — ECONOMIC STABILITY: INCOME INSECURITY: HOW HARD IS IT FOR YOU TO PAY FOR THE VERY BASICS LIKE FOOD, HOUSING, MEDICAL CARE, AND HEATING?: NOT HARD AT ALL

## 2023-04-27 SDOH — ECONOMIC STABILITY: FOOD INSECURITY: WITHIN THE PAST 12 MONTHS, YOU WORRIED THAT YOUR FOOD WOULD RUN OUT BEFORE YOU GOT MONEY TO BUY MORE.: NEVER TRUE

## 2023-04-27 ASSESSMENT — PAIN - FUNCTIONAL ASSESSMENT
PAIN_FUNCTIONAL_ASSESSMENT: 0-10
PAIN_FUNCTIONAL_ASSESSMENT: 0-10

## 2023-04-27 ASSESSMENT — LIFESTYLE VARIABLES
HOW OFTEN DO YOU HAVE A DRINK CONTAINING ALCOHOL: 2-4 TIMES A MONTH
HOW MANY STANDARD DRINKS CONTAINING ALCOHOL DO YOU HAVE ON A TYPICAL DAY: 5 OR 6

## 2023-04-27 ASSESSMENT — PAIN DESCRIPTION - LOCATION: LOCATION: FACE

## 2023-04-27 ASSESSMENT — PAIN SCALES - GENERAL
PAINLEVEL_OUTOF10: 5
PAINLEVEL_OUTOF10: 5

## 2023-04-27 ASSESSMENT — PAIN DESCRIPTION - ORIENTATION: ORIENTATION: RIGHT

## 2023-04-27 NOTE — ED PROVIDER NOTES
[address] [facility] ENCOUNTER        Pt Name: Gricel Piper  MRN: [id number]  [place] 1975  Date of evaluation: 4/27/2023  Provider: Janessa Branham DO  PCP: Park Easley MD  Note Started: 3:33 PM EDT 4/27/23    CHIEF COMPLAINT       Chief Complaint   Patient presents with    Abscess     Rt side of face just in front of rt ear. HISTORY OF PRESENT ILLNESS: 1 or more Elements   History From: Patient    Limitations to history : None    Gricel Piper is a 41 y.o. male with past medical history significant for uncontrolled diabetes type 2, hypertension, paroxysmal atrial fibrillation, multiple episodes of skin and soft tissue infections including cellulitis, many abscesses, and necrotizing soft tissue infection who presents to the ED from his PCP office with complaints of right-sided facial swelling and abscess. Patient was seen by his PCP today for the same. Patient states that symptoms began on Tuesday starting with a small red anoop on his face and gradually increasing redness and swelling spreading up the side of his cheek in front of his ear. PCP was concerned given patient's history of extensive skin and soft tissue infections in the rapid spread. PCP wanted patient sent to the hospital for further evaluation and to see if he would require IV antibiotics at this time. Patient denies any difficulty hearing, difficulty breathing, difficulty swallowing, difficulty with vision. Patient denies any fevers, fatigue, shortness of breath, chest pain, nausea, vomiting, diarrhea. He denies any injury to his face. He denies any dental caries or problems on his right side of his face. However patient does admit to recent concern for dental abscess on the left side of his mouth treated 3 weeks ago by his dentist with amoxicillin x1 week with subsequent resolution of symptoms.   Other than amoxicillin, patient has not

## 2023-04-28 LAB
BACTERIA BLD CULT: NORMAL
BACTERIA BLD CULT: NORMAL

## 2023-05-02 LAB
BACTERIA BLD CULT: NORMAL
BACTERIA BLD CULT: NORMAL

## 2023-05-23 ENCOUNTER — OFFICE VISIT (OUTPATIENT)
Dept: FAMILY MEDICINE CLINIC | Age: 48
End: 2023-05-23
Payer: COMMERCIAL

## 2023-05-23 VITALS
HEART RATE: 90 BPM | WEIGHT: 264.4 LBS | TEMPERATURE: 97.8 F | SYSTOLIC BLOOD PRESSURE: 134 MMHG | RESPIRATION RATE: 16 BRPM | OXYGEN SATURATION: 98 % | HEIGHT: 70 IN | DIASTOLIC BLOOD PRESSURE: 86 MMHG | BODY MASS INDEX: 37.85 KG/M2

## 2023-05-23 DIAGNOSIS — E78.5 HYPERLIPIDEMIA, UNSPECIFIED HYPERLIPIDEMIA TYPE: ICD-10-CM

## 2023-05-23 DIAGNOSIS — E11.65 POORLY CONTROLLED TYPE 2 DIABETES MELLITUS (HCC): Primary | ICD-10-CM

## 2023-05-23 DIAGNOSIS — I10 ESSENTIAL HYPERTENSION: ICD-10-CM

## 2023-05-23 PROCEDURE — G8417 CALC BMI ABV UP PARAM F/U: HCPCS | Performed by: FAMILY MEDICINE

## 2023-05-23 PROCEDURE — 99214 OFFICE O/P EST MOD 30 MIN: CPT | Performed by: FAMILY MEDICINE

## 2023-05-23 PROCEDURE — 3046F HEMOGLOBIN A1C LEVEL >9.0%: CPT | Performed by: FAMILY MEDICINE

## 2023-05-23 PROCEDURE — 3078F DIAST BP <80 MM HG: CPT | Performed by: FAMILY MEDICINE

## 2023-05-23 PROCEDURE — 3074F SYST BP LT 130 MM HG: CPT | Performed by: FAMILY MEDICINE

## 2023-05-23 PROCEDURE — G8427 DOCREV CUR MEDS BY ELIG CLIN: HCPCS | Performed by: FAMILY MEDICINE

## 2023-05-23 PROCEDURE — 2022F DILAT RTA XM EVC RTNOPTHY: CPT | Performed by: FAMILY MEDICINE

## 2023-05-23 PROCEDURE — 1036F TOBACCO NON-USER: CPT | Performed by: FAMILY MEDICINE

## 2023-05-23 RX ORDER — DOXYCYCLINE HYCLATE 100 MG/1
100 CAPSULE ORAL 2 TIMES DAILY
Qty: 20 CAPSULE | Refills: 0 | Status: SHIPPED | OUTPATIENT
Start: 2023-05-23 | End: 2023-06-02

## 2023-05-23 NOTE — PROGRESS NOTES
edema  Skin: unremarkable    Assessment/Plan:      Real Ybarra was seen today for hypertension. Diagnoses and all orders for this visit:    Poorly controlled type 2 diabetes mellitus ([facility] [address]  -     Comprehensive Metabolic Panel; Future  -     CBC; Future  -     Lipid Panel; Future  -     Hemoglobin A1C; Future    Essential hypertension    Hyperlipidemia, unspecified hyperlipidemia type    Other orders  -     Tirzepatide (MOUNJARO) 7.5 MG/0.5ML SOPN SC injection; Inject 0.5 mLs into the skin once a week  -     doxycycline hyclate (VIBRAMYCIN) 100 MG capsule; Take 1 capsule by mouth 2 times daily for 10 days      As above. Call or go to ED immediately if symptoms worsen or persist.  No follow-ups on file. , or sooner if necessary. Educational materials and/or home exercises printed for patient's review and were included in patient instructions on his/her After Visit Summary and given to patient at the end of visit. Counseled regarding above diagnosis, including possible risks and complications,  especially if left uncontrolled. Counseled regarding the possible side effects, risks, benefits and alternatives to treatment; patient and/or guardian verbalizes understanding, agrees, feels comfortable with and wishes to proceed with above treatment plan. Advised patient to call with any new medication issues, and read all Rx info from pharmacy to assure aware of all possible risks and side effects of medication before taking. Reviewed age and gender appropriate health screening exams and vaccinations. Advised patient regarding importance of keeping up with recommended health maintenance and to schedule as soon as possible if overdue, as this is important in assessing for undiagnosed pathology, especially cancer, as well as protecting against potentially harmful/life threatening disease. Patient and/or guardian verbalizes understanding and agrees with above counseling, assessment and plan.     All

## 2023-06-08 RX ORDER — LISINOPRIL 20 MG/1
20 TABLET ORAL DAILY
Qty: 90 TABLET | Refills: 3 | Status: SHIPPED | OUTPATIENT
Start: 2023-06-08

## 2023-06-08 RX ORDER — ESCITALOPRAM OXALATE 10 MG/1
10 TABLET ORAL DAILY
Qty: 90 TABLET | Refills: 3 | Status: SHIPPED | OUTPATIENT
Start: 2023-06-08

## 2023-06-08 RX ORDER — ROSUVASTATIN CALCIUM 20 MG/1
TABLET, COATED ORAL
Qty: 90 TABLET | Refills: 3 | Status: SHIPPED | OUTPATIENT
Start: 2023-06-08

## 2023-06-08 RX ORDER — ALLOPURINOL 300 MG/1
300 TABLET ORAL DAILY
Qty: 90 TABLET | Refills: 3 | Status: SHIPPED | OUTPATIENT
Start: 2023-06-08

## 2023-08-18 RX ORDER — EMPAGLIFLOZIN 25 MG/1
TABLET, FILM COATED ORAL
Qty: 90 TABLET | Refills: 3 | Status: SHIPPED | OUTPATIENT
Start: 2023-08-18

## 2024-05-13 RX ORDER — APIXABAN 5 MG/1
5 TABLET, FILM COATED ORAL 2 TIMES DAILY
Qty: 60 TABLET | Refills: 0 | Status: SHIPPED | OUTPATIENT
Start: 2024-05-13

## 2024-05-13 RX ORDER — ESCITALOPRAM OXALATE 10 MG/1
10 TABLET ORAL DAILY
Qty: 30 TABLET | Refills: 0 | Status: SHIPPED | OUTPATIENT
Start: 2024-05-13

## 2024-05-13 RX ORDER — LISINOPRIL 20 MG/1
20 TABLET ORAL DAILY
Qty: 30 TABLET | Refills: 0 | Status: SHIPPED | OUTPATIENT
Start: 2024-05-13

## 2024-05-13 RX ORDER — ROSUVASTATIN CALCIUM 20 MG/1
TABLET, COATED ORAL
Qty: 30 TABLET | Refills: 0 | Status: SHIPPED | OUTPATIENT
Start: 2024-05-13

## 2024-05-13 RX ORDER — ALLOPURINOL 300 MG/1
300 TABLET ORAL DAILY
Qty: 30 TABLET | Refills: 0 | Status: SHIPPED | OUTPATIENT
Start: 2024-05-13

## (undated) DEVICE — DRAPE,LAPAROTOMY,PCH,STERILE: Brand: MEDLINE

## (undated) DEVICE — GLOVE SURG SZ 75 L12IN FNGR THK94MIL TRNSLUC YEL LTX

## (undated) DEVICE — Device

## (undated) DEVICE — NEEDLE HYPO 25GA L1.5IN BLU POLYPR HUB S STL REG BVL STR

## (undated) DEVICE — PAD,ABDOMINAL,5"X9",ST,LF,25/BX: Brand: MEDLINE INDUSTRIES, INC.

## (undated) DEVICE — SOLUTION IV IRRIG POUR BRL 0.9% SODIUM CHL 2F7124

## (undated) DEVICE — 4-PORT MANIFOLD: Brand: NEPTUNE 2

## (undated) DEVICE — ELECTRODE PT RET AD L9FT HI MOIST COND ADH HYDRGEL CORDED

## (undated) DEVICE — CONTROL SYRINGE LUER-LOCK TIP: Brand: MONOJECT

## (undated) DEVICE — GOWN,SIRUS,FABRNF,XL,20/CS: Brand: MEDLINE

## (undated) DEVICE — SPECIMEN TRAP

## (undated) DEVICE — TOWEL,OR,DSP,ST,BLUE,STD,6/PK,12PK/CS: Brand: MEDLINE

## (undated) DEVICE — CHLORAPREP 26ML ORANGE

## (undated) DEVICE — PACK PROCEDURE SURG GEN CUST

## (undated) DEVICE — DOUBLE BASIN SET: Brand: MEDLINE INDUSTRIES, INC.

## (undated) DEVICE — GAUZE,SPONGE,4"X4",8PLY,STRL,LF,10/TRAY: Brand: MEDLINE

## (undated) DEVICE — TUBING SUCT 12FR MAL ALUM SHFT FN CAP VENT UNIV CONN W/ OBT